# Patient Record
Sex: FEMALE | Race: BLACK OR AFRICAN AMERICAN | NOT HISPANIC OR LATINO | URBAN - METROPOLITAN AREA
[De-identification: names, ages, dates, MRNs, and addresses within clinical notes are randomized per-mention and may not be internally consistent; named-entity substitution may affect disease eponyms.]

---

## 2018-04-23 ENCOUNTER — INPATIENT (INPATIENT)
Facility: HOSPITAL | Age: 63
LOS: 31 days | Discharge: OTHER INSTITUTION | DRG: 4 | End: 2018-05-25
Attending: HOSPITALIST | Admitting: INTERNAL MEDICINE
Payer: COMMERCIAL

## 2018-04-23 VITALS — WEIGHT: 175.71 LBS

## 2018-04-23 DIAGNOSIS — I61.9 NONTRAUMATIC INTRACEREBRAL HEMORRHAGE, UNSPECIFIED: ICD-10-CM

## 2018-04-23 DIAGNOSIS — F10.10 ALCOHOL ABUSE, UNCOMPLICATED: ICD-10-CM

## 2018-04-23 DIAGNOSIS — J96.01 ACUTE RESPIRATORY FAILURE WITH HYPOXIA: ICD-10-CM

## 2018-04-23 DIAGNOSIS — I61.3 NONTRAUMATIC INTRACEREBRAL HEMORRHAGE IN BRAIN STEM: ICD-10-CM

## 2018-04-23 LAB
ALBUMIN SERPL ELPH-MCNC: 4.5 G/DL — SIGNIFICANT CHANGE UP (ref 3.3–5.2)
ALP SERPL-CCNC: 81 U/L — SIGNIFICANT CHANGE UP (ref 40–120)
ALT FLD-CCNC: <5 U/L — SIGNIFICANT CHANGE UP
ANION GAP SERPL CALC-SCNC: 15 MMOL/L — SIGNIFICANT CHANGE UP (ref 5–17)
APPEARANCE UR: CLEAR — SIGNIFICANT CHANGE UP
APTT BLD: 25.2 SEC — LOW (ref 27.5–37.4)
AST SERPL-CCNC: <5 U/L — SIGNIFICANT CHANGE UP
BASOPHILS # BLD AUTO: 0.1 K/UL — SIGNIFICANT CHANGE UP (ref 0–0.2)
BASOPHILS NFR BLD AUTO: 0.6 % — SIGNIFICANT CHANGE UP (ref 0–2)
BILIRUB SERPL-MCNC: 0.6 MG/DL — SIGNIFICANT CHANGE UP (ref 0.4–2)
BILIRUB UR-MCNC: NEGATIVE — SIGNIFICANT CHANGE UP
BUN SERPL-MCNC: 10 MG/DL — SIGNIFICANT CHANGE UP (ref 8–20)
CALCIUM SERPL-MCNC: 9.5 MG/DL — SIGNIFICANT CHANGE UP (ref 8.6–10.2)
CHLORIDE SERPL-SCNC: 99 MMOL/L — SIGNIFICANT CHANGE UP (ref 98–107)
CHOLEST SERPL-MCNC: 229 MG/DL — HIGH (ref 110–199)
CK MB CFR SERPL CALC: 3.1 NG/ML — SIGNIFICANT CHANGE UP (ref 0–6.7)
CK SERPL-CCNC: 177 U/L — HIGH (ref 25–170)
CO2 SERPL-SCNC: 24 MMOL/L — SIGNIFICANT CHANGE UP (ref 22–29)
COLOR SPEC: YELLOW — SIGNIFICANT CHANGE UP
CREAT SERPL-MCNC: 0.57 MG/DL — SIGNIFICANT CHANGE UP (ref 0.5–1.3)
DIFF PNL FLD: ABNORMAL
EOSINOPHIL # BLD AUTO: 0.1 K/UL — SIGNIFICANT CHANGE UP (ref 0–0.5)
EOSINOPHIL NFR BLD AUTO: 1.2 % — SIGNIFICANT CHANGE UP (ref 0–6)
EPI CELLS # UR: SIGNIFICANT CHANGE UP
ETHANOL SERPL-MCNC: <10 MG/DL — SIGNIFICANT CHANGE UP
GLUCOSE SERPL-MCNC: 147 MG/DL — HIGH (ref 70–115)
GLUCOSE UR QL: NEGATIVE MG/DL — SIGNIFICANT CHANGE UP
HCT VFR BLD CALC: 42.8 % — SIGNIFICANT CHANGE UP (ref 37–47)
HDLC SERPL-MCNC: 83 MG/DL — SIGNIFICANT CHANGE UP
HGB BLD-MCNC: 14.2 G/DL — SIGNIFICANT CHANGE UP (ref 12–16)
INR BLD: 0.96 RATIO — SIGNIFICANT CHANGE UP (ref 0.88–1.16)
KETONES UR-MCNC: ABNORMAL
LEUKOCYTE ESTERASE UR-ACNC: NEGATIVE — SIGNIFICANT CHANGE UP
LIPID PNL WITH DIRECT LDL SERPL: 130 MG/DL — SIGNIFICANT CHANGE UP
LYMPHOCYTES # BLD AUTO: 29.3 % — SIGNIFICANT CHANGE UP (ref 20–55)
LYMPHOCYTES # BLD AUTO: 3.2 K/UL — SIGNIFICANT CHANGE UP (ref 1–4.8)
MCHC RBC-ENTMCNC: 32.2 PG — HIGH (ref 27–31)
MCHC RBC-ENTMCNC: 33.2 G/DL — SIGNIFICANT CHANGE UP (ref 32–36)
MCV RBC AUTO: 97.1 FL — SIGNIFICANT CHANGE UP (ref 81–99)
MONOCYTES # BLD AUTO: 0.7 K/UL — SIGNIFICANT CHANGE UP (ref 0–0.8)
MONOCYTES NFR BLD AUTO: 6.1 % — SIGNIFICANT CHANGE UP (ref 3–10)
NEUTROPHILS # BLD AUTO: 6.8 K/UL — SIGNIFICANT CHANGE UP (ref 1.8–8)
NEUTROPHILS NFR BLD AUTO: 62.5 % — SIGNIFICANT CHANGE UP (ref 37–73)
NITRITE UR-MCNC: NEGATIVE — SIGNIFICANT CHANGE UP
PH UR: 7 — SIGNIFICANT CHANGE UP (ref 5–8)
PLATELET # BLD AUTO: 216 K/UL — SIGNIFICANT CHANGE UP (ref 150–400)
POTASSIUM SERPL-MCNC: 6.4 MMOL/L — CRITICAL HIGH (ref 3.5–5.3)
POTASSIUM SERPL-SCNC: 6.4 MMOL/L — CRITICAL HIGH (ref 3.5–5.3)
PROT SERPL-MCNC: 8.9 G/DL — HIGH (ref 6.6–8.7)
PROT UR-MCNC: 30 MG/DL
PROTHROM AB SERPL-ACNC: 10.5 SEC — SIGNIFICANT CHANGE UP (ref 9.8–12.7)
RBC # BLD: 4.41 M/UL — SIGNIFICANT CHANGE UP (ref 4.4–5.2)
RBC # FLD: 13.7 % — SIGNIFICANT CHANGE UP (ref 11–15.6)
RBC CASTS # UR COMP ASSIST: SIGNIFICANT CHANGE UP /HPF (ref 0–4)
SODIUM SERPL-SCNC: 138 MMOL/L — SIGNIFICANT CHANGE UP (ref 135–145)
SP GR SPEC: 1.01 — SIGNIFICANT CHANGE UP (ref 1.01–1.02)
TOTAL CHOLESTEROL/HDL RATIO MEASUREMENT: 3 RATIO — LOW (ref 3.3–7.1)
TRIGL SERPL-MCNC: 79 MG/DL — SIGNIFICANT CHANGE UP (ref 10–200)
TROPONIN T SERPL-MCNC: <0.01 NG/ML — SIGNIFICANT CHANGE UP (ref 0–0.06)
UROBILINOGEN FLD QL: NEGATIVE MG/DL — SIGNIFICANT CHANGE UP
WBC # BLD: 10.8 K/UL — SIGNIFICANT CHANGE UP (ref 4.8–10.8)
WBC # FLD AUTO: 10.8 K/UL — SIGNIFICANT CHANGE UP (ref 4.8–10.8)
WBC UR QL: NEGATIVE — SIGNIFICANT CHANGE UP

## 2018-04-23 PROCEDURE — 99291 CRITICAL CARE FIRST HOUR: CPT

## 2018-04-23 PROCEDURE — 70496 CT ANGIOGRAPHY HEAD: CPT | Mod: 26

## 2018-04-23 PROCEDURE — 99223 1ST HOSP IP/OBS HIGH 75: CPT

## 2018-04-23 PROCEDURE — 93010 ELECTROCARDIOGRAM REPORT: CPT

## 2018-04-23 PROCEDURE — 70450 CT HEAD/BRAIN W/O DYE: CPT | Mod: 26,77

## 2018-04-23 PROCEDURE — 71045 X-RAY EXAM CHEST 1 VIEW: CPT | Mod: 26

## 2018-04-23 PROCEDURE — 70450 CT HEAD/BRAIN W/O DYE: CPT | Mod: 26,59

## 2018-04-23 RX ORDER — NICARDIPINE HYDROCHLORIDE 30 MG/1
5 CAPSULE, EXTENDED RELEASE ORAL
Qty: 40 | Refills: 0 | Status: DISCONTINUED | OUTPATIENT
Start: 2018-04-23 | End: 2018-05-01

## 2018-04-23 RX ORDER — SUCCINYLCHOLINE CHLORIDE 100 MG/5ML
100 SYRINGE (ML) INTRAVENOUS ONCE
Qty: 0 | Refills: 0 | Status: COMPLETED | OUTPATIENT
Start: 2018-04-23 | End: 2018-04-23

## 2018-04-23 RX ORDER — FENTANYL CITRATE 50 UG/ML
100 INJECTION INTRAVENOUS ONCE
Qty: 0 | Refills: 0 | Status: DISCONTINUED | OUTPATIENT
Start: 2018-04-23 | End: 2018-04-23

## 2018-04-23 RX ORDER — PANTOPRAZOLE SODIUM 20 MG/1
40 TABLET, DELAYED RELEASE ORAL DAILY
Qty: 0 | Refills: 0 | Status: DISCONTINUED | OUTPATIENT
Start: 2018-04-23 | End: 2018-04-30

## 2018-04-23 RX ORDER — CHLORHEXIDINE GLUCONATE 213 G/1000ML
15 SOLUTION TOPICAL
Qty: 0 | Refills: 0 | Status: DISCONTINUED | OUTPATIENT
Start: 2018-04-23 | End: 2018-05-25

## 2018-04-23 RX ORDER — ETOMIDATE 2 MG/ML
20 INJECTION INTRAVENOUS ONCE
Qty: 0 | Refills: 0 | Status: COMPLETED | OUTPATIENT
Start: 2018-04-23 | End: 2018-04-23

## 2018-04-23 RX ORDER — ACETAMINOPHEN 500 MG
1000 TABLET ORAL ONCE
Qty: 0 | Refills: 0 | Status: COMPLETED | OUTPATIENT
Start: 2018-04-23 | End: 2018-04-23

## 2018-04-23 RX ORDER — PROPOFOL 10 MG/ML
35 INJECTION, EMULSION INTRAVENOUS
Qty: 1000 | Refills: 0 | Status: DISCONTINUED | OUTPATIENT
Start: 2018-04-23 | End: 2018-04-23

## 2018-04-23 RX ORDER — NICARDIPINE HYDROCHLORIDE 30 MG/1
5 CAPSULE, EXTENDED RELEASE ORAL
Qty: 40 | Refills: 0 | Status: DISCONTINUED | OUTPATIENT
Start: 2018-04-23 | End: 2018-04-23

## 2018-04-23 RX ADMIN — FENTANYL CITRATE 100 MICROGRAM(S): 50 INJECTION INTRAVENOUS at 11:01

## 2018-04-23 RX ADMIN — NICARDIPINE HYDROCHLORIDE 25 MG/HR: 30 CAPSULE, EXTENDED RELEASE ORAL at 10:30

## 2018-04-23 RX ADMIN — FENTANYL CITRATE 100 MICROGRAM(S): 50 INJECTION INTRAVENOUS at 10:35

## 2018-04-23 RX ADMIN — FENTANYL CITRATE 100 MICROGRAM(S): 50 INJECTION INTRAVENOUS at 10:56

## 2018-04-23 RX ADMIN — NICARDIPINE HYDROCHLORIDE 25 MG/HR: 30 CAPSULE, EXTENDED RELEASE ORAL at 12:24

## 2018-04-23 RX ADMIN — PROPOFOL 16.74 MICROGRAM(S)/KG/MIN: 10 INJECTION, EMULSION INTRAVENOUS at 10:40

## 2018-04-23 RX ADMIN — NICARDIPINE HYDROCHLORIDE 25 MG/HR: 30 CAPSULE, EXTENDED RELEASE ORAL at 18:48

## 2018-04-23 RX ADMIN — ETOMIDATE 20 MILLIGRAM(S): 2 INJECTION INTRAVENOUS at 09:38

## 2018-04-23 RX ADMIN — FENTANYL CITRATE 100 MICROGRAM(S): 50 INJECTION INTRAVENOUS at 09:53

## 2018-04-23 RX ADMIN — CHLORHEXIDINE GLUCONATE 15 MILLILITER(S): 213 SOLUTION TOPICAL at 18:48

## 2018-04-23 RX ADMIN — PANTOPRAZOLE SODIUM 40 MILLIGRAM(S): 20 TABLET, DELAYED RELEASE ORAL at 12:23

## 2018-04-23 RX ADMIN — PROPOFOL 16.74 MICROGRAM(S)/KG/MIN: 10 INJECTION, EMULSION INTRAVENOUS at 12:24

## 2018-04-23 RX ADMIN — Medication 100 MILLIGRAM(S): at 09:38

## 2018-04-23 RX ADMIN — Medication 400 MILLIGRAM(S): at 20:45

## 2018-04-23 NOTE — ED ADULT NURSE REASSESSMENT NOTE - NS ED NURSE REASSESS COMMENT FT1
100mcg fentanyl given 0953.
chase  started at 0946 @ 10mg
patient @ CT. pt. brought back to room for immediate intubation by MD. Buckley. 0938 etomitate and 100 of succ given. 23 @ the lip tube size 7.5
propofol started @ 25 @ 9829.
pt. transported for CtA @ 4290
100mcg given at 1035am by SALBADOR Moscoso
ICU @ BEDSIDE TO EVALUATE PT.
cardene turned off @ 0626.
chase koenig started @ 1031 3mg/ propofol @ 50 at 1032.
neurology @ bedside to evaluate patient and to talk to family.
refer to stroke flowsheet and chart and provider notes, Neurosurgical PA and ICU at bedside, pending transfer to ICU for hemorrhagic stroke, pt on cardene and propofol gtt

## 2018-04-23 NOTE — PROGRESS NOTE ADULT - ASSESSMENT
63yf presented to the ED with new onset of dizziness and unresponsiveness.  Ct of the brain post for pontine hemorrhage.  Plan  1. exam and films reviewed with Dr Cullen-at this time the patient will need medical management.   2. Blood pressure control -Pt is on Cardene.  3. Pt will be admitted to the MICU  4. Family informed that the specific time of hemorrhage is non surgical and medical management needed.  informed that this type of bleed is devastating and prognosis is poor.

## 2018-04-23 NOTE — H&P ADULT - ASSESSMENT
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

## 2018-04-23 NOTE — H&P ADULT - HISTORY OF PRESENT ILLNESS
62F with a history of alcoholism, who presented with dizziness and then collapsed at home. Upon arrival in the ER she was obtunded and intubated for airway support. He BP was noted to be elevated. 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 They was no evidence of aneurysm or dissection noted on CTA. Her BP wascontrolled on IV Cardene. Neurologically, she was unresponsive, her pupils were small and nonreactive, there was a gag reflex, and she withdrew her legs and left hand to pain.    Neurosurgery was consulted and confirmed there was no surgical or non-invasive intervention which could be offered.

## 2018-04-23 NOTE — H&P ADULT - ATTENDING COMMENTS
I have seen and evaluated the patient agree with above with the following additions:    ICH pontine, poor neuro exam, poor exam no surgical intervention  BP control, palliative care evaluation will see if any improvement over the next 24-48 hours family updated at bedside    CCT 35 min

## 2018-04-23 NOTE — ED PROVIDER NOTE - OBJECTIVE STATEMENT
64 yo female no known PMHx but +hx of etoh abuse; BIB EMS with , patient awoke this morning normally, no complaints; was getting ready for an event this morning, when suddenly at approx 8:30-8:45 she called out to her  who found her slumped over in chair, mumbling that she "didn't feel well, " then progressed to unable to speak. EMS noted  systolic in field, FS was normal; patient unable to speak on arrival, hx otherwise limited.

## 2018-04-23 NOTE — ED ADULT NURSE NOTE - OBJECTIVE STATEMENT
63 year old female comes to ED. CODE STROKE called. last known well at 0830.pt. has no known history besides hx of alcohol abuse. 63 year old female comes to ED brought in by EMS. CODE STROKE called. last known well at 0830.  pt. code team called to bedside. pt. not able to talk or follow commands. pt. has no known history besides hx of alcohol abuse. patient brought to CT. patient having snoring respirations, unable to protect her airway.

## 2018-04-23 NOTE — H&P ADULT - PROBLEM SELECTOR PLAN 1
Likely related to uncontrolled HTN  WIll continue BP control with Cardene  Q1H Neuro checks  Minimize sedatives as much as possible to comlpete true neuro exam.  Repeat CT scan in 8 hours  F/U with Neuro-surgery  Will need to address goals of care with the family. Initially discussed with the patient's , who is not ready at this point to make a definitive decisions. Will need Palliative care evaluation to assist with goals of care discussions.

## 2018-04-23 NOTE — ED ADULT TRIAGE NOTE - CHIEF COMPLAINT QUOTE
Patient BIBA, last known well this morning at 8:30, patient presents with right sided facial droop, right sided weakness and slurred speech,  Dr Buckley at bedside at 9:24 and code stroke called.  Son with patient for further information

## 2018-04-23 NOTE — ED PROVIDER NOTE - MEDICAL DECISION MAKING DETAILS
shortly after non con CT, became more sonorous, snoring respirations, no meaningful response; intubated for airway protection; neurosrugery and micu notified; will admit

## 2018-04-23 NOTE — H&P ADULT - PROBLEM SELECTOR PLAN 3
Related to ICH  Continue with Vent support.   Wean FIO2 for SAO2 > 92%  6cc/Kg TV for vent protective strategy  F/U ABG  VAP bundle  Minimize sedation as much as possible to help with true neuro exam

## 2018-04-23 NOTE — ED PROVIDER NOTE - GASTROINTESTINAL, MLM
Abdomen soft, non-tender, no guarding. Abdomen soft, non-tender, no guarding. +palpable 20 week fibroid uterus

## 2018-04-23 NOTE — PROGRESS NOTE ADULT - SUBJECTIVE AND OBJECTIVE BOX
HPI:    PAST MEDICAL & SURGICAL HISTORY:      REVIEW OF SYSTEMS:    CONSTITUTIONAL: No fever, weight loss, or fatigue  EYES: No eye pain, visual disturbances, or discharge  ENMT:  No difficulty hearing, tinnitus, vertigo; No sinus or throat pain  NECK: No pain or stiffness  BREASTS: No pain, masses, or nipple discharge  RESPIRATORY: No cough, wheezing, chills or hemoptysis; No shortness of breath  CARDIOVASCULAR: No chest pain, palpitations, dizziness, or leg swelling  GASTROINTESTINAL: No abdominal or epigastric pain. No nausea, vomiting, or hematemesis; No diarrhea or constipation. No melena or hematochezia.  GENITOURINARY: No dysuria, frequency, hematuria, or incontinence  NEUROLOGICAL: No headaches, memory loss, loss of strength, numbness, or tremors  SKIN: No itching, burning, rashes, or lesions   LYMPH NODES: No enlarged glands  ENDOCRINE: No heat or cold intolerance; No hair loss  MUSCULOSKELETAL: No joint pain or swelling; No muscle, back, or extremity pain  PSYCHIATRIC: No depression, anxiety, mood swings, or difficulty sleeping  HEME/LYMPH: No easy bruising, or bleeding gums  ALLERY AND IMMUNOLOGIC: No hives or eczema    Allergies    No Known Allergies    Intolerances      MEDICATIONS  (STANDING):    MEDICATIONS  (PRN):    SOCIAL HISTORY:  FAMILY HISTORY:    Vital Signs Last 24 Hrs  T(C): --  T(F): --  HR: 65 (23 Apr 2018 09:43) (65 - 65)  BP: 260/140 (23 Apr 2018 09:44) (260/140 - 260/140)  BP(mean): --  RR: 28 (23 Apr 2018 09:43) (28 - 28)  SpO2: 100% (23 Apr 2018 09:43) (100% - 100%)    PHYSICAL EXAM:    GENERAL: NAD, well-groomed, well-developed  HEAD:  Atraumatic, Normocephalic  EYES: EOMI, PERRLA, conjunctiva and sclera clear  ENMT: No tonsillar erythema, exudates, or enlargement; Moist mucous membranes, Good dentition, No lesions  NECK: Supple, No JVD, Normal thyroid  NERVOUS SYSTEM:  Alert & Oriented X3, Good concentration; Motor Strength 5/5 B/L upper and lower extremities; DTRs 2+ intact and symmetric  CHEST/LUNG: Clear to percussion bilaterally; No rales, rhonchi, wheezing, or rubs  HEART: Regular rate and rhythm; No murmurs, rubs, or gallops  ABDOMEN: Soft, Nontender, Nondistended; Bowel sounds present  EXTREMITIES:  2+ Peripheral Pulses, No clubbing, cyanosis, or edema  LYMPH: No lymphadenopathy noted  SKIN: No rashes or lesions    LABS:                RADIOLOGY & ADDITIONAL STUDIES:  ~~~~~~~~~~~~~~~~~~~~~~~~~~~~~~ HPI:  63yf prepparing to attend a  when suddenly she felt dizzy and said "I think am having a stroke." Pt does not have any prior stroke history.  Pt does not have any medical history.  Upon arrival to the ED she was unresponsive. Pt was intubated in the ED.    PAST MEDICAL & SURGICAL HISTORY:  No Medical History    REVIEW OF SYSTEMS:  Pt is unresponsive, intubated. ROS unable to be reviewed    Allergies    No Known Allergies    Intolerances      MEDICATIONS  (STANDING):    MEDICATIONS  (PRN):    SOCIAL HISTORY:  FAMILY HISTORY:    Vital Signs Last 24 Hrs  T(C): --  T(F): --  HR: 65 (2018 09:43) (65 - 65)  BP: 260/140 (2018 09:44) (260/140 - 260/140)  BP(mean): --  RR: 28 (2018 09:43) (28 - 28)  SpO2: 100% (2018 09:43) (100% - 100%)    PHYSICAL EXAM:    GENERAL: NAD, well-groomed, well-developed  HEAD:  Atraumatic, Normocephalic  EYES: . PInpoint bilat, no corneals bilat,  intermittent downward gaze.  ENMT: intubated, pos gag. no definite facial noted. Pos resp  NECK: Supple, No JVD,   NERVOUS SYSTEM: Unresp,minimum withdrawal with noxious of the lower extrem, grasp with the left hand spont,   CHEST/LUNG: Clear to few coarse bs bilaterally; No rales, rhonchi, wheezing, or rubs  HEART: Regular rate and rhythm; No murmurs, rubs, or gallops  ABDOMEN: Soft, Nontender, Nondistended; Bowel sounds present  EXTREMITIES:  2+ Peripheral Pulses, No clubbing, cyanosis, or edema    LABS:                RADIOLOGY & ADDITIONAL STUDIES:  ~~~~~~~~~~~~~~~~~~~~~~~~~~~~~~  < from: CT Angio Head w/ IV Cont (18 @ 10:08) >   EXAM:  CT ANGIO BRAIN (W)AW IC                        PROCEDURE DATE:  2018     EXAM:  CT ANGIO BRAIN (W)AW IC                        PROCEDURE DATE:  2018    < end of copied text >  < from: CT Angio Head w/ IV Cont (18 @ 10:08) >  IMPRESSION: Acute pontine hemorrhage, large in size with new dissection   into the fourth ventricle.  No hydrocephalus. Unremarkable CTA of the   brain.  < end of copied text >    < from: CT Head No Cont (18 @ 09:33) >   EXAM:  CT BRAIN                          PROCEDURE DATE:  2018    < IMPRESSION:      2.2 x 1.0 x 2.2 cm pontine hemorrhage. Findings   discussed with Dr. Buckley at 9:32 AM.  discussed with Dr. Bucklye at 9:32 AM.  < end of copied text > HPI:  63yf prepparing to attend a  when suddenly she felt dizzy and said "I think am having a stroke." Pt does not have any prior stroke history.  Pt does not have any medical history.  Upon arrival to the ED she was unresponsive. Pt was intubated in the ED.    PAST MEDICAL & SURGICAL HISTORY:  No Medical History    REVIEW OF SYSTEMS:  Pt is unresponsive, intubated. ROS unable to be reviewed    Allergies    No Known Allergies    Intolerances      MEDICATIONS  (STANDING):    MEDICATIONS  (PRN):    SOCIAL HISTORY:  FAMILY HISTORY:    Vital Signs Last 24 Hrs  T(C): --  T(F): --  HR: 65 (2018 09:43) (65 - 65)  BP: 260/140 (2018 09:44) (260/140 - 260/140)  BP(mean): --  RR: 28 (2018 09:43) (28 - 28)  SpO2: 100% (2018 09:43) (100% - 100%)    PHYSICAL EXAM:    GENERAL: NAD, well-groomed, well-developed  HEAD:  Atraumatic, Normocephalic  EYES: . PInpoint bilat, no corneals bilat,  intermittent downward gaze.  ENMT: intubated, pos gag. no definite facial noted. Pos resp  NECK: Supple, No JVD,   NERVOUS SYSTEM: Unresp,minimum withdrawal with noxious of the lower extrem, grasp with the left hand spont,   CHEST/LUNG: Clear to few coarse bs bilaterally; No rales, rhonchi, wheezing, or rubs  HEART: Regular rate and rhythm; No murmurs, rubs, or gallops  ABDOMEN: Soft, Nontender, Nondistended; Bowel sounds present  EXTREMITIES:  2+ Peripheral Pulses, No clubbing, cyanosis, or edema    LABS:                  14.2   10.8  )-----------( 216      ( 2018 09:50 )             42.8   04-    138  |  99  |  10.0  ----------------------------<  147<H>  6.4<HH>   |  24.0  |  0.57    Ca    9.5      2018 09:50    TPro  8.9<H>  /  Alb  4.5  /  TBili  0.6  /  DBili  x   /  AST  <5  /  ALT  <5  /  AlkPhos  81      PT/INR - ( 2018 09:50 )   PT: 10.5 sec;   INR: 0.96 ratio         PTT - ( 2018 09:50 )  PTT:25.2 sec      RADIOLOGY & ADDITIONAL STUDIES:  ~~~~~~~~~~~~~~~~~~~~~~~~~~~~~~  < from: CT Angio Head w/ IV Cont (18 @ 10:08) >   EXAM:  CT ANGIO BRAIN (W)AW IC                        PROCEDURE DATE:  2018     EXAM:  CT ANGIO BRAIN (W)AW IC                        PROCEDURE DATE:  2018    < end of copied text >  < from: CT Angio Head w/ IV Cont (18 @ 10:08) >  IMPRESSION: Acute pontine hemorrhage, large in size with new dissection   into the fourth ventricle.  No hydrocephalus. Unremarkable CTA of the   brain.  < end of copied text >    < from: CT Head No Cont (18 @ 09:33) >   EXAM:  CT BRAIN                          PROCEDURE DATE:  2018    < IMPRESSION:      2.2 x 1.0 x 2.2 cm pontine hemorrhage. Findings   discussed with Dr. Buckley at 9:32 AM.  discussed with Dr. Buckley at 9:32 AM.  < end of copied text >

## 2018-04-23 NOTE — ED PROVIDER NOTE - CARE PLAN
Principal Discharge DX:	ICH (intracerebral hemorrhage)  Secondary Diagnosis:	Acute respiratory failure

## 2018-04-23 NOTE — ED PROVIDER NOTE - CRITICAL CARE PROVIDED
additional history taking/direct patient care (not related to procedure)/interpretation of diagnostic studies/documentation/consultation with other physicians

## 2018-04-24 DIAGNOSIS — R41.82 ALTERED MENTAL STATUS, UNSPECIFIED: ICD-10-CM

## 2018-04-24 DIAGNOSIS — J96.00 ACUTE RESPIRATORY FAILURE, UNSPECIFIED WHETHER WITH HYPOXIA OR HYPERCAPNIA: ICD-10-CM

## 2018-04-24 DIAGNOSIS — I16.1 HYPERTENSIVE EMERGENCY: ICD-10-CM

## 2018-04-24 DIAGNOSIS — Z51.5 ENCOUNTER FOR PALLIATIVE CARE: ICD-10-CM

## 2018-04-24 LAB
ANION GAP SERPL CALC-SCNC: 15 MMOL/L — SIGNIFICANT CHANGE UP (ref 5–17)
BUN SERPL-MCNC: 9 MG/DL — SIGNIFICANT CHANGE UP (ref 8–20)
CALCIUM SERPL-MCNC: 9.3 MG/DL — SIGNIFICANT CHANGE UP (ref 8.6–10.2)
CHLORIDE SERPL-SCNC: 103 MMOL/L — SIGNIFICANT CHANGE UP (ref 98–107)
CO2 SERPL-SCNC: 25 MMOL/L — SIGNIFICANT CHANGE UP (ref 22–29)
CREAT SERPL-MCNC: 0.6 MG/DL — SIGNIFICANT CHANGE UP (ref 0.5–1.3)
GLUCOSE SERPL-MCNC: 119 MG/DL — HIGH (ref 70–115)
HCT VFR BLD CALC: 40.5 % — SIGNIFICANT CHANGE UP (ref 37–47)
HGB BLD-MCNC: 13.1 G/DL — SIGNIFICANT CHANGE UP (ref 12–16)
MAGNESIUM SERPL-MCNC: 2.2 MG/DL — SIGNIFICANT CHANGE UP (ref 1.6–2.6)
MCHC RBC-ENTMCNC: 31.7 PG — HIGH (ref 27–31)
MCHC RBC-ENTMCNC: 32.3 G/DL — SIGNIFICANT CHANGE UP (ref 32–36)
MCV RBC AUTO: 98.1 FL — SIGNIFICANT CHANGE UP (ref 81–99)
PHOSPHATE SERPL-MCNC: 3 MG/DL — SIGNIFICANT CHANGE UP (ref 2.4–4.7)
PLATELET # BLD AUTO: 195 K/UL — SIGNIFICANT CHANGE UP (ref 150–400)
POTASSIUM SERPL-MCNC: 3.6 MMOL/L — SIGNIFICANT CHANGE UP (ref 3.5–5.3)
POTASSIUM SERPL-SCNC: 3.6 MMOL/L — SIGNIFICANT CHANGE UP (ref 3.5–5.3)
PROCALCITONIN SERPL-MCNC: <0.05 NG/ML — SIGNIFICANT CHANGE UP (ref 0–0.04)
RBC # BLD: 4.13 M/UL — LOW (ref 4.4–5.2)
RBC # FLD: 13.9 % — SIGNIFICANT CHANGE UP (ref 11–15.6)
SODIUM SERPL-SCNC: 143 MMOL/L — SIGNIFICANT CHANGE UP (ref 135–145)
WBC # BLD: 11.3 K/UL — HIGH (ref 4.8–10.8)
WBC # FLD AUTO: 11.3 K/UL — HIGH (ref 4.8–10.8)

## 2018-04-24 PROCEDURE — 99291 CRITICAL CARE FIRST HOUR: CPT

## 2018-04-24 PROCEDURE — 99233 SBSQ HOSP IP/OBS HIGH 50: CPT

## 2018-04-24 PROCEDURE — 99223 1ST HOSP IP/OBS HIGH 75: CPT

## 2018-04-24 PROCEDURE — 71045 X-RAY EXAM CHEST 1 VIEW: CPT | Mod: 26

## 2018-04-24 RX ORDER — THIAMINE MONONITRATE (VIT B1) 100 MG
100 TABLET ORAL DAILY
Qty: 0 | Refills: 0 | Status: DISCONTINUED | OUTPATIENT
Start: 2018-04-24 | End: 2018-05-25

## 2018-04-24 RX ORDER — ACETAMINOPHEN 500 MG
1000 TABLET ORAL ONCE
Qty: 0 | Refills: 0 | Status: COMPLETED | OUTPATIENT
Start: 2018-04-24 | End: 2018-04-24

## 2018-04-24 RX ORDER — FOLIC ACID 0.8 MG
1 TABLET ORAL DAILY
Qty: 0 | Refills: 0 | Status: DISCONTINUED | OUTPATIENT
Start: 2018-04-24 | End: 2018-05-25

## 2018-04-24 RX ADMIN — NICARDIPINE HYDROCHLORIDE 25 MG/HR: 30 CAPSULE, EXTENDED RELEASE ORAL at 05:42

## 2018-04-24 RX ADMIN — NICARDIPINE HYDROCHLORIDE 25 MG/HR: 30 CAPSULE, EXTENDED RELEASE ORAL at 21:06

## 2018-04-24 RX ADMIN — PANTOPRAZOLE SODIUM 40 MILLIGRAM(S): 20 TABLET, DELAYED RELEASE ORAL at 13:20

## 2018-04-24 RX ADMIN — CHLORHEXIDINE GLUCONATE 15 MILLILITER(S): 213 SOLUTION TOPICAL at 05:42

## 2018-04-24 RX ADMIN — NICARDIPINE HYDROCHLORIDE 25 MG/HR: 30 CAPSULE, EXTENDED RELEASE ORAL at 00:50

## 2018-04-24 RX ADMIN — Medication 400 MILLIGRAM(S): at 23:54

## 2018-04-24 RX ADMIN — NICARDIPINE HYDROCHLORIDE 25 MG/HR: 30 CAPSULE, EXTENDED RELEASE ORAL at 13:20

## 2018-04-24 RX ADMIN — CHLORHEXIDINE GLUCONATE 15 MILLILITER(S): 213 SOLUTION TOPICAL at 18:01

## 2018-04-24 NOTE — PROGRESS NOTE ADULT - SUBJECTIVE AND OBJECTIVE BOX
Patient is a 63y old  Female who presents with a chief complaint of complained of dizzyness and collapsed at home (2018 10:59)      BRIEF HOSPITAL COURSE: 63 yof with alcohol use now with pontine hemorrhagic stroke    Events last 24 hours: neuro exam better this am    PAST MEDICAL & SURGICAL HISTORY:  Alcohol abuse  No significant past surgical history      Review of Systems:  limited due to MS    Medications:    niCARdipine Infusion 5 mG/Hr IV Continuous <Continuous>  pantoprazole  Injectable 40 milliGRAM(s) IV Push daily  chlorhexidine 0.12% Liquid 15 milliLiter(s) Swish and Spit two times a day    Mode: AC/ CMV (Assist Control/ Continuous Mandatory Ventilation)  RR (machine): 18  TV (machine): 400  FiO2: 25  PEEP: 5  MAP: 8  PIP: 19      ICU Vital Signs Last 24 Hrs  T(C): 38.1 (2018 21:00), Max: 38.7 (2018 00:00)  T(F): 100.6 (2018 21:00), Max: 101.7 (2018 00:00)  HR: 92 (2018 21:00) (79 - 119)  BP: 136/74 (2018 21:00) (119/70 - 170/85)  BP(mean): 99 (2018 21:00) (86 - 116)  ABP: --  ABP(mean): --  RR: 20 (2018 21:00) (18 - 29)  SpO2: 100% (2018 21:00) (100% - 100%)      ABG - ( 2018 10:19 )  pH, Arterial: 7.49  pH, Blood: x     /  pCO2: 33    /  pO2: 447   / HCO3: 26    / Base Excess: 2.1   /  SaO2: x                   I&O's Detail    2018 07:01  -  2018 07:00  --------------------------------------------------------  IN:    niCARdipine Infusion: 350 mL    Solution: 100 mL  Total IN: 450 mL    OUT:    Indwelling Catheter - Urethral: 970 mL  Total OUT: 970 mL    Total NET: -520 mL      2018 07:01  -  2018 21:54  --------------------------------------------------------  IN:    Jevity: 360 mL    niCARdipine Infusion: 130 mL    Oral Fluid: 300 mL  Total IN: 790 mL    OUT:    Indwelling Catheter - Urethral: 325 mL  Total OUT: 325 mL    Total NET: 465 mL            LABS:                        13.1   11.3  )-----------( 195      ( 2018 05:38 )             40.5     04-24    143  |  103  |  9.0  ----------------------------<  119<H>  3.6   |  25.0  |  0.60    Ca    9.3      2018 05:38  Phos  3.0     -  Mg     2.2     -24    TPro  8.9<H>  /  Alb  4.5  /  TBili  0.6  /  DBili  x   /  AST  <5  /  ALT  <5  /  AlkPhos  81  04-23      CARDIAC MARKERS ( 2018 09:50 )  x     / <0.01 ng/mL / 177 U/L / x     / 3.1 ng/mL      CAPILLARY BLOOD GLUCOSE        PT/INR - ( 2018 09:50 )   PT: 10.5 sec;   INR: 0.96 ratio         PTT - ( 2018 09:50 )  PTT:25.2 sec  Urinalysis Basic - ( 2018 21:24 )    Color: Yellow / Appearance: Clear / S.010 / pH: x  Gluc: x / Ketone: Moderate  / Bili: Negative / Urobili: Negative mg/dL   Blood: x / Protein: 30 mg/dL / Nitrite: Negative   Leuk Esterase: Negative / RBC: 0-2 /HPF / WBC Negative   Sq Epi: x / Non Sq Epi: Occasional / Bacteria: x      CULTURES:      Physical Examination:    General: No acute distress.  intubated off sedation    HEENT: Pupils equal, reactive to light.  Symmetric.    PULM: Clear to auscultation bilaterally, no significant sputum production    CVS: Regular rate and rhythm, no murmurs, rubs, or gallops    ABD: Soft, nondistended, nontender, normoactive bowel sounds, no masses    EXT: No edema, nontender    SKIN: Warm and well perfused, no rashes noted.    NEURO: tracks with eyes, will squeeze hands, nods yes and no        CRITICAL CARE TIME SPENT: 35 min

## 2018-04-24 NOTE — PROGRESS NOTE ADULT - PROBLEM SELECTOR PLAN 2
Related to ICH and AMS. Continue mechanical ventilation with full support, titrating to maintain SaO2 > 92%. Propofol weaned off. Mental status precludes SBT's.

## 2018-04-24 NOTE — PROGRESS NOTE ADULT - SUBJECTIVE AND OBJECTIVE BOX
Patient is a 63y old  Female who presents with a chief complaint of complained of dizzyness and collapsed at home (2018 10:59)      BRIEF HOSPITAL COURSE: 64 y/o F with a h/o EtOH abuse, admitted on  with dizziness and syncope. Upon arrival in the ED she was obtunded and intubated for airway support. Her BP was noted to be elevated. 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 There was no evidence of aneurysm or dissection noted on CTA. IV Cardene initiated for BP control. Poor neurological exam. Neurosurgery not recommending surgical intervention at this point.    Events last 24 hours: Patient remains intubated on full vent support. Cardene infusion continues for BP management. Neuro exam remains poor- unresponsive to verbal stimuli, however, will open eyes but without purpose- not tracking. B/l UE not responsive to noxious stimuli, b/l LE do withdraw to pain. Overbreathing ventilator. Had period of agitation overnight which was self resolving. Developed persistent fever (101.7).    PAST MEDICAL & SURGICAL HISTORY:  Alcohol abuse  No significant past surgical history      Review of Systems: Unable to obtain as patient is intubated.        Medications:    niCARdipine Infusion 5 mG/Hr IV Continuous <Continuous>  pantoprazole  Injectable 40 milliGRAM(s) IV Push daily  chlorhexidine 0.12% Liquid 15 milliLiter(s) Swish and Spit two times a day        Mode: AC/ CMV (Assist Control/ Continuous Mandatory Ventilation)  RR (machine): 18  TV (machine): 400  FiO2: 40  PEEP: 5  MAP: 8  PIP: 18      ICU Vital Signs Last 24 Hrs  T(C): 38.7 (2018 01:00), Max: 38.7 (2018 00:00)  T(F): 101.7 (2018 01:00), Max: 101.7 (2018 00:00)  HR: 91 (2018 01:30) (65 - 112)  BP: 129/63 (2018 01:30) (110/63 - 260/140)  BP(mean): 89 (2018 01:30) (83 - 152)  ABP: --  ABP(mean): --  RR: 20 (2018 01:30) (18 - 33)  SpO2: 100% (2018 01:30) (98% - 100%)      ABG - ( 2018 10:19 )  pH: 7.49  /  pCO2: 33    /  pO2: 447   / HCO3: 26    / Base Excess: 2.1   /  SaO2: x                   I&O's Detail    2018 07:01  -  2018 01:43  --------------------------------------------------------  IN:    niCARdipine Infusion: 200 mL    Solution: 100 mL  Total IN: 300 mL    OUT:    Indwelling Catheter - Urethral: 770 mL  Total OUT: 770 mL    Total NET: -470 mL            LABS:                        14.2   10.8  )-----------( 216      ( 2018 09:50 )             42.8     04-    138  |  99  |  10.0  ----------------------------<  147<H>  6.4<HH>   |  24.0  |  0.57    Ca    9.5      2018 09:50    TPro  8.9<H>  /  Alb  4.5  /  TBili  0.6  /  DBili  x   /  AST  <5  /  ALT  <5  /  AlkPhos  81  0423      CARDIAC MARKERS ( 2018 09:50 )  x     / <0.01 ng/mL / 177 U/L / x     / 3.1 ng/mL      CAPILLARY BLOOD GLUCOSE        PT/INR - ( 2018 09:50 )   PT: 10.5 sec;   INR: 0.96 ratio         PTT - ( 2018 09:50 )  PTT:25.2 sec  Urinalysis Basic - ( 2018 21:24 )    Color: Yellow / Appearance: Clear / S.010 / pH: x  Gluc: x / Ketone: Moderate  / Bili: Negative / Urobili: Negative mg/dL   Blood: x / Protein: 30 mg/dL / Nitrite: Negative   Leuk Esterase: Negative / RBC: 0-2 /HPF / WBC Negative   Sq Epi: x / Non Sq Epi: Occasional / Bacteria: x      CULTURES:      Physical Examination:    General: No acute distress.  intubated, periodically will open eyes    HEENT: Pupils pin point, not reactive  Symmetric.    PULM: Clear to auscultation bilaterally, no significant sputum production    CVS: Regular rate and rhythm, no murmurs, rubs, or gallops    ABD: Soft, nondistended, nontender, normoactive bowel sounds, no masses    EXT: No edema, nontender    SKIN: Warm and well perfused, no rashes noted.    NEURO: A&Ox0, unresponsive to verbal stimuli, opens eyes but without purpose- does not track, b/l UE not responsive to noxious stimuli, b/l LE withdraw to pain, overbreathes vent, (+)gag/cough, pupils nonreactive      RADIOLOGY:     < from: CT Head No Cont (18 @ 18:29) >  FINDINGS:  Persistent acute hemorrhage of the brainstem immanuel a again seen now   measuring 2.5 cm AP diameter, 1.8 cm in height 2.5 cm in transverse   diameter previously measuring 2.2 x 1.2 x 2.2 cm. There is compression of   the adjacent fourth ventricle without intraventricular hemorrhage. The   ambient cisterns, suprasellar cisterns, subarachnoid space and   extra-axial spaces appear normal. No hydrocephalus seen.  The ventricles and sulci are normal for the patient's age.   The posterior fossa structures and basal cisterns appear normal.    There is no midline shift.   There are scattered white matter hypodensities consistent with small   vessel disease.    Chronic right toe focal basal ganglia lacunar infarct noted..    The visualized portions of the optic globes and paranasal sinuses are   normal.    There are no skull fractures.    IMPRESSION:    Persistent immanuel brainstem intraparenchymal hematoma as described.      < from: CT Angio Head w/ IV Cont (18 @ 10:08) >  FINDINGS:    Pontine hemorrhage is slightly larger in size now measuring 2.5 x 2.3 cm   with dissection into the fourth ventricle. Trace bilateral cavernous   carotid artery calcifications.    The [place] and vertebrobasilar system shows no evidence of   significant stenosis, occlusion or saccular aneurysm dilation. No   evidence for arterial venous malformation. The vertebral arteries are   codominant.    The patient is intubated.    IMPRESSION: Acute pontine hemorrhage, large in size with new dissection   into the fourth ventricle.  No hydrocephalus. Unremarkable CTA of the   brain.        CRITICAL CARE TIME SPENT: 44 mins  Evaluating/treating patient, reviewing data/labs/imaging, discussing case with multidisciplinary team, discussing plan/goals of care with patient/family. Non-inclusive of procedure time.

## 2018-04-24 NOTE — PROGRESS NOTE ADULT - ATTENDING COMMENTS
NSGY Attg:    see above    patient seen and examined    Exam:  eyes open spontaneously  no tracking  extends LUE to noxious  not following commands    CT/CTA reviewed -- no vascular malformation or hydrocephalus    A/P:  pontine ICH with exam as above  cont supportive care per MICU  BP control  diagnosis and likely poor prognosis for meaningful functional recovery explained to the patient's family at the bedside

## 2018-04-24 NOTE — CONSULT NOTE ADULT - SUBJECTIVE AND OBJECTIVE BOX
HPI: 63F with PMH as listed admitted  with dizziness and collapse. 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     PERTINENT PMH REVIEWED: Yes     PAST MEDICAL & SURGICAL HISTORY:  Alcohol abuse  No significant past surgical history    SOCIAL HISTORY:  non smoker, + EtOH                                    Admitted from:  home    Surrogate - Capo Walker - spouse     FAMILY HISTORY:  No pertinent family history in first degree relatives    Baseline ADLs (prior to admission):  independent      Allergies    No Known Allergies    Intolerances    Present Symptoms:     Dyspnea:   Nausea/Vomiting: Yes No  Anxiety:  Yes No  Depression: Yes No  Fatigue: Yes No  Loss of appetite: Yes No    Pain:             Character-            Duration-            Effect-            Factors-            Frequency-            Location-            Severity-    Review of Systems: Reviewed                     Negative:                     Positive:  Unable to obtain due to poor mentation   All others negative    MEDICATIONS  (STANDING):  chlorhexidine 0.12% Liquid 15 milliLiter(s) Swish and Spit two times a day  niCARdipine Infusion 5 mG/Hr (25 mL/Hr) IV Continuous <Continuous>  pantoprazole  Injectable 40 milliGRAM(s) IV Push daily    PHYSICAL EXAM:    Vital Signs Last 24 Hrs  T(C): 38.2 (2018 12:00), Max: 38.7 (2018 00:00)  T(F): 100.8 (2018 12:00), Max: 101.7 (2018 00:00)  HR: 97 (2018 13:05) (69 - 119)  BP: 152/85 (2018 13:00) (119/70 - 170/85)  BP(mean): 113 (2018 13:00) (86 - 116)  RR: 21 (2018 13:00) (18 - 29)  SpO2: 100% (2018 13:05) (100% - 100%)    General: alert  oriented x ____ lethargic agitated                  cachexia  nonverbal  coma    Karnofsky:  %    HEENT: normal  dry mouth  ET tube/trach    Lungs: comfortable tachypnea/labored breathing  excessive secretions    CV: normal  tachycardia    GI: normal  distended  tender  no BS               PEG/NG/OG tube  constipation  last BM:     : normal  incontinent  oliguria/anuria  bush    MSK: normal  weakness  edema             ambulatory  bedbound/wheelchair bound    Skin: normal  pressure ulcers- Stage_____  no rash    LABS:                      13.1   11.3  )-----------( 195      ( 2018 05:38 )             40.5     -    143  |  103  |  9.0  ----------------------------<  119<H>  3.6   |  25.0  |  0.60    Ca    9.3      2018 05:38  Phos  3.0     -  Mg     2.2         TPro  8.9<H>  /  Alb  4.5  /  TBili  0.6  /  DBili  x   /  AST  <5  /  ALT  <5  /  AlkPhos  81      PT/INR - ( 2018 09:50 )   PT: 10.5 sec;   INR: 0.96 ratio       PTT - ( 2018 09:50 )  PTT:25.2 sec  Urinalysis Basic - ( 2018 21:24 )    Color: Yellow / Appearance: Clear / S.010 / pH: x  Gluc: x / Ketone: Moderate  / Bili: Negative / Urobili: Negative mg/dL   Blood: x / Protein: 30 mg/dL / Nitrite: Negative   Leuk Esterase: Negative / RBC: 0-2 /HPF / WBC Negative   Sq Epi: x / Non Sq Epi: Occasional / Bacteria: x    I&O's Summary    2018 07:  -  2018 07:00  --------------------------------------------------------  IN: 450 mL / OUT: 970 mL / NET: -520 mL    2018 07:  -  2018 14:57  --------------------------------------------------------  IN: 100 mL / OUT: 125 mL / NET: -25 mL    RADIOLOGY & ADDITIONAL STUDIES:    < from: CT Head No Cont (18 @ 18:29) >    Persistent immanuel brainstem intraparenchymal hematoma as described.    < from: CT Angio Head w/ IV Cont (18 @ 10:08) >  Acute pontine hemorrhage, large in size with new dissection into the fourth ventricle.  No hydrocephalus. Unremarkable CTA of the brain.    < from: CT Head No Cont (18 @ 09:33) >  2.2 x 1.0 x 2.2 cm pontine hemorrhage.     ADVANCE DIRECTIVES: Full Code HPI: 63F with PMH as listed admitted  with dizziness and collapse. 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     PERTINENT PMH REVIEWED: Yes     PAST MEDICAL & SURGICAL HISTORY:  Alcohol abuse  No significant past surgical history    SOCIAL HISTORY:  non smoker, + EtOH                                    Admitted from:  home    Surrogate - Capo Walker - spouse     FAMILY HISTORY:  No pertinent family history in first degree relatives    Baseline ADLs (prior to admission):  independent      Allergies    No Known Allergies    Present Symptoms:     Dyspnea: vented   Nausea/Vomiting: No  Anxiety:  No  Depression: No  Fatigue: Yes   Loss of appetite: unable     Pain:             Character-            Duration-            Effect-            Factors-            Frequency-            Location-            Severity-    Review of Systems: Reviewed                     Negative:                     Positive:  Unable to obtain due to poor mentation   All others negative    MEDICATIONS  (STANDING):  chlorhexidine 0.12% Liquid 15 milliLiter(s) Swish and Spit two times a day  niCARdipine Infusion 5 mG/Hr (25 mL/Hr) IV Continuous <Continuous>  pantoprazole  Injectable 40 milliGRAM(s) IV Push daily    PHYSICAL EXAM:    Vital Signs Last 24 Hrs  T(C): 38.2 (2018 12:00), Max: 38.7 (2018 00:00)  T(F): 100.8 (2018 12:00), Max: 101.7 (2018 00:00)  HR: 97 (2018 13:05) (69 - 119)  BP: 152/85 (2018 13:00) (119/70 - 170/85)  BP(mean): 113 (2018 13:00) (86 - 116)  RR: 21 (2018 13:00) (18 - 29)  SpO2: 100% (2018 13:05) (100% - 100%)    General: alert  oriented x ____ lethargic agitated                  cachexia  nonverbal  coma    Karnofsky:  %    HEENT: normal  dry mouth  ET tube/trach    Lungs: comfortable tachypnea/labored breathing  excessive secretions    CV: normal  tachycardia    GI: normal  distended  tender  no BS               PEG/NG/OG tube  constipation  last BM:     : normal  incontinent  oliguria/anuria  bush    MSK: normal  weakness  edema             ambulatory  bedbound/wheelchair bound    Skin: normal  pressure ulcers- Stage_____  no rash    LABS:                      13.1   11.3  )-----------( 195      ( 2018 05:38 )             40.5         143  |  103  |  9.0  ----------------------------<  119<H>  3.6   |  25.0  |  0.60    Ca    9.3      2018 05:38  Phos  3.0       Mg     2.2         TPro  8.9<H>  /  Alb  4.5  /  TBili  0.6  /  DBili  x   /  AST  <5  /  ALT  <5  /  AlkPhos  81      PT/INR - ( 2018 09:50 )   PT: 10.5 sec;   INR: 0.96 ratio       PTT - ( 2018 09:50 )  PTT:25.2 sec  Urinalysis Basic - ( 2018 21:24 )    Color: Yellow / Appearance: Clear / S.010 / pH: x  Gluc: x / Ketone: Moderate  / Bili: Negative / Urobili: Negative mg/dL   Blood: x / Protein: 30 mg/dL / Nitrite: Negative   Leuk Esterase: Negative / RBC: 0-2 /HPF / WBC Negative   Sq Epi: x / Non Sq Epi: Occasional / Bacteria: x    I&O's Summary    2018 07:  -  2018 07:00  --------------------------------------------------------  IN: 450 mL / OUT: 970 mL / NET: -520 mL    2018 07:  -  2018 14:57  --------------------------------------------------------  IN: 100 mL / OUT: 125 mL / NET: -25 mL    RADIOLOGY & ADDITIONAL STUDIES:    < from: CT Head No Cont (18 @ 18:29) >    Persistent immanuel brainstem intraparenchymal hematoma as described.    < from: CT Angio Head w/ IV Cont (04.23.18 @ 10:08) >  Acute pontine hemorrhage, large in size with new dissection into the fourth ventricle.  No hydrocephalus. Unremarkable CTA of the brain.    < from: CT Head No Cont (18 @ 09:33) >  2.2 x 1.0 x 2.2 cm pontine hemorrhage.     ADVANCE DIRECTIVES: Full Code HPI: 63F with PMH as listed admitted  with dizziness and collapse. 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     PERTINENT PMH REVIEWED: Yes     PAST MEDICAL & SURGICAL HISTORY:  Alcohol abuse  No significant past surgical history    SOCIAL HISTORY:  non smoker, + EtOH                                    Admitted from:  home    Surrogate - Capo Walker - spouse     FAMILY HISTORY:  No pertinent family history in first degree relatives    Baseline ADLs (prior to admission):  independent      Allergies    No Known Allergies    Present Symptoms:     Dyspnea: vented   Nausea/Vomiting: No  Anxiety:  No  Depression: No  Fatigue: Yes   Loss of appetite: unable     Pain: none             Character-            Duration-            Effect-            Factors-            Frequency-            Location-            Severity-    Review of Systems: Reviewed                   Unable to obtain due to poor mentation   All others negative    MEDICATIONS  (STANDING):  chlorhexidine 0.12% Liquid 15 milliLiter(s) Swish and Spit two times a day  niCARdipine Infusion 5 mG/Hr (25 mL/Hr) IV Continuous <Continuous>  pantoprazole  Injectable 40 milliGRAM(s) IV Push daily    PHYSICAL EXAM:    Vital Signs Last 24 Hrs  T(C): 38.2 (2018 12:00), Max: 38.7 (2018 00:00)  T(F): 100.8 (2018 12:00), Max: 101.7 (2018 00:00)  HR: 97 (2018 13:05) (69 - 119)  BP: 152/85 (2018 13:00) (119/70 - 170/85)  BP(mean): 113 (2018 13:00) (86 - 116)  RR: 21 (2018 13:00) (18 - 29)  SpO2: 100% (2018 13:05) (100% - 100%)    General: alert; seems to be tracking and follows my basic commands - (squeezing my hands on both UE)    Karnofsky:  20 %    HEENT: ET tube    Lungs: comfortable     CV: normal      GI: normal       : bush    MSK: weakness     Skin:   no rash    LABS:                      13.1   11.3  )-----------( 195      ( 2018 05:38 )             40.5     04-24    143  |  103  |  9.0  ----------------------------<  119<H>  3.6   |  25.0  |  0.60    Ca    9.3      2018 05:38  Phos  3.0     -  Mg     2.2     -    TPro  8.9<H>  /  Alb  4.5  /  TBili  0.6  /  DBili  x   /  AST  <5  /  ALT  <5  /  AlkPhos  81      PT/INR - ( 2018 09:50 )   PT: 10.5 sec;   INR: 0.96 ratio       PTT - ( 2018 09:50 )  PTT:25.2 sec  Urinalysis Basic - ( 2018 21:24 )    Color: Yellow / Appearance: Clear / S.010 / pH: x  Gluc: x / Ketone: Moderate  / Bili: Negative / Urobili: Negative mg/dL   Blood: x / Protein: 30 mg/dL / Nitrite: Negative   Leuk Esterase: Negative / RBC: 0-2 /HPF / WBC Negative   Sq Epi: x / Non Sq Epi: Occasional / Bacteria: x    I&O's Summary    2018 07:  -  2018 07:00  --------------------------------------------------------  IN: 450 mL / OUT: 970 mL / NET: -520 mL    2018 07:  -  2018 14:57  --------------------------------------------------------  IN: 100 mL / OUT: 125 mL / NET: -25 mL    RADIOLOGY & ADDITIONAL STUDIES:    < from: CT Head No Cont (18 @ 18:29) >    Persistent immanuel brainstem intraparenchymal hematoma as described.    < from: CT Angio Head w/ IV Cont (18 @ 10:08) >  Acute pontine hemorrhage, large in size with new dissection into the fourth ventricle.  No hydrocephalus. Unremarkable CTA of the brain.    < from: CT Head No Cont (18 @ 09:33) >  2.2 x 1.0 x 2.2 cm pontine hemorrhage.     ADVANCE DIRECTIVES: Full Code

## 2018-04-24 NOTE — PROGRESS NOTE ADULT - ASSESSMENT
62 y/o F with a h/o EtOH abuse, with acute ICH, acute respiratory failure requiring intubation, AMS, hypertensive emergency.

## 2018-04-24 NOTE — PROGRESS NOTE ADULT - PROBLEM SELECTOR PLAN 1
Cannot exclude relation to uncontrolled hypertension. CTA unremarkable. Repeat CT head showed persistent pontine bleed. q 1 hour neuro checks. BP management with IV Cardene, titrating to maintain SBP < 140. Avoid sedative medications if at all possible in order to allow for true neurological exams. Unfortunately, prognosis is poor. Discussion held with family members regarding severity of ICH and recent imaging results. Cannot exclude relation to uncontrolled hypertension. CTA unremarkable. Repeat CT head showed persistent pontine bleed. q 1 hour neuro checks. BP management with IV Cardene, titrating to maintain SBP < 140. Avoid sedative medications if at all possible in order to allow for true neurological exams. Has developed persistent fever despite IV Tylenol, likely neurogenic in nature- will order cooling blanket and panculture. Unfortunately, prognosis is poor. Discussion held with family members regarding severity of ICH, likely neurological damage, and recent imaging results.

## 2018-04-24 NOTE — PROGRESS NOTE ADULT - SUBJECTIVE AND OBJECTIVE BOX
INTERVAL HPI/OVERNIGHT EVENTS:  Admitted day 1  s/p pontine hemorrhage.    MEDICATIONS  (STANDING):  chlorhexidine 0.12% Liquid 15 milliLiter(s) Swish and Spit two times a day  niCARdipine Infusion 5 mG/Hr (25 mL/Hr) IV Continuous <Continuous>  pantoprazole  Injectable 40 milliGRAM(s) IV Push daily    MEDICATIONS  (PRN):    Allergies  No Known Allergies  Intolerances    Vital Signs Last 24 Hrs  T(C): 37 (2018 08:00), Max: 38.7 (2018 00:00)  T(F): 98.6 (2018 08:00), Max: 101.7 (2018 00:00)  HR: 100 (2018 09:00) (69 - 119)  BP: 130/67 (:00) (110/63 - 203/115)  BP(mean): 92 (2018 09:00) (83 - 152)  RR: 23 (:00) (18 - 33)  SpO2: 100% (2018 09:00) (98% - 100%)    PHYSICAL EXAM:  Pt eyes opens, not tracking, not following commands.  GENERAL: NAD, well-groomed, well-developed  HEAD:  Atraumatic, Normocephalic  EYES: EOMI, PERRLA, conjunctiva and sclera clear, Pupil   ENMT: intubated.   NECK: Supple, No JVD, Normal thyroid  NERVOUS SYSTEM:  not following; moving all extrem withdraws left and extends upper extrem bilat, Withdras lower extremities bilat,   CHEST/LUNG: Clear BS percussion bilaterally; No rales, rhonchi, wheezing, or rubs  HEART: Regular rate and rhythm; No murmurs, rubs, or gallops  ABDOMEN: Soft, Nontender, Nondistended; Bowel sounds present  EXTREMITIES: No edema    LABS:                        13.1   11.3  )-----------( 195      ( 2018 05:38 )             40.5     04-    143  |  103  |  9.0  ----------------------------<  119<H>  3.6   |  25.0  |  0.60    Ca    9.3      2018 05:38  Phos  3.0     04-24  Mg     2.2     -    TPro  8.9<H>  /  Alb  4.5  /  TBili  0.6  /  DBili  x   /  AST  <5  /  ALT  <5  /  AlkPhos  81      PT/INR - ( 2018 09:50 )   PT: 10.5 sec;   INR: 0.96 ratio         PTT - ( 2018 09:50 )  PTT:25.2 sec  Urinalysis Basic - ( 2018 21:24 )    Color: Yellow / Appearance: Clear / S.010 / pH: x  Gluc: x / Ketone: Moderate  / Bili: Negative / Urobili: Negative mg/dL   Blood: x / Protein: 30 mg/dL / Nitrite: Negative   Leuk Esterase: Negative / RBC: 0-2 /HPF / WBC Negative   Sq Epi: x / Non Sq Epi: Occasional / Bacteria: x    RADIOLOGY & ADDITIONAL TESTS:  < from: CT Head No Cont (18 @ 18:29) >   EXAM:  CT BRAIN                          PROCEDURE DATE:  2018      < end of copied text >  < from: CT Head No Cont (18 @ 18:29) >  IMPRESSION:    Persistent immanuel brainstem intraparenchymal hematoma as described.    < end of copied text >

## 2018-04-24 NOTE — PROGRESS NOTE ADULT - PROBLEM SELECTOR PLAN 3
Related to ICH. Continue frequent neuro checks. Low threshold for repeat CT head. Hold all sedative agents if at all possible.

## 2018-04-24 NOTE — CONSULT NOTE ADULT - PROBLEM SELECTOR RECOMMENDATION 3
-met  - Capo, sister Felicity, sister Jeri, and niece at bedside. They are remaining hopeful and giving this some time to see how she does. She seems on my exam late this afternoon to be attempting to track, and she did follow some of my basic commands, need to see the consistency of this. Continued support. Overall prognosis still remains poor, and she will likely at the least need feeding tube, potentially a trach.

## 2018-04-24 NOTE — PROGRESS NOTE ADULT - ASSESSMENT
63yf  s/p hemorrhagic stroke in pontine region  Plan  1. continue blood pressure control.  2. continue to follow hemorrhagic stroke protocol  3.  Neurology consult needed.

## 2018-04-25 LAB
ANION GAP SERPL CALC-SCNC: 13 MMOL/L — SIGNIFICANT CHANGE UP (ref 5–17)
BUN SERPL-MCNC: 12 MG/DL — SIGNIFICANT CHANGE UP (ref 8–20)
CALCIUM SERPL-MCNC: 9.8 MG/DL — SIGNIFICANT CHANGE UP (ref 8.6–10.2)
CHLORIDE SERPL-SCNC: 100 MMOL/L — SIGNIFICANT CHANGE UP (ref 98–107)
CO2 SERPL-SCNC: 28 MMOL/L — SIGNIFICANT CHANGE UP (ref 22–29)
CREAT SERPL-MCNC: 0.55 MG/DL — SIGNIFICANT CHANGE UP (ref 0.5–1.3)
CULTURE RESULTS: NO GROWTH — SIGNIFICANT CHANGE UP
GAS PNL BLDA: SIGNIFICANT CHANGE UP
GLUCOSE SERPL-MCNC: 160 MG/DL — HIGH (ref 70–115)
HCT VFR BLD CALC: 43 % — SIGNIFICANT CHANGE UP (ref 37–47)
HGB BLD-MCNC: 13.9 G/DL — SIGNIFICANT CHANGE UP (ref 12–16)
MAGNESIUM SERPL-MCNC: 2.3 MG/DL — SIGNIFICANT CHANGE UP (ref 1.6–2.6)
MCHC RBC-ENTMCNC: 31.9 PG — HIGH (ref 27–31)
MCHC RBC-ENTMCNC: 32.3 G/DL — SIGNIFICANT CHANGE UP (ref 32–36)
MCV RBC AUTO: 98.6 FL — SIGNIFICANT CHANGE UP (ref 81–99)
PHOSPHATE SERPL-MCNC: 1.7 MG/DL — LOW (ref 2.4–4.7)
PLATELET # BLD AUTO: 164 K/UL — SIGNIFICANT CHANGE UP (ref 150–400)
POTASSIUM SERPL-MCNC: 3.8 MMOL/L — SIGNIFICANT CHANGE UP (ref 3.5–5.3)
POTASSIUM SERPL-SCNC: 3.8 MMOL/L — SIGNIFICANT CHANGE UP (ref 3.5–5.3)
RBC # BLD: 4.36 M/UL — LOW (ref 4.4–5.2)
RBC # FLD: 14.1 % — SIGNIFICANT CHANGE UP (ref 11–15.6)
SODIUM SERPL-SCNC: 141 MMOL/L — SIGNIFICANT CHANGE UP (ref 135–145)
SPECIMEN SOURCE: SIGNIFICANT CHANGE UP
WBC # BLD: 12.8 K/UL — HIGH (ref 4.8–10.8)
WBC # FLD AUTO: 12.8 K/UL — HIGH (ref 4.8–10.8)

## 2018-04-25 PROCEDURE — 99233 SBSQ HOSP IP/OBS HIGH 50: CPT

## 2018-04-25 PROCEDURE — 71045 X-RAY EXAM CHEST 1 VIEW: CPT | Mod: 26

## 2018-04-25 PROCEDURE — 70450 CT HEAD/BRAIN W/O DYE: CPT | Mod: 26

## 2018-04-25 PROCEDURE — 99291 CRITICAL CARE FIRST HOUR: CPT

## 2018-04-25 RX ORDER — DOCUSATE SODIUM 100 MG
100 CAPSULE ORAL
Qty: 0 | Refills: 0 | Status: DISCONTINUED | OUTPATIENT
Start: 2018-04-25 | End: 2018-04-26

## 2018-04-25 RX ORDER — SENNA PLUS 8.6 MG/1
2 TABLET ORAL AT BEDTIME
Qty: 0 | Refills: 0 | Status: DISCONTINUED | OUTPATIENT
Start: 2018-04-25 | End: 2018-05-25

## 2018-04-25 RX ORDER — POLYETHYLENE GLYCOL 3350 17 G/17G
17 POWDER, FOR SOLUTION ORAL DAILY
Qty: 0 | Refills: 0 | Status: DISCONTINUED | OUTPATIENT
Start: 2018-04-25 | End: 2018-05-25

## 2018-04-25 RX ORDER — HYDRALAZINE HCL 50 MG
20 TABLET ORAL EVERY 6 HOURS
Qty: 0 | Refills: 0 | Status: DISCONTINUED | OUTPATIENT
Start: 2018-04-25 | End: 2018-05-25

## 2018-04-25 RX ORDER — POTASSIUM PHOSPHATE, MONOBASIC POTASSIUM PHOSPHATE, DIBASIC 236; 224 MG/ML; MG/ML
30 INJECTION, SOLUTION INTRAVENOUS ONCE
Qty: 0 | Refills: 0 | Status: COMPLETED | OUTPATIENT
Start: 2018-04-25 | End: 2018-04-25

## 2018-04-25 RX ORDER — LABETALOL HCL 100 MG
20 TABLET ORAL
Qty: 0 | Refills: 0 | Status: DISCONTINUED | OUTPATIENT
Start: 2018-04-25 | End: 2018-05-11

## 2018-04-25 RX ORDER — ACETAMINOPHEN 500 MG
1000 TABLET ORAL ONCE
Qty: 0 | Refills: 0 | Status: COMPLETED | OUTPATIENT
Start: 2018-04-25 | End: 2018-04-25

## 2018-04-25 RX ADMIN — CHLORHEXIDINE GLUCONATE 15 MILLILITER(S): 213 SOLUTION TOPICAL at 18:15

## 2018-04-25 RX ADMIN — CHLORHEXIDINE GLUCONATE 15 MILLILITER(S): 213 SOLUTION TOPICAL at 06:23

## 2018-04-25 RX ADMIN — SENNA PLUS 2 TABLET(S): 8.6 TABLET ORAL at 23:46

## 2018-04-25 RX ADMIN — Medication 1 MILLIGRAM(S): at 11:41

## 2018-04-25 RX ADMIN — POTASSIUM PHOSPHATE, MONOBASIC POTASSIUM PHOSPHATE, DIBASIC 83.33 MILLIMOLE(S): 236; 224 INJECTION, SOLUTION INTRAVENOUS at 09:15

## 2018-04-25 RX ADMIN — POLYETHYLENE GLYCOL 3350 17 GRAM(S): 17 POWDER, FOR SOLUTION ORAL at 14:34

## 2018-04-25 RX ADMIN — PANTOPRAZOLE SODIUM 40 MILLIGRAM(S): 20 TABLET, DELAYED RELEASE ORAL at 11:41

## 2018-04-25 RX ADMIN — Medication 400 MILLIGRAM(S): at 18:14

## 2018-04-25 RX ADMIN — NICARDIPINE HYDROCHLORIDE 25 MG/HR: 30 CAPSULE, EXTENDED RELEASE ORAL at 18:14

## 2018-04-25 RX ADMIN — Medication 100 MILLIGRAM(S): at 11:41

## 2018-04-25 NOTE — PROGRESS NOTE ADULT - SUBJECTIVE AND OBJECTIVE BOX
INTERVAL HPI/OVERNIGHT EVENTS:  Admission day #2  S/o hemorrhagic stroke, Pontine bleed.    MEDICATIONS  (STANDING):  chlorhexidine 0.12% Liquid 15 milliLiter(s) Swish and Spit two times a day  folic acid 1 milliGRAM(s) Oral daily  niCARdipine Infusion 5 mG/Hr (25 mL/Hr) IV Continuous <Continuous>  pantoprazole  Injectable 40 milliGRAM(s) IV Push daily  thiamine 100 milliGRAM(s) Oral daily    MEDICATIONS  (PRN):    Allergies  No Known Allergies  Intolerances      Vital Signs Last 24 Hrs  T(C): 38 (2018 09:00), Max: 38.6 (2018 23:00)  T(F): 100.4 (2018 09:00), Max: 101.5 (2018 23:00)  HR: 90 (2018 09:35) (66 - 113)  BP: 142/80 (2018 09:30) (102/56 - 176/94)  BP(mean): 106 (2018 09:30) (72 - 128)  RR: 23 (2018 09:30) (11 - 27)  SpO2: 100% (2018 09:35) (99% - 100%)    PHYSICAL EXAM: Pt intubated, on Cardene taper.   eyes open, bulking and coughing spont, appears to nod when spoken, Not clearly tracking, not following commands, moved pointer finger on the left hand.   GENERAL: NAD, well-groomed, well-developed  HEAD:  Atraumatic, Normocephalic  EYES: EOMI, PERRLA, conjunctiva and sclera clear, pin point bilat.   ENMT: Intubated.   NECK: Supple, No JVD, Normal thyroid  NERVOUS SYSTEM:  lethargic, moves extrem to noxious, grasp intermittently not upon command.   CHEST/LUNG: Clear BS bilaterally; No rales, rhonchi, wheezing, or rubs  HEART: Regular rate and rhythm; No murmurs, rubs, or gallops  ABDOMEN: Soft, Nontender, Nondistended; Bowel sounds present  EXTREMITIES:  2+ Peripheral Pulses, No clubbing, cyanosis, or edema  LYMPH: No lymphadenopathy noted  SKIN: No rashes or lesions    LABS:                        13.9   12.8  )-----------( 164      ( 2018 06:45 )             43.0     -    141  |  100  |  12.0  ----------------------------<  160<H>  3.8   |  28.0  |  0.55    Ca    9.8      2018 06:45  Phos  1.7       Mg     2.3         Urinalysis Basic - ( 2018 21:24 )  <  Color: Yellow / Appearance: Clear / S.010 / pH: x  Gluc: x / Ketone: Moderate  / Bili: Negative / Urobili: Negative mg/dL   Blood: x / Protein: 30 mg/dL / Nitrite: Negative   Leuk Esterase: Negative / RBC: 0-2 /HPF / WBC Negative   Sq Epi: x / Non Sq Epi: Occasional / Bacteria: x    RADIOLOGY & ADDITIONAL TESTS:   from: CT Head No Cont (18 @ 18:29) >   EXAM:  CT BRAIN                        Persistent acute hemorrhage of the brainstem immanuel a again seen now   measuring 2.5 cm AP diameter, 1.8 cm in height 2.5 cm in transverse   diameter previously measuring 2.2 x 1.2 x 2.2 cm. There is compression of   the adjacent fourth ventricle without intraventricular hemorrhage. The   ambient cisterns, suprasellar cisterns, subarachnoid space and   extra-axial spaces appear normal. No hydrocephalus seen.  The ventricles and sulci are normal for the patient's age.   The posterior fossa structures and basal cisterns appear normal.    There is no midline shift.   There are scattered white matter hypodensities consistent with small   vessel disease.  Chronic right toe focal basal ganglia lacunar infarct noted..  The visualized portions of the optic globes and paranasal sinuses are   normal.  There are no skull fractures.  IMPRESSION:  Persistent immanuel brainstem intraparenchymal hematoma as described.  < end of copied text >

## 2018-04-25 NOTE — PROGRESS NOTE ADULT - ASSESSMENT
63 yof with ICH, acute respiratory failure    CCT 50 min    Had discussion with  - need to discuss possiblity of trach/peg, nursing home if no further improvement verse comfort measures plan family meeting in am

## 2018-04-25 NOTE — PROGRESS NOTE ADULT - PROBLEM SELECTOR PLAN 3
Improving. Related to ICH. Continue frequent neuro checks. Low threshold for repeat CT head. Hold all sedative agents if at all possible.

## 2018-04-25 NOTE — PROGRESS NOTE ADULT - PROBLEM SELECTOR PLAN 1
- appears to nod at verbal stimulation, not clearly following commands today for me, prognosis appears poor.

## 2018-04-25 NOTE — PROGRESS NOTE ADULT - ATTENDING COMMENTS
NSGY Attg:    see above    patient seen and examined    Exam:  eyes open spontaneously  PERRL  intermittent roving eye movements  patient does not tract  ET tube in place  extends with LUE to central noxious stimulation    A/P:  pontine ICH with exam as above  likely poor prognosis for meaningful function recovery  continue supportive care  rec: palliative care consultation

## 2018-04-25 NOTE — PROGRESS NOTE ADULT - PROBLEM SELECTOR PLAN 3
-supported  at bedside. he is very tearful today, I explained to him the toughest part of neurological injury is the "gray zone" of not really seeing her improve to the degree we hope, but maybe seeing some "positive signs." He wishes that they would have discussed her wishes before she got so sick. ICU has scheduled a family meeting today for 5pm to discuss overall options and goals.

## 2018-04-25 NOTE — DIETITIAN INITIAL EVALUATION ADULT. - PERTINENT LABORATORY DATA
04-25 Na141 mmol/L Glu 160 mg/dL<H> K+ 3.8 mmol/L Cr  0.55 mg/dL BUN 12.0 mg/dL Phos 1.7 mg/dL<L> Alb n/a   PAB n/a

## 2018-04-25 NOTE — PROGRESS NOTE ADULT - SUBJECTIVE AND OBJECTIVE BOX
Patient is a 63y old  Female who presents with a chief complaint of complained of dizzyness and collapsed at home (2018 10:59)      BRIEF HOSPITAL COURSE: 62 y/o F with a h/o EtOH abuse, admitted on  with dizziness and syncope. Upon arrival in the ED she was obtunded and intubated for airway support. Her BP was noted to be elevated. 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 There was no evidence of aneurysm or dissection noted on CTA. IV Cardene initiated for BP control. Poor neurological exam. Neurosurgery not recommending surgical intervention at this point.    Events last 24 hours: Patient remains intubated on full vent support. Cardene infusion currently off. Neuro exam has improved- now responsive to verbal stimuli, nodding and shaking head somewhat appropriately, tracking. B/l UE not responsive to noxious stimuli and without spontaneous movement, however, she nods her head yes when asked if she feels me touching all 4 extremities, b/l LE continue to withdraw to pain but without spontaneous movement. Overbreathing ventilator. Fever persists despite cooling blanket.    PAST MEDICAL & SURGICAL HISTORY:  Alcohol abuse  No significant past surgical history      Review of Systems: Unable to obtain as patient is intubated.        Medications:    niCARdipine Infusion 5 mG/Hr IV Continuous <Continuous>  pantoprazole  Injectable 40 milliGRAM(s) IV Push daily  folic acid 1 milliGRAM(s) Oral daily  thiamine 100 milliGRAM(s) Oral daily  chlorhexidine 0.12% Liquid 15 milliLiter(s) Swish and Spit two times a day        Mode: AC/ CMV (Assist Control/ Continuous Mandatory Ventilation)  RR (machine): 18  TV (machine): 400  FiO2: 25  PEEP: 5  MAP: 8  PIP: 19      ICU Vital Signs Last 24 Hrs  T(C): 37.6 (2018 02:00), Max: 38.6 (2018 23:00)  T(F): 99.7 (2018 02:00), Max: 101.5 (2018 23:00)  HR: 66 (2018 03:00) (66 - 119)  BP: 125/69 (2018 03:00) (102/56 - 170/85)  BP(mean): 92 (2018 03:00) (72 - 116)  ABP: --  ABP(mean): --  RR: 18 (2018 03:00) (18 - 29)  SpO2: 100% (2018 03:00) (100% - 100%)      ABG - ( 2018 10:19 )  pH, Arterial: 7.49  pH, Blood: x     /  pCO2: 33    /  pO2: 447   / HCO3: 26    / Base Excess: 2.1   /  SaO2: x                   I&O's Detail    2018 07:01  -  2018 07:00  --------------------------------------------------------  IN:    niCARdipine Infusion: 350 mL    Solution: 100 mL  Total IN: 450 mL    OUT:    Indwelling Catheter - Urethral: 970 mL  Total OUT: 970 mL    Total NET: -520 mL      2018 07:01  -  2018 03:04  --------------------------------------------------------  IN:    Jevity: 660 mL    niCARdipine Infusion: 205 mL    Oral Fluid: 300 mL    Solution: 100 mL  Total IN: 1265 mL    OUT:    Indwelling Catheter - Urethral: 325 mL    Voided: 200 mL  Total OUT: 525 mL    Total NET: 740 mL            LABS:                        13.1   11.3  )-----------( 195      ( 2018 05:38 )             40.5         143  |  103  |  9.0  ----------------------------<  119<H>  3.6   |  25.0  |  0.60    Ca    9.3      2018 05:38  Phos  3.0       Mg     2.2         TPro  8.9<H>  /  Alb  4.5  /  TBili  0.6  /  DBili  x   /  AST  <5  /  ALT  <5  /  AlkPhos  81        CARDIAC MARKERS ( 2018 09:50 )  x     / <0.01 ng/mL / 177 U/L / x     / 3.1 ng/mL      CAPILLARY BLOOD GLUCOSE        PT/INR - ( 2018 09:50 )   PT: 10.5 sec;   INR: 0.96 ratio         PTT - ( 2018 09:50 )  PTT:25.2 sec  Urinalysis Basic - ( 2018 21:24 )    Color: Yellow / Appearance: Clear / S.010 / pH: x  Gluc: x / Ketone: Moderate  / Bili: Negative / Urobili: Negative mg/dL   Blood: x / Protein: 30 mg/dL / Nitrite: Negative   Leuk Esterase: Negative / RBC: 0-2 /HPF / WBC Negative   Sq Epi: x / Non Sq Epi: Occasional / Bacteria: x      CULTURES:      Physical Examination:    General: No acute distress.  intubated,  will open eyes, track, nod/shake head    HEENT: Pupils reactive  Symmetric.    PULM: Clear to auscultation bilaterally, no significant sputum production    CVS: Regular rate and rhythm, no murmurs, rubs, or gallops    ABD: Soft, nondistended, nontender, normoactive bowel sounds, no masses    EXT: No edema, nontender    SKIN: Warm and well perfused, no rashes noted.    NEURO: see HPI      RADIOLOGY:     < from: Xray Chest 1 View- PORTABLE-Urgent (18 @ 13:29) >  NG tube has been placed with the tip overlying the stomach. The proximal   port is in this distal esophagus and therefore this tube should be   advanced. ET tube is again seen with the tip above the tk. The lungs   are clear. Osseous structures are intact.    Impression: NG tube with the tip in the proximal stomach. This should be   advanced as the proximal port is in the distal esophagus.      < from: CT Head No Cont (18 @ 18:29) >  FINDINGS:  Persistent acute hemorrhage of the brainstem immanuel a again seen now   measuring 2.5 cm AP diameter, 1.8 cm in height 2.5 cm in transverse   diameter previously measuring 2.2 x 1.2 x 2.2 cm. There is compression of   the adjacent fourth ventricle without intraventricular hemorrhage. The   ambient cisterns, suprasellar cisterns, subarachnoid space and   extra-axial spaces appear normal. No hydrocephalus seen.  The ventricles and sulci are normal for the patient's age.   The posterior fossa structures and basal cisterns appear normal.    There is no midline shift.   There are scattered white matter hypodensities consistent with small   vessel disease.    Chronic right toe focal basal ganglia lacunar infarct noted..    The visualized portions of the optic globes and paranasal sinuses are   normal.    There are no skull fractures.    IMPRESSION:    Persistent immanuel brainstem intraparenchymal hematoma as described.        CRITICAL CARE TIME SPENT: 40 mins  Evaluating/treating patient, reviewing data/labs/imaging, discussing case with multidisciplinary team, discussing plan/goals of care with patient/family. Non-inclusive of procedure time.

## 2018-04-25 NOTE — PROGRESS NOTE ADULT - SUBJECTIVE AND OBJECTIVE BOX
Patient is a 63y old  Female who presents with a chief complaint of complained of dizzyness and collapsed at home (2018 10:59)      BRIEF HOSPITAL COURSE: ***    Events last 24 hours: ***    PAST MEDICAL & SURGICAL HISTORY:  Alcohol abuse  No significant past surgical history      Review of Systems:  CONSTITUTIONAL: No fever, chills, or fatigue  EYES: No eye pain, visual disturbances, or discharge  ENMT:  No difficulty hearing, tinnitus, vertigo; No sinus or throat pain  NECK: No pain or stiffness  RESPIRATORY: No cough, wheezing, chills or hemoptysis; No shortness of breath  CARDIOVASCULAR: No chest pain, palpitations, dizziness, or leg swelling  GASTROINTESTINAL: No abdominal or epigastric pain. No nausea, vomiting, or hematemesis; No diarrhea or constipation. No melena or hematochezia.  GENITOURINARY: No dysuria, frequency, hematuria, or incontinence  NEUROLOGICAL: No headaches, memory loss, loss of strength, numbness, or tremors  SKIN: No itching, burning, rashes, or lesions   MUSCULOSKELETAL: No joint pain or swelling; No muscle, back, or extremity pain  PSYCHIATRIC: No depression, anxiety, mood swings, or difficulty sleeping      Medications:    niCARdipine Infusion 5 mG/Hr IV Continuous <Continuous>            pantoprazole  Injectable 40 milliGRAM(s) IV Push daily        folic acid 1 milliGRAM(s) Oral daily  thiamine 100 milliGRAM(s) Oral daily      chlorhexidine 0.12% Liquid 15 milliLiter(s) Swish and Spit two times a day        Mode: CPAP with PS  FiO2: 25  PEEP: 5  PS: 10  MAP: 8      ICU Vital Signs Last 24 Hrs  T(C): 36.6 (2018 08:00), Max: 38.6 (2018 23:00)  T(F): 97.9 (2018 08:00), Max: 101.5 (2018 23:00)  HR: 107 (2018 08:45) (66 - 113)  BP: 162/81 (2018 08:45) (102/56 - 176/94)  BP(mean): 114 (2018 08:45) (72 - 128)  ABP: --  ABP(mean): --  RR: 24 (2018 08:45) (11 - 27)  SpO2: 99% (2018 08:45) (99% - 100%)      ABG - ( 2018 10:19 )  pH, Arterial: 7.49  pH, Blood: x     /  pCO2: 33    /  pO2: 447   / HCO3: 26    / Base Excess: 2.1   /  SaO2: x                   I&O's Detail    2018 07:01  -  2018 07:00  --------------------------------------------------------  IN:    Jevity: 960 mL    niCARdipine Infusion: 305 mL    Oral Fluid: 300 mL    Solution: 100 mL  Total IN: 1665 mL    OUT:    Indwelling Catheter - Urethral: 325 mL    Voided: 385 mL  Total OUT: 710 mL    Total NET: 955 mL      2018 07:01  -  2018 09:22  --------------------------------------------------------  IN:    Jevity: 120 mL  Total IN: 120 mL    OUT:    Voided: 35 mL  Total OUT: 35 mL    Total NET: 85 mL            LABS:                        13.9   12.8  )-----------( 164      ( 2018 06:45 )             43.0     04-25    141  |  100  |  12.0  ----------------------------<  160<H>  3.8   |  28.0  |  0.55    Ca    9.8      2018 06:45  Phos  1.7     04-25  Mg     2.3     04-25    TPro  8.9<H>  /  Alb  4.5  /  TBili  0.6  /  DBili  x   /  AST  <5  /  ALT  <5  /  AlkPhos  81  04-23      CARDIAC MARKERS ( 2018 09:50 )  x     / <0.01 ng/mL / 177 U/L / x     / 3.1 ng/mL      CAPILLARY BLOOD GLUCOSE        PT/INR - ( 2018 09:50 )   PT: 10.5 sec;   INR: 0.96 ratio         PTT - ( 2018 09:50 )  PTT:25.2 sec  Urinalysis Basic - ( 2018 21:24 )    Color: Yellow / Appearance: Clear / S.010 / pH: x  Gluc: x / Ketone: Moderate  / Bili: Negative / Urobili: Negative mg/dL   Blood: x / Protein: 30 mg/dL / Nitrite: Negative   Leuk Esterase: Negative / RBC: 0-2 /HPF / WBC Negative   Sq Epi: x / Non Sq Epi: Occasional / Bacteria: x      CULTURES:      Physical Examination:    General: No acute distress.  Alert, oriented, interactive, nonfocal    HEENT: Pupils equal, reactive to light.  Symmetric.    PULM: Clear to auscultation bilaterally, no significant sputum production    CVS: Regular rate and rhythm, no murmurs, rubs, or gallops    ABD: Soft, nondistended, nontender, normoactive bowel sounds, no masses    EXT: No edema, nontender    SKIN: Warm and well perfused, no rashes noted.    RADIOLOGY: ***    CRITICAL CARE TIME SPENT: *** Patient is a 63y old  Female who presents with a chief complaint of complained of dizzyness and collapsed at home (2018 10:59)      BRIEF HOSPITAL COURSE: 63 yof with pontine hemorrhage    Events last 24 hours: no acute issues    PAST MEDICAL & SURGICAL HISTORY:  Alcohol abuse  No significant past surgical history      Review of Systems:  CONSTITUTIONAL:   unable to obtain      Medications:    niCARdipine Infusion 5 mG/Hr IV Continuous <Continuous>    pantoprazole  Injectable 40 milliGRAM(s) IV Push daily        folic acid 1 milliGRAM(s) Oral daily  thiamine 100 milliGRAM(s) Oral daily      chlorhexidine 0.12% Liquid 15 milliLiter(s) Swish and Spit two times a day        Mode: CPAP with PS  FiO2: 25  PEEP: 5  PS: 10  MAP: 8      ICU Vital Signs Last 24 Hrs  T(C): 36.6 (2018 08:00), Max: 38.6 (2018 23:00)  T(F): 97.9 (2018 08:00), Max: 101.5 (2018 23:00)  HR: 107 (2018 08:45) (66 - 113)  BP: 162/81 (2018 08:45) (102/56 - 176/94)  BP(mean): 114 (2018 08:45) (72 - 128)  ABP: --  ABP(mean): --  RR: 24 (2018 08:45) (11 - 27)  SpO2: 99% (2018 08:45) (99% - 100%)      ABG - ( 2018 10:19 )  pH, Arterial: 7.49  pH, Blood: x     /  pCO2: 33    /  pO2: 447   / HCO3: 26    / Base Excess: 2.1   /  SaO2: x                   I&O's Detail    2018 07:01  -  2018 07:00  --------------------------------------------------------  IN:    Jevity: 960 mL    niCARdipine Infusion: 305 mL    Oral Fluid: 300 mL    Solution: 100 mL  Total IN: 1665 mL    OUT:    Indwelling Catheter - Urethral: 325 mL    Voided: 385 mL  Total OUT: 710 mL    Total NET: 955 mL      2018 07:01  -  2018 09:22  --------------------------------------------------------  IN:    Jevity: 120 mL  Total IN: 120 mL    OUT:    Voided: 35 mL  Total OUT: 35 mL    Total NET: 85 mL            LABS:                        13.9   12.8  )-----------( 164      ( 2018 06:45 )             43.0     04-25    141  |  100  |  12.0  ----------------------------<  160<H>  3.8   |  28.0  |  0.55    Ca    9.8      2018 06:45  Phos  1.7     -  Mg     2.3         TPro  8.9<H>  /  Alb  4.5  /  TBili  0.6  /  DBili  x   /  AST  <5  /  ALT  <5  /  AlkPhos  81  04-23      CARDIAC MARKERS ( 2018 09:50 )  x     / <0.01 ng/mL / 177 U/L / x     / 3.1 ng/mL      CAPILLARY BLOOD GLUCOSE        PT/INR - ( 2018 09:50 )   PT: 10.5 sec;   INR: 0.96 ratio         PTT - ( 2018 09:50 )  PTT:25.2 sec  Urinalysis Basic - ( 2018 21:24 )    Color: Yellow / Appearance: Clear / S.010 / pH: x  Gluc: x / Ketone: Moderate  / Bili: Negative / Urobili: Negative mg/dL   Blood: x / Protein: 30 mg/dL / Nitrite: Negative   Leuk Esterase: Negative / RBC: 0-2 /HPF / WBC Negative   Sq Epi: x / Non Sq Epi: Occasional / Bacteria: x      CULTURES:      Physical Examination:    General: No acute distress.      HEENT: Pupils equal, reactive to light.  Symmetric.    PULM: Clear to auscultation bilaterally, no significant sputum production    CVS: Regular rate and rhythm, no murmurs, rubs, or gallops    ABD: Soft, nondistended, nontender, normoactive bowel sounds, no masses    EXT: No edema, nontender    SKIN: Warm and well perfused, no rashes noted.    Neuro: opens eyes, will move with noxious stimuli, no following commands

## 2018-04-25 NOTE — PROGRESS NOTE ADULT - SUBJECTIVE AND OBJECTIVE BOX
OVERNIGHT EVENTS: remains vent dependent, intermittently appears to be tracking, ? following commands    Present Symptoms:     Dyspnea: vented   Nausea/Vomiting: No  Anxiety:  unable   Depression unable   Fatigue: unable   Loss of appetite: unable     Pain: none             Character-            Duration-            Effect-            Factors-            Frequency-            Location-            Severity-    Review of Systems: Reviewed                   Unable to obtain due to poor mentation   All others negative    MEDICATIONS  (STANDING):  chlorhexidine 0.12% Liquid 15 milliLiter(s) Swish and Spit two times a day  docusate sodium 100 milliGRAM(s) Oral two times a day  folic acid 1 milliGRAM(s) Oral daily  niCARdipine Infusion 5 mG/Hr (25 mL/Hr) IV Continuous <Continuous>  pantoprazole  Injectable 40 milliGRAM(s) IV Push daily  senna 2 Tablet(s) Oral at bedtime  thiamine 100 milliGRAM(s) Oral daily    MEDICATIONS  (PRN):  polyethylene glycol 3350 17 Gram(s) Oral daily PRN Constipation    PHYSICAL EXAM:    Vital Signs Last 24 Hrs  T(C): 38 (2018 10:00), Max: 38.6 (2018 23:00)  T(F): 100.4 (2018 10:00), Max: 101.5 (2018 23:00)  HR: 87 (2018 11:00) (66 - 107)  BP: 142/83 (2018 11:00) (102/56 - 176/94)  BP(mean): 97 (2018 10:00) (72 - 128)  RR: 20 (2018 11:00) (11 - 27)  SpO2: 100% (2018 11:00) (99% - 100%)    General: alert     Karnofsky:  30 %    HEENT: ET tube     Lungs: comfortable    CV: normal      GI: normal     : bush    MSK: weakness    Skin: no rash    LABS:                      13.9   12.8  )-----------( 164      ( 2018 06:45 )             43.0         141  |  100  |  12.0  ----------------------------<  160<H>  3.8   |  28.0  |  0.55    Ca    9.8      2018 06:45  Phos  1.7       Mg     2.3         Urinalysis Basic - ( 2018 21:24 )    Color: Yellow / Appearance: Clear / S.010 / pH: x  Gluc: x / Ketone: Moderate  / Bili: Negative / Urobili: Negative mg/dL   Blood: x / Protein: 30 mg/dL / Nitrite: Negative   Leuk Esterase: Negative / RBC: 0-2 /HPF / WBC Negative   Sq Epi: x / Non Sq Epi: Occasional / Bacteria: x    I&O's Summary    2018 07:  -  2018 07:00  --------------------------------------------------------  IN: 1665 mL / OUT: 710 mL / NET: 955 mL    2018 07:  -  2018 12:10  --------------------------------------------------------  IN: 325 mL / OUT: 85 mL / NET: 240 mL    RADIOLOGY & ADDITIONAL STUDIES:    ADVANCE DIRECTIVES: Full Code

## 2018-04-25 NOTE — PROGRESS NOTE ADULT - PROBLEM SELECTOR PLAN 2
Related to ICH and AMS. Continue mechanical ventilation with full support, titrating to maintain SaO2 > 92%. Comfortable off sedation. Will assess mental status later in the morning for possible SBT.

## 2018-04-25 NOTE — PROGRESS NOTE ADULT - ATTENDING COMMENTS
Thank you for the opportunity to assist with the care of this patient.   [facility] [phone].

## 2018-04-25 NOTE — PROGRESS NOTE ADULT - PROBLEM SELECTOR PLAN 4
Cardene currently off. Will reinitiate for strict BP control if necessary. Can start outpatient antihypertensive regimen now with NGT in place- need to ask family if they have her medications.

## 2018-04-25 NOTE — PROGRESS NOTE ADULT - PROBLEM SELECTOR PLAN 1
Cannot exclude relation to uncontrolled hypertension. CTA unremarkable. Repeat CT head showed persistent pontine bleed. q 1 hour neuro checks. BP management with IV Cardene, titrating to maintain SBP < 150, currently weaned off. Avoid sedative medications if at all possible in order to allow for true neurological exams. Has developed persistent fever despite cooling blanket, likely neurogenic in nature- another dose of IV Tylenol given as temp rising now. F/u pancultures. Observing off antibiotics for the time being. No clear source of infection.

## 2018-04-25 NOTE — PROGRESS NOTE ADULT - ASSESSMENT
63yf s/p pontine bleed  Plan:  1. continue blood pressure control  2. Pt's  spoken to about the pt's potential need for trach/peg vs. supportive care towards progression of the disease progress.

## 2018-04-26 DIAGNOSIS — G93.40 ENCEPHALOPATHY, UNSPECIFIED: ICD-10-CM

## 2018-04-26 LAB
ANION GAP SERPL CALC-SCNC: 13 MMOL/L — SIGNIFICANT CHANGE UP (ref 5–17)
ANION GAP SERPL CALC-SCNC: 13 MMOL/L — SIGNIFICANT CHANGE UP (ref 5–17)
APPEARANCE UR: CLEAR — SIGNIFICANT CHANGE UP
BILIRUB UR-MCNC: NEGATIVE — SIGNIFICANT CHANGE UP
BUN SERPL-MCNC: 12 MG/DL — SIGNIFICANT CHANGE UP (ref 8–20)
BUN SERPL-MCNC: 13 MG/DL — SIGNIFICANT CHANGE UP (ref 8–20)
CALCIUM SERPL-MCNC: 9.3 MG/DL — SIGNIFICANT CHANGE UP (ref 8.6–10.2)
CALCIUM SERPL-MCNC: 9.8 MG/DL — SIGNIFICANT CHANGE UP (ref 8.6–10.2)
CHLORIDE SERPL-SCNC: 102 MMOL/L — SIGNIFICANT CHANGE UP (ref 98–107)
CHLORIDE SERPL-SCNC: 104 MMOL/L — SIGNIFICANT CHANGE UP (ref 98–107)
CO2 SERPL-SCNC: 28 MMOL/L — SIGNIFICANT CHANGE UP (ref 22–29)
CO2 SERPL-SCNC: 28 MMOL/L — SIGNIFICANT CHANGE UP (ref 22–29)
COLOR SPEC: YELLOW — SIGNIFICANT CHANGE UP
CREAT SERPL-MCNC: 0.47 MG/DL — LOW (ref 0.5–1.3)
CREAT SERPL-MCNC: 0.54 MG/DL — SIGNIFICANT CHANGE UP (ref 0.5–1.3)
DIFF PNL FLD: ABNORMAL
GLUCOSE SERPL-MCNC: 163 MG/DL — HIGH (ref 70–115)
GLUCOSE SERPL-MCNC: 200 MG/DL — HIGH (ref 70–115)
GLUCOSE UR QL: 100 MG/DL
HCT VFR BLD CALC: 41.2 % — SIGNIFICANT CHANGE UP (ref 37–47)
HGB BLD-MCNC: 13.2 G/DL — SIGNIFICANT CHANGE UP (ref 12–16)
KETONES UR-MCNC: NEGATIVE — SIGNIFICANT CHANGE UP
LEUKOCYTE ESTERASE UR-ACNC: ABNORMAL
MAGNESIUM SERPL-MCNC: 2.2 MG/DL — SIGNIFICANT CHANGE UP (ref 1.6–2.6)
MAGNESIUM SERPL-MCNC: 2.2 MG/DL — SIGNIFICANT CHANGE UP (ref 1.6–2.6)
MCHC RBC-ENTMCNC: 31.6 PG — HIGH (ref 27–31)
MCHC RBC-ENTMCNC: 32 G/DL — SIGNIFICANT CHANGE UP (ref 32–36)
MCV RBC AUTO: 98.6 FL — SIGNIFICANT CHANGE UP (ref 81–99)
NITRITE UR-MCNC: NEGATIVE — SIGNIFICANT CHANGE UP
PH UR: 6.5 — SIGNIFICANT CHANGE UP (ref 5–8)
PHOSPHATE SERPL-MCNC: 1.6 MG/DL — LOW (ref 2.4–4.7)
PHOSPHATE SERPL-MCNC: 1.8 MG/DL — LOW (ref 2.4–4.7)
PLATELET # BLD AUTO: 192 K/UL — SIGNIFICANT CHANGE UP (ref 150–400)
POTASSIUM SERPL-MCNC: 3.6 MMOL/L — SIGNIFICANT CHANGE UP (ref 3.5–5.3)
POTASSIUM SERPL-MCNC: 3.7 MMOL/L — SIGNIFICANT CHANGE UP (ref 3.5–5.3)
POTASSIUM SERPL-SCNC: 3.6 MMOL/L — SIGNIFICANT CHANGE UP (ref 3.5–5.3)
POTASSIUM SERPL-SCNC: 3.7 MMOL/L — SIGNIFICANT CHANGE UP (ref 3.5–5.3)
PROCALCITONIN SERPL-MCNC: <0.05 NG/ML — HIGH (ref 0–0.04)
PROT UR-MCNC: 30 MG/DL
RBC # BLD: 4.18 M/UL — LOW (ref 4.4–5.2)
RBC # FLD: 14 % — SIGNIFICANT CHANGE UP (ref 11–15.6)
RBC CASTS # UR COMP ASSIST: SIGNIFICANT CHANGE UP /HPF (ref 0–4)
SODIUM SERPL-SCNC: 143 MMOL/L — SIGNIFICANT CHANGE UP (ref 135–145)
SODIUM SERPL-SCNC: 145 MMOL/L — SIGNIFICANT CHANGE UP (ref 135–145)
SP GR SPEC: 1.01 — SIGNIFICANT CHANGE UP (ref 1.01–1.02)
UROBILINOGEN FLD QL: 1 MG/DL
WBC # BLD: 12.6 K/UL — HIGH (ref 4.8–10.8)
WBC # FLD AUTO: 12.6 K/UL — HIGH (ref 4.8–10.8)
WBC UR QL: SIGNIFICANT CHANGE UP /HPF (ref 0–5)

## 2018-04-26 PROCEDURE — 99223 1ST HOSP IP/OBS HIGH 75: CPT

## 2018-04-26 PROCEDURE — 74018 RADEX ABDOMEN 1 VIEW: CPT | Mod: 26

## 2018-04-26 PROCEDURE — 99291 CRITICAL CARE FIRST HOUR: CPT

## 2018-04-26 RX ORDER — LACTULOSE 10 G/15ML
20 SOLUTION ORAL EVERY 6 HOURS
Qty: 0 | Refills: 0 | Status: DISCONTINUED | OUTPATIENT
Start: 2018-04-26 | End: 2018-05-25

## 2018-04-26 RX ORDER — LACTULOSE 10 G/15ML
20 SOLUTION ORAL EVERY 12 HOURS
Qty: 0 | Refills: 0 | Status: DISCONTINUED | OUTPATIENT
Start: 2018-04-26 | End: 2018-04-26

## 2018-04-26 RX ORDER — AMLODIPINE BESYLATE 2.5 MG/1
5 TABLET ORAL DAILY
Qty: 0 | Refills: 0 | Status: DISCONTINUED | OUTPATIENT
Start: 2018-04-26 | End: 2018-04-27

## 2018-04-26 RX ORDER — ACETAMINOPHEN 500 MG
650 TABLET ORAL EVERY 6 HOURS
Qty: 0 | Refills: 0 | Status: DISCONTINUED | OUTPATIENT
Start: 2018-04-26 | End: 2018-05-25

## 2018-04-26 RX ORDER — POTASSIUM PHOSPHATE, MONOBASIC POTASSIUM PHOSPHATE, DIBASIC 236; 224 MG/ML; MG/ML
30 INJECTION, SOLUTION INTRAVENOUS ONCE
Qty: 0 | Refills: 0 | Status: COMPLETED | OUTPATIENT
Start: 2018-04-26 | End: 2018-04-26

## 2018-04-26 RX ADMIN — Medication 650 MILLIGRAM(S): at 08:00

## 2018-04-26 RX ADMIN — Medication 100 MILLIGRAM(S): at 12:26

## 2018-04-26 RX ADMIN — CHLORHEXIDINE GLUCONATE 15 MILLILITER(S): 213 SOLUTION TOPICAL at 06:04

## 2018-04-26 RX ADMIN — NICARDIPINE HYDROCHLORIDE 25 MG/HR: 30 CAPSULE, EXTENDED RELEASE ORAL at 18:30

## 2018-04-26 RX ADMIN — Medication 100 MILLIGRAM(S): at 06:04

## 2018-04-26 RX ADMIN — Medication 20 MILLIGRAM(S): at 18:05

## 2018-04-26 RX ADMIN — Medication 1 MILLIGRAM(S): at 12:26

## 2018-04-26 RX ADMIN — Medication 1 APPLICATION(S): at 18:09

## 2018-04-26 RX ADMIN — CHLORHEXIDINE GLUCONATE 15 MILLILITER(S): 213 SOLUTION TOPICAL at 18:09

## 2018-04-26 RX ADMIN — AMLODIPINE BESYLATE 5 MILLIGRAM(S): 2.5 TABLET ORAL at 06:05

## 2018-04-26 RX ADMIN — LACTULOSE 20 GRAM(S): 10 SOLUTION ORAL at 11:00

## 2018-04-26 RX ADMIN — PANTOPRAZOLE SODIUM 40 MILLIGRAM(S): 20 TABLET, DELAYED RELEASE ORAL at 12:26

## 2018-04-26 RX ADMIN — Medication 1 APPLICATION(S): at 12:26

## 2018-04-26 RX ADMIN — POTASSIUM PHOSPHATE, MONOBASIC POTASSIUM PHOSPHATE, DIBASIC 83.33 MILLIMOLE(S): 236; 224 INJECTION, SOLUTION INTRAVENOUS at 12:25

## 2018-04-26 RX ADMIN — Medication 1 APPLICATION(S): at 22:39

## 2018-04-26 RX ADMIN — LACTULOSE 20 GRAM(S): 10 SOLUTION ORAL at 17:00

## 2018-04-26 NOTE — CONSULT NOTE ADULT - SUBJECTIVE AND OBJECTIVE BOX
[facility]                                     [facility] [place]                                 Rima Whiteside, & Saad                                  [address]                                        [place], [place], [address]                                             [phone]    HISTORY:  HPI:  The patient is a 63y Female who c/o dizziness and then collapsed at home.  She had poor mental status and was intubated in the ED.  She was found to have large brainstem bleed on CT.  Her HTN was controlled with nicardipine drip.  She is now in MICU, with eyes open and roving vertical eye movements and unresponsive.  Neuro eval was requested.    PAST MEDICAL & SURGICAL HISTORY:  Alcohol abuse  No significant past surgical history      MEDICATIONS  (STANDING):  amLODIPine   Tablet 5 milliGRAM(s) Oral daily  chlorhexidine 0.12% Liquid 15 milliLiter(s) Swish and Spit two times a day  folic acid 1 milliGRAM(s) Oral daily  niCARdipine Infusion 5 mG/Hr (25 mL/Hr) IV Continuous <Continuous>  pantoprazole  Injectable 40 milliGRAM(s) IV Push daily  petrolatum Ophthalmic Ointment 1 Application(s) Both EYES every 6 hours  senna 2 Tablet(s) Oral at bedtime  thiamine 100 milliGRAM(s) Oral daily    MEDICATIONS  (PRN):  acetaminophen    Suspension 650 milliGRAM(s) Oral every 6 hours PRN For Temp greater than 38 C (100.4 F)  hydrALAZINE Injectable 20 milliGRAM(s) IV Push every 6 hours PRN systolic > 150  labetalol Injectable 20 milliGRAM(s) IV Push every 2 hours PRN systolic > 150  lactulose Syrup 20 Gram(s) Oral every 6 hours PRN Constpation  polyethylene glycol 3350 17 Gram(s) Oral daily PRN Constipation      Allergies    No Known Allergies    Intolerances    unknown    SOCIAL HISTORY:  no tob (+) alcohol  no drugs    FAMILY HISTORY:  No pertinent family history in first degree relatives      ROS:  unobtainable to due condition    Vital Signs Last 24 Hrs  T(C): 37.5 (26 Apr 2018 13:00), Max: 39.2 (25 Apr 2018 18:00)  T(F): 99.5 (26 Apr 2018 13:00), Max: 102.6 (25 Apr 2018 18:00)  HR: 97 (26 Apr 2018 13:00) (69 - 116)  BP: 135/72 (26 Apr 2018 13:00) (105/60 - 192/85)  BP(mean): 98 (26 Apr 2018 13:00) (76 - 126)  RR: 28 (26 Apr 2018 13:00) (18 - 34)  SpO2: 98% (26 Apr 2018 13:00) (92% - 100%)      General: NAD    Detailed Neurologic Exam:    Mental status: The patient has eyes open with roving up and dowen eye movements  Unresponsive to voice, touch or noxious stimuli    Cranial nerves: . pinpoint NR pupils (pontine), absent corneal, spontaneous vertical eye movements only, (+) gag unable to assess facial sensation, hearing, palate elevation shoulder shrug and tongue extension    Motor: There is b/l decorticate posturing to noxious stimuli in BUE, triple flexion to plantar stimulation BLE    Sensation: no grimacer, only posturing to pinch    Reflexes: silent and plantar responses are extensor.    Cerebellar: Unable to test dysmetria on finger to nose testing.    Gait : deferred    LABS:                         13.2   12.6  )-----------( 192      ( 26 Apr 2018 06:17 )             41.2       04-26    143  |  102  |  13.0  ----------------------------<  200<H>  3.7   |  28.0  |  0.54    Ca    9.3      26 Apr 2018 09:33  Phos  1.6     04-26  Mg     2.2     04-26            RADIOLOGY & ADDITIONAL STUDIES (independently reviewed unless otherwise noted):  head CT shows large pontine into medulla ICH, no ischemic CVA, (+) SVID no mass

## 2018-04-26 NOTE — CONSULT NOTE ADULT - ASSESSMENT
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

## 2018-04-26 NOTE — PROGRESS NOTE ADULT - PROBLEM SELECTOR PLAN 1
Appears stable on repeat CT   Continue BP control, back on Cardene titrating for goal SBP <150  Attempt to titrate off Cardene, on PO antihypertensives with Labetalol and Hydralazine PRNs  Continue serial q1h neuro checks  STAT CT head for acute decline in neurologic exam  HOB > 30 deg  No neurosurgical intervention to be of benefit  Overall prognosis for meaningful recovery poor  Palliative Care following

## 2018-04-26 NOTE — PROGRESS NOTE ADULT - ASSESSMENT
63 yof with ICH, acute respiratory failure    CCT 50 min    Had discussion with  - need to discuss possiblity of trach/peg, nursing home if no further improvement verse comfort measures plan family meeting in am 63 yof with ICH, acute respiratory failure      Palliative care following - trying to plan family meeting with   Appreciate neurology recommendations

## 2018-04-26 NOTE — PROGRESS NOTE ADULT - SUBJECTIVE AND OBJECTIVE BOX
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 No nausea, vomiting, or hematemesis; No diarrhea or constipation. No melena or hematochezia.  GENITOURINARY: No dysuria, frequency, hematuria, or incontinence  NEUROLOGICAL: No headaches, memory loss, loss of strength, numbness, or tremors  SKIN: No itching, burning, rashes, or lesions   MUSCULOSKELETAL: No joint pain or swelling; No muscle, back, or extremity pain  PSYCHIATRIC: No depression, anxiety, mood swings, or difficulty sleeping      Medications:    amLODIPine   Tablet 5 milliGRAM(s) Oral daily  hydrALAZINE Injectable 20 milliGRAM(s) IV Push every 6 hours PRN  labetalol Injectable 20 milliGRAM(s) IV Push every 2 hours PRN  niCARdipine Infusion 5 mG/Hr IV Continuous <Continuous>      acetaminophen    Suspension 650 milliGRAM(s) Oral every 6 hours PRN        lactulose Syrup 20 Gram(s) Oral every 12 hours  pantoprazole  Injectable 40 milliGRAM(s) IV Push daily  polyethylene glycol 3350 17 Gram(s) Oral daily PRN  senna 2 Tablet(s) Oral at bedtime        folic acid 1 milliGRAM(s) Oral daily  thiamine 100 milliGRAM(s) Oral daily      chlorhexidine 0.12% Liquid 15 milliLiter(s) Swish and Spit two times a day  petrolatum Ophthalmic Ointment 1 Application(s) Both EYES every 6 hours        Mode: CPAP with PS  FiO2: 21  PEEP: 5  PS: 10      ICU Vital Signs Last 24 Hrs  T(C): 38.4 (26 Apr 2018 08:00), Max: 39.2 (25 Apr 2018 18:00)  T(F): 101.1 (26 Apr 2018 08:00), Max: 102.6 (25 Apr 2018 18:00)  HR: 92 (26 Apr 2018 08:00) (69 - 116)  BP: 127/66 (26 Apr 2018 08:00) (105/60 - 192/85)  BP(mean): 89 (26 Apr 2018 08:00) (76 - 126)  ABP: --  ABP(mean): --  RR: 34 (26 Apr 2018 08:00) (18 - 202)  SpO2: 95% (26 Apr 2018 08:00) (95% - 100%)      ABG - ( 25 Apr 2018 21:09 )  pH, Arterial: 7.44  pH, Blood: x     /  pCO2: 44    /  pO2: 66    / HCO3: 29    / Base Excess: 4.7   /  SaO2: 94                  I&O's Detail    25 Apr 2018 07:01  -  26 Apr 2018 07:00  --------------------------------------------------------  IN:    Enteral Tube Flush: 180 mL    Jevity: 720 mL    niCARdipine Infusion: 962.5 mL  Total IN: 1862.5 mL    OUT:    Indwelling Catheter - Urethral: 85 mL    Intermittent Catheterization - Urethral: 650 mL  Total OUT: 735 mL    Total NET: 1127.5 mL      26 Apr 2018 07:01  -  26 Apr 2018 08:25  --------------------------------------------------------  IN:    niCARdipine Infusion: 50 mL  Total IN: 50 mL    OUT:  Total OUT: 0 mL    Total NET: 50 mL            LABS:                        13.2   12.6  )-----------( 192      ( 26 Apr 2018 06:17 )             41.2     04-26    145  |  104  |  12.0  ----------------------------<  163<H>  3.6   |  28.0  |  0.47<L>    Ca    9.8      26 Apr 2018 06:17  Phos  1.8     04-26  Mg     2.2     04-26            CAPILLARY BLOOD GLUCOSE            CULTURES:  Culture Results:   No growth at 48 hours (04-24-18 @ 01:07)  Culture Results:   No growth at 48 hours (04-24-18 @ 01:07)  Culture Results:   No growth (04-24-18 @ 01:06)      Physical Examination:    General: No acute distress.  Alert, oriented, interactive, nonfocal    HEENT: Pupils equal, reactive to light.  Symmetric.    PULM: Clear to auscultation bilaterally, no significant sputum production    CVS: Regular rate and rhythm, no murmurs, rubs, or gallops    ABD: Soft, nondistended, nontender, normoactive bowel sounds, no masses    EXT: No edema, nontender    SKIN: Warm and well perfused, no rashes noted.    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      Events last 24 hours: fever yesterday cultures sent cxr neg    PAST MEDICAL & SURGICAL HISTORY:  Alcohol abuse  No significant past surgical history      Review of Systems:  unable to obtain      Medications:    amLODIPine   Tablet 5 milliGRAM(s) Oral daily  hydrALAZINE Injectable 20 milliGRAM(s) IV Push every 6 hours PRN  labetalol Injectable 20 milliGRAM(s) IV Push every 2 hours PRN  niCARdipine Infusion 5 mG/Hr IV Continuous <Continuous>      acetaminophen    Suspension 650 milliGRAM(s) Oral every 6 hours PRN        lactulose Syrup 20 Gram(s) Oral every 12 hours  pantoprazole  Injectable 40 milliGRAM(s) IV Push daily  polyethylene glycol 3350 17 Gram(s) Oral daily PRN  senna 2 Tablet(s) Oral at bedtime        folic acid 1 milliGRAM(s) Oral daily  thiamine 100 milliGRAM(s) Oral daily      chlorhexidine 0.12% Liquid 15 milliLiter(s) Swish and Spit two times a day  petrolatum Ophthalmic Ointment 1 Application(s) Both EYES every 6 hours        Mode: CPAP with PS  FiO2: 21  PEEP: 5  PS: 10      ICU Vital Signs Last 24 Hrs  T(C): 38.4 (26 Apr 2018 08:00), Max: 39.2 (25 Apr 2018 18:00)  T(F): 101.1 (26 Apr 2018 08:00), Max: 102.6 (25 Apr 2018 18:00)  HR: 92 (26 Apr 2018 08:00) (69 - 116)  BP: 127/66 (26 Apr 2018 08:00) (105/60 - 192/85)  BP(mean): 89 (26 Apr 2018 08:00) (76 - 126)  ABP: --  ABP(mean): --  RR: 34 (26 Apr 2018 08:00) (18 - 202)  SpO2: 95% (26 Apr 2018 08:00) (95% - 100%)      ABG - ( 25 Apr 2018 21:09 )  pH, Arterial: 7.44  pH, Blood: x     /  pCO2: 44    /  pO2: 66    / HCO3: 29    / Base Excess: 4.7   /  SaO2: 94                  I&O's Detail    25 Apr 2018 07:01  -  26 Apr 2018 07:00  --------------------------------------------------------  IN:    Enteral Tube Flush: 180 mL    Jevity: 720 mL    niCARdipine Infusion: 962.5 mL  Total IN: 1862.5 mL    OUT:    Indwelling Catheter - Urethral: 85 mL    Intermittent Catheterization - Urethral: 650 mL  Total OUT: 735 mL    Total NET: 1127.5 mL      26 Apr 2018 07:01  -  26 Apr 2018 08:25  --------------------------------------------------------  IN:    niCARdipine Infusion: 50 mL  Total IN: 50 mL    OUT:  Total OUT: 0 mL    Total NET: 50 mL            LABS:                        13.2   12.6  )-----------( 192      ( 26 Apr 2018 06:17 )             41.2     04-26    145  |  104  |  12.0  ----------------------------<  163<H>  3.6   |  28.0  |  0.47<L>    Ca    9.8      26 Apr 2018 06:17  Phos  1.8     04-26  Mg     2.2     04-26            CAPILLARY BLOOD GLUCOSE            CULTURES:  Culture Results:   No growth at 48 hours (04-24-18 @ 01:07)  Culture Results:   No growth at 48 hours (04-24-18 @ 01:07)  Culture Results:   No growth (04-24-18 @ 01:06)      Physical Examination:    General: No acute distress.      HEENT: Pupils equal, reactive to light.  Symmetric.    PULM: Clear to auscultation bilaterally, no significant sputum production    CVS: Regular rate and rhythm, no murmurs, rubs, or gallops    ABD: Soft, nondistended, nontender, normoactive bowel sounds, no masses    EXT: No edema, nontender    SKIN: Warm and well perfused, no rashes noted.    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      Events last 24 hours: fever yesterday cultures sent cxr neg    PAST MEDICAL & SURGICAL HISTORY:  Alcohol abuse  No significant past surgical history      Review of Systems:  unable to obtain      Medications:    amLODIPine   Tablet 5 milliGRAM(s) Oral daily  hydrALAZINE Injectable 20 milliGRAM(s) IV Push every 6 hours PRN  labetalol Injectable 20 milliGRAM(s) IV Push every 2 hours PRN  niCARdipine Infusion 5 mG/Hr IV Continuous <Continuous>      acetaminophen    Suspension 650 milliGRAM(s) Oral every 6 hours PRN        lactulose Syrup 20 Gram(s) Oral every 12 hours  pantoprazole  Injectable 40 milliGRAM(s) IV Push daily  polyethylene glycol 3350 17 Gram(s) Oral daily PRN  senna 2 Tablet(s) Oral at bedtime        folic acid 1 milliGRAM(s) Oral daily  thiamine 100 milliGRAM(s) Oral daily      chlorhexidine 0.12% Liquid 15 milliLiter(s) Swish and Spit two times a day  petrolatum Ophthalmic Ointment 1 Application(s) Both EYES every 6 hours        Mode: CPAP with PS  FiO2: 21  PEEP: 5  PS: 10      ICU Vital Signs Last 24 Hrs  T(C): 38.4 (26 Apr 2018 08:00), Max: 39.2 (25 Apr 2018 18:00)  T(F): 101.1 (26 Apr 2018 08:00), Max: 102.6 (25 Apr 2018 18:00)  HR: 92 (26 Apr 2018 08:00) (69 - 116)  BP: 127/66 (26 Apr 2018 08:00) (105/60 - 192/85)  BP(mean): 89 (26 Apr 2018 08:00) (76 - 126)  ABP: --  ABP(mean): --  RR: 34 (26 Apr 2018 08:00) (18 - 202)  SpO2: 95% (26 Apr 2018 08:00) (95% - 100%)      ABG - ( 25 Apr 2018 21:09 )  pH, Arterial: 7.44  pH, Blood: x     /  pCO2: 44    /  pO2: 66    / HCO3: 29    / Base Excess: 4.7   /  SaO2: 94                  I&O's Detail    25 Apr 2018 07:01  -  26 Apr 2018 07:00  --------------------------------------------------------  IN:    Enteral Tube Flush: 180 mL    Jevity: 720 mL    niCARdipine Infusion: 962.5 mL  Total IN: 1862.5 mL    OUT:    Indwelling Catheter - Urethral: 85 mL    Intermittent Catheterization - Urethral: 650 mL  Total OUT: 735 mL    Total NET: 1127.5 mL      26 Apr 2018 07:01  -  26 Apr 2018 08:25  --------------------------------------------------------  IN:    niCARdipine Infusion: 50 mL  Total IN: 50 mL    OUT:  Total OUT: 0 mL    Total NET: 50 mL            LABS:                        13.2   12.6  )-----------( 192      ( 26 Apr 2018 06:17 )             41.2     04-26    145  |  104  |  12.0  ----------------------------<  163<H>  3.6   |  28.0  |  0.47<L>    Ca    9.8      26 Apr 2018 06:17  Phos  1.8     04-26  Mg     2.2     04-26            CAPILLARY BLOOD GLUCOSE            CULTURES:  Culture Results:   No growth at 48 hours (04-24-18 @ 01:07)  Culture Results:   No growth at 48 hours (04-24-18 @ 01:07)  Culture Results:   No growth (04-24-18 @ 01:06)      Physical Examination:    General: No acute distress.      HEENT: Pupils equal, reactive to light.  Symmetric.    PULM: Clear to auscultation bilaterally, no significant sputum production    CVS: Regular rate and rhythm, no murmurs, rubs, or gallops    ABD: Soft, nondistended, nontender, normoactive bowel sounds, no masses    EXT: No edema, nontender    SKIN: Warm and well perfused, no rashes noted.    NEURO: opens eyes not consistant in following commands, withdraws to pain        CRITICAL CARE TIME SPENT: 40 mins

## 2018-04-26 NOTE — PROGRESS NOTE ADULT - PROBLEM SELECTOR PLAN 3
Related to encephalopathy and pontine hemorrhage  Continue low tidal volume ventilatory support  Peridex oral care, Protonix and TF for GI ppx  Patient tolerated SBT today, however is not a candidate for extubation at this time due to poor mental status and inability to protect her airway  Will SBT in AM, discussed with RT.   Will need a plan for goals of care moving forward prior to trial of extubation with current mental status

## 2018-04-26 NOTE — PROGRESS NOTE ADULT - ASSESSMENT
62 yo female, PMHx EtOH abuse, admitted with encephalopathy and respiratory failure secondary to acute pontine hemorrhage, hypertensive emergency.

## 2018-04-26 NOTE — PROGRESS NOTE ADULT - SUBJECTIVE AND OBJECTIVE BOX
Patient is a 63y old  Female who presents with a chief complaint of complained of dizzyness and collapsed at home (23 Apr 2018 10:59)      BRIEF HOSPITAL COURSE: 62 yo female, PMHx EtOH abuse, admitted on 4/23/18 with dizziness and syncope. Upon arrival in the ED she was obtunded and intubated for airway support. Her BP was noted to be elevated. 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 There was no evidence of aneurysm or dissection noted on CTA. IV Cardene initiated for BP control. Poor neurological exam. Neurosurgery not recommending surgical intervention at this point.    Events last 24 hours: Patient remains intubated on full vent support. Cardene infusion restarted for uncontrolled hypertension. Neuro exam decompensated, patient with minimally reactive 2mm pupils, no spontaneous eye opening or to noxious stimuli, no gag, +overbreathing vent on CPAP, decerebrate posturing of bilateral UE, triple flexion of bilateral LE. Not on sedation. A STAT ABG was performed, unremarkable. Patient was taken for STAT CT head to r/o expansion of hemorrhage or new insult. By my gross evaluation, existing pontine bleed appears relatively stable from prior CT on 4/23 without clinically significant change, pending official interpretation. Patient's brother and niece, amongst other family members, were updated at length at bedside. Upon return from CT, patient had return of spontaneous eye opening and roving eye movements, does not track; spontaneous movements of head and extremities, however no purposeful movements or responses to stimuli, does not follow commands.    PAST MEDICAL & SURGICAL HISTORY:  Alcohol abuse  No significant past surgical history      Review of Systems: unable to obtain due to encephalopathy      Medications:  hydrALAZINE Injectable 20 milliGRAM(s) IV Push every 6 hours PRN  labetalol Injectable 20 milliGRAM(s) IV Push every 2 hours PRN  niCARdipine Infusion 5 mG/Hr IV Continuous <Continuous>  docusate sodium 100 milliGRAM(s) Oral two times a day  pantoprazole  Injectable 40 milliGRAM(s) IV Push daily  polyethylene glycol 3350 17 Gram(s) Oral daily PRN  senna 2 Tablet(s) Oral at bedtime  folic acid 1 milliGRAM(s) Oral daily  thiamine 100 milliGRAM(s) Oral daily  chlorhexidine 0.12% Liquid 15 milliLiter(s) Swish and Spit two times a day        Mode: AC/ CMV (Assist Control/ Continuous Mandatory Ventilation)  RR (machine): 20  TV (machine): 400  FiO2: 21  PEEP: 5  MAP: 10  PIP: 23      ICU Vital Signs Last 24 Hrs  T(C): 36.9 (26 Apr 2018 00:09), Max: 39.2 (25 Apr 2018 18:00)  T(F): 98.4 (26 Apr 2018 00:09), Max: 102.6 (25 Apr 2018 18:00)  HR: 76 (26 Apr 2018 00:13) (66 - 116)  BP: 131/73 (26 Apr 2018 00:00) (102/56 - 192/85)  BP(mean): 96 (26 Apr 2018 00:00) (72 - 128)  ABP: --  ABP(mean): --  RR: 21 (26 Apr 2018 00:00) (11 - 202)  SpO2: 96% (26 Apr 2018 00:13) (96% - 100%)      ABG - ( 25 Apr 2018 21:09 )  pH, Arterial: 7.44  pH /  pCO2: 44    /  pO2: 66    / HCO3: 29    / Base Excess: 4.7   /  SaO2: 94          I&O's Detail    24 Apr 2018 07:01  -  25 Apr 2018 07:00  --------------------------------------------------------  IN:    Jevity: 960 mL    niCARdipine Infusion: 305 mL    Oral Fluid: 300 mL    Solution: 100 mL  Total IN: 1665 mL    OUT:    Indwelling Catheter - Urethral: 325 mL    Voided: 385 mL  Total OUT: 710 mL    Total NET: 955 mL      25 Apr 2018 07:01  -  26 Apr 2018 00:20  --------------------------------------------------------  IN:    Enteral Tube Flush: 180 mL    Jevity: 720 mL    niCARdipine Infusion: 362.5 mL  Total IN: 1262.5 mL    OUT:    Indwelling Catheter - Urethral: 85 mL    Voided: 500 mL  Total OUT: 585 mL    Total NET: 677.5 mL      LABS:                        13.9   12.8  )-----------( 164      ( 25 Apr 2018 06:45 )             43.0     04-25    141  |  100  |  12.0  ----------------------------<  160<H>  3.8   |  28.0  |  0.55    Ca    9.8      25 Apr 2018 06:45  Phos  1.7     04-25  Mg     2.3     04-25      CAPILLARY BLOOD GLUCOSE      CULTURES:  Culture Results:   No growth (04-24 @ 01:06)      Physical Examination:    General: No acute distress.      HEENT: Pupils 2mm equal, reactive to light, sluggish. Symmetric.    PULM: Coarse to auscultation bilaterally, no significant sputum production    CVS: Regular rate and rhythm, no murmurs, rubs, or gallops    ABD: Soft, nondistended, nontender, normoactive bowel sounds, no masses    EXT: Generalized anasarca    SKIN: Warm and well perfused, no rashes noted.    NEURO: Minimally reactive 2mm pupils, no spontaneous eye opening or to noxious stimuli, no gag, +overbreathing vent on CPAP, decerebrate posturing of bilateral UE, triple flexion of bilateral LE.    RADIOLOGY: < from: CT Head No Cont (04.25.18 @ 23:03) >  ******PRELIMINARY REPORT******    ******PRELIMINARY REPORT******           EXAM:  CT BRAIN                          PROCEDURE DATE:  04/25/2018        ******PRELIMINARY REPORT******    ******PRELIMINARY REPORT******          INTERPRETATION:  Prelim:    Allowing for motion artifact, no significant change in extent and   distribution of dorsal pontine hemorrhage extending into the fourth   ventricle. No new site of bleeding or progressive mass effect.    Endotracheal tube is in place.    Follow-up final report.          ******PRELIMINARY REPORT******    ******PRELIMINARY REPORT******              BETTY URIOSTEGUI M.D., ATTENDING RADIOLOGIST    < end of copied text >      CRITICAL CARE TIME SPENT: I have spent 62 minutes of critical care time evaluating and treating this patient, including reviewing charts, labs, imagining studies, and collaborating with interdisciplinary team.

## 2018-04-26 NOTE — PROGRESS NOTE ADULT - PROBLEM SELECTOR PLAN 5
Patient persistently febrile, may be neurogenic fever  On cooling blanket with Tylenol PRN   CXR neg for PNA  Blood cultures pending  UA unremarkable  Continue to trend fever curve, observe off abx  Will start on abx if focus of infection presents

## 2018-04-26 NOTE — PROGRESS NOTE ADULT - PROBLEM SELECTOR PLAN 4
BP now controlled on Cardene, titrating for goal SBP < 150  Will start on oral Norvasc in AM and attempt to titrate off Cardene as tolerated

## 2018-04-27 DIAGNOSIS — J96.90 RESPIRATORY FAILURE, UNSPECIFIED, UNSPECIFIED WHETHER WITH HYPOXIA OR HYPERCAPNIA: ICD-10-CM

## 2018-04-27 DIAGNOSIS — I61.3 NONTRAUMATIC INTRACEREBRAL HEMORRHAGE IN BRAIN STEM: ICD-10-CM

## 2018-04-27 LAB
ANION GAP SERPL CALC-SCNC: 14 MMOL/L — SIGNIFICANT CHANGE UP (ref 5–17)
BUN SERPL-MCNC: 15 MG/DL — SIGNIFICANT CHANGE UP (ref 8–20)
CALCIUM SERPL-MCNC: 9.7 MG/DL — SIGNIFICANT CHANGE UP (ref 8.6–10.2)
CHLORIDE SERPL-SCNC: 105 MMOL/L — SIGNIFICANT CHANGE UP (ref 98–107)
CO2 SERPL-SCNC: 28 MMOL/L — SIGNIFICANT CHANGE UP (ref 22–29)
CREAT SERPL-MCNC: 0.55 MG/DL — SIGNIFICANT CHANGE UP (ref 0.5–1.3)
GLUCOSE SERPL-MCNC: 131 MG/DL — HIGH (ref 70–115)
HCT VFR BLD CALC: 37.9 % — SIGNIFICANT CHANGE UP (ref 37–47)
HGB BLD-MCNC: 12.4 G/DL — SIGNIFICANT CHANGE UP (ref 12–16)
MAGNESIUM SERPL-MCNC: 2.2 MG/DL — SIGNIFICANT CHANGE UP (ref 1.6–2.6)
MCHC RBC-ENTMCNC: 32 PG — HIGH (ref 27–31)
MCHC RBC-ENTMCNC: 32.7 G/DL — SIGNIFICANT CHANGE UP (ref 32–36)
MCV RBC AUTO: 97.7 FL — SIGNIFICANT CHANGE UP (ref 81–99)
PHOSPHATE SERPL-MCNC: 3.2 MG/DL — SIGNIFICANT CHANGE UP (ref 2.4–4.7)
PLATELET # BLD AUTO: 202 K/UL — SIGNIFICANT CHANGE UP (ref 150–400)
POTASSIUM SERPL-MCNC: 3.8 MMOL/L — SIGNIFICANT CHANGE UP (ref 3.5–5.3)
POTASSIUM SERPL-SCNC: 3.8 MMOL/L — SIGNIFICANT CHANGE UP (ref 3.5–5.3)
PROCALCITONIN SERPL-MCNC: 0.32 NG/ML — HIGH (ref 0–0.04)
RBC # BLD: 3.88 M/UL — LOW (ref 4.4–5.2)
RBC # FLD: 14.1 % — SIGNIFICANT CHANGE UP (ref 11–15.6)
SODIUM SERPL-SCNC: 147 MMOL/L — HIGH (ref 135–145)
WBC # BLD: 10.2 K/UL — SIGNIFICANT CHANGE UP (ref 4.8–10.8)
WBC # FLD AUTO: 10.2 K/UL — SIGNIFICANT CHANGE UP (ref 4.8–10.8)

## 2018-04-27 PROCEDURE — 99232 SBSQ HOSP IP/OBS MODERATE 35: CPT

## 2018-04-27 PROCEDURE — 99291 CRITICAL CARE FIRST HOUR: CPT

## 2018-04-27 PROCEDURE — 99233 SBSQ HOSP IP/OBS HIGH 50: CPT

## 2018-04-27 PROCEDURE — 93010 ELECTROCARDIOGRAM REPORT: CPT

## 2018-04-27 RX ORDER — AMLODIPINE BESYLATE 2.5 MG/1
10 TABLET ORAL DAILY
Qty: 0 | Refills: 0 | Status: DISCONTINUED | OUTPATIENT
Start: 2018-04-27 | End: 2018-05-25

## 2018-04-27 RX ADMIN — NICARDIPINE HYDROCHLORIDE 25 MG/HR: 30 CAPSULE, EXTENDED RELEASE ORAL at 17:48

## 2018-04-27 RX ADMIN — Medication 1 APPLICATION(S): at 12:28

## 2018-04-27 RX ADMIN — AMLODIPINE BESYLATE 10 MILLIGRAM(S): 2.5 TABLET ORAL at 17:48

## 2018-04-27 RX ADMIN — Medication 1 MILLIGRAM(S): at 12:28

## 2018-04-27 RX ADMIN — Medication 1 APPLICATION(S): at 05:31

## 2018-04-27 RX ADMIN — AMLODIPINE BESYLATE 5 MILLIGRAM(S): 2.5 TABLET ORAL at 05:31

## 2018-04-27 RX ADMIN — Medication 650 MILLIGRAM(S): at 12:28

## 2018-04-27 RX ADMIN — Medication 650 MILLIGRAM(S): at 05:30

## 2018-04-27 RX ADMIN — NICARDIPINE HYDROCHLORIDE 25 MG/HR: 30 CAPSULE, EXTENDED RELEASE ORAL at 23:15

## 2018-04-27 RX ADMIN — Medication 100 MILLIGRAM(S): at 12:28

## 2018-04-27 RX ADMIN — CHLORHEXIDINE GLUCONATE 15 MILLILITER(S): 213 SOLUTION TOPICAL at 05:29

## 2018-04-27 RX ADMIN — Medication 1 APPLICATION(S): at 17:48

## 2018-04-27 RX ADMIN — CHLORHEXIDINE GLUCONATE 15 MILLILITER(S): 213 SOLUTION TOPICAL at 17:48

## 2018-04-27 RX ADMIN — Medication 650 MILLIGRAM(S): at 21:04

## 2018-04-27 RX ADMIN — SENNA PLUS 2 TABLET(S): 8.6 TABLET ORAL at 21:04

## 2018-04-27 RX ADMIN — PANTOPRAZOLE SODIUM 40 MILLIGRAM(S): 20 TABLET, DELAYED RELEASE ORAL at 12:28

## 2018-04-27 RX ADMIN — Medication 1 APPLICATION(S): at 23:15

## 2018-04-27 RX ADMIN — NICARDIPINE HYDROCHLORIDE 25 MG/HR: 30 CAPSULE, EXTENDED RELEASE ORAL at 05:30

## 2018-04-27 NOTE — PROGRESS NOTE ADULT - PROBLEM SELECTOR PLAN 1
keep -150 sosa gtt  no neurosurgical intervention keep -150 sosa gtt  no neurosurgical intervention  labetolol start 100 BID, increase norvasc titrate off cardene  no neurosurgical intervention  labetolol start 100 BID, increase norvasc

## 2018-04-27 NOTE — PROGRESS NOTE ADULT - ASSESSMENT
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

## 2018-04-27 NOTE — PROGRESS NOTE ADULT - PROBLEM SELECTOR PLAN 2
- very poor prognosis overall for meaningful recovery. likely to be dependent with most ADLs for rest of her life.

## 2018-04-27 NOTE — PROGRESS NOTE ADULT - SUBJECTIVE AND OBJECTIVE BOX
[facility]                                     Neurology at [place]                                 Rima Whiteside, & Saad                                  [address]                                        [place], [place], [address]                                             [phone]    HISTORY:  HPI:  The patient is a 63y Female who c/o dizziness and then collapsed at home.  She had poor mental status and was intubated in the ED.  She was found to have large brainstem bleed on CT.  Her HTN was controlled with nicardipine drip.  She is now in MICU, with eyes open and roving vertical eye movements and unresponsive.  Neuro eval was requested.    Interval history: essentially no change in exam      MEDICATIONS  (STANDING):  amLODIPine   Tablet 10 milliGRAM(s) Oral daily  chlorhexidine 0.12% Liquid 15 milliLiter(s) Swish and Spit two times a day  folic acid 1 milliGRAM(s) Oral daily  niCARdipine Infusion 5 mG/Hr (25 mL/Hr) IV Continuous <Continuous>  pantoprazole  Injectable 40 milliGRAM(s) IV Push daily  petrolatum Ophthalmic Ointment 1 Application(s) Both EYES every 6 hours  senna 2 Tablet(s) Oral at bedtime  thiamine 100 milliGRAM(s) Oral daily    MEDICATIONS  (PRN):  acetaminophen    Suspension 650 milliGRAM(s) Oral every 6 hours PRN For Temp greater than 38 C (100.4 F)  hydrALAZINE Injectable 20 milliGRAM(s) IV Push every 6 hours PRN systolic > 150  labetalol Injectable 20 milliGRAM(s) IV Push every 2 hours PRN systolic > 150  lactulose Syrup 20 Gram(s) Oral every 6 hours PRN Constpation  polyethylene glycol 3350 17 Gram(s) Oral daily PRN Constipation    polyethylene glycol 3350 17 Gram(s) Oral daily PRN Constipation    ROS:  unobtainable to due condition    Vital Signs Last 24 Hrs  T(C): 38.8 (27 Apr 2018 11:00), Max: 39.2 (27 Apr 2018 04:00)  T(F): 101.8 (27 Apr 2018 11:00), Max: 102.6 (27 Apr 2018 04:00)  HR: 97 (27 Apr 2018 15:00) (80 - 116)  BP: 134/73 (27 Apr 2018 14:00) (113/58 - 198/99)  BP(mean): 98 (27 Apr 2018 14:00) (78 - 128)  RR: 29 (27 Apr 2018 15:00) (25 - 37)  SpO2: 98% (27 Apr 2018 15:00) (94% - 100%)    General: NAD    Detailed Neurologic Exam:    Mental status: The patient has eyes open with roving up and down eye movements  Unresponsive to voice, touch or noxious stimuli    Cranial nerves: . pinpoint NR pupils (pontine), absent corneal, spontaneous vertical eye movements only, (+) gag. unable to assess facial sensation, hearing, palate elevation shoulder shrug and tongue extension    Motor: There is b/l decorticate posturing to noxious stimuli in BUE, triple flexion to plantar stimulation BLE,     Sensation: no grimaces, only posturing to pinch b/l    Reflexes: silent and plantar responses are extensor., (+) grasp reflex    Cerebellar: Unable to test dysmetria on finger to nose testing.    Gait : deferred    LABS:                                    12.4   10.2  )-----------( 202      ( 27 Apr 2018 05:54 )             37.9   04-27    147<H>  |  105  |  15.0  ----------------------------<  131<H>  3.8   |  28.0  |  0.55    Ca    9.7      27 Apr 2018 05:54  Phos  3.2     04-27  Mg     2.2     04-27    RADIOLOGY & ADDITIONAL STUDIES (independently reviewed unless otherwise noted):  4/25/18:  head CT shows large pontine into medulla ICH, no ischemic CVA, (+) SVID no mass

## 2018-04-27 NOTE — PROGRESS NOTE ADULT - PROBLEM SELECTOR PLAN 3
-sat with  Starke at length today. He is very emotional and tearful -sat with  Capo at length today. He is very emotional and tearful. He states again he wishes they had discussed these things. He sometimes feels like when he is talking to her that she is upset with him. I relayed to him that it is really hard to interpret if she is really understanding what is going on. I explained to him that the next step would be making a decision about tach/peg vs. withdrawal. I also assured him that whatever decision he makes that as long as he makes the decision  in her best interest and as if it was her making the decision, then it's the right decision. He called his family and we will all meet together today at 3pm.

## 2018-04-27 NOTE — PROGRESS NOTE ADULT - SUBJECTIVE AND OBJECTIVE BOX
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 Poor mentla status, intubated . Required cardene drip for BP control. Non-surgical candidate per neurosurg.     Events last 24 hours:   -remains intubated, full vent support, poor mental status  -family meeting with palliative care during day shift, family not ready for decision yet  -live on [place] consulted    PAST MEDICAL & SURGICAL HISTORY:  Alcohol abuse  No significant past surgical history      Review of Systems:  N/A, intubated, pontine hemmorhage    Medications:    amLODIPine   Tablet 10 milliGRAM(s) Oral daily  hydrALAZINE Injectable 20 milliGRAM(s) IV Push every 6 hours PRN  labetalol Injectable 20 milliGRAM(s) IV Push every 2 hours PRN  niCARdipine Infusion 5 mG/Hr IV Continuous <Continuous>      acetaminophen    Suspension 650 milliGRAM(s) Oral every 6 hours PRN        lactulose Syrup 20 Gram(s) Oral every 6 hours PRN  pantoprazole  Injectable 40 milliGRAM(s) IV Push daily  polyethylene glycol 3350 17 Gram(s) Oral daily PRN  senna 2 Tablet(s) Oral at bedtime        folic acid 1 milliGRAM(s) Oral daily  thiamine 100 milliGRAM(s) Oral daily      chlorhexidine 0.12% Liquid 15 milliLiter(s) Swish and Spit two times a day  petrolatum Ophthalmic Ointment 1 Application(s) Both EYES every 6 hours        Mode: AC/ CMV (Assist Control/ Continuous Mandatory Ventilation)  RR (machine): 20  TV (machine): 400  FiO2: 21  PEEP: 5  MAP: 10  PIP: 19      ICU Vital Signs Last 24 Hrs  T(C): 39.4 (2018 20:00), Max: 39.4 (2018 20:00)  T(F): 103 (2018 20:00), Max: 103 (2018 20:00)  HR: 100 (2018 21:00) (80 - 116)  BP: 129/68 (2018 21:00) (113/58 - 170/91)  BP(mean): 92 (2018 21:00) (78 - 122)  ABP: --  ABP(mean): --  RR: 30 (2018 21:00) (25 - 37)  SpO2: 97% (2018 21:00) (94% - 99%)          I&O's Detail    2018 07:01  -  2018 07:00  --------------------------------------------------------  IN:    Enteral Tube Flush: 30 mL    Jevity: 660 mL    niCARdipine Infusion: 1180 mL    Solution: 499.8 mL  Total IN: 2369.8 mL    OUT:    Indwelling Catheter - Urethral: 600 mL    Intermittent Catheterization - Urethral: 300 mL  Total OUT: 900 mL    Total NET: 1469.8 mL      2018 07:01  -  2018 23:44  --------------------------------------------------------  IN:    Jevity: 420 mL    niCARdipine Infusion: 600 mL  Total IN: 1020 mL    OUT:    Indwelling Catheter - Urethral: 310 mL  Total OUT: 310 mL    Total NET: 710 mL            LABS:                        12.4   10.2  )-----------( 202      ( 2018 05:54 )             37.9     04-    147<H>  |  105  |  15.0  ----------------------------<  131<H>  3.8   |  28.0  |  0.55    Ca    9.7      2018 05:54  Phos  3.2     -  Mg     2.2     -27            CAPILLARY BLOOD GLUCOSE          Urinalysis Basic - ( 2018 09:34 )    Color: Yellow / Appearance: Clear / S.015 / pH: x  Gluc: x / Ketone: Negative  / Bili: Negative / Urobili: 1 mg/dL   Blood: x / Protein: 30 mg/dL / Nitrite: Negative   Leuk Esterase: Trace / RBC: 0-2 /HPF / WBC 10-15 /HPF   Sq Epi: x / Non Sq Epi: x / Bacteria: x      CULTURES:  Culture Results:   No growth at 48 hours (18 @ 17:45)  Culture Results:   No growth at 48 hours (18 @ 17:44)  Culture Results:   No growth at 48 hours (18 @ 01:07)  Culture Results:   No growth at 48 hours (18 @ 01:07)  Culture Results:   No growth (18 @ 01:06)      Physical Examination:     General: Intubated, non-responsive, some posturing noted    HEENT: Pupils non-reactive to light    PULM: Clear to auscultation bilaterally, no significant sputum production    CVS: Regular rate and rhythm, no murmurs, rubs, or gallops    ABD: Soft, nondistended, nontender, normoactive bowel sounds, no masses    EXT: No edema, nontender    SKIN: Warm and well perfused, no rashes noted.    RADIOLOGY:     < from: CT Head No Cont (18 @ 23:03) >  MPRESSION:       No significant change in extent and distribution of dorsal pontine   hemorrhage .    < end of copied text >      CRITICAL CARE TIME SPENT: 35 minutes spent evaluating/treating patient, reviewing labs/imaging, and discussing care with bedside team/family

## 2018-04-27 NOTE — PROGRESS NOTE ADULT - ASSESSMENT
63 yof with ICH, acute respiratory failure      Palliative care following - trying to plan family meeting with   Appreciate neurology recommendations 63 yof with ICH, acute respiratory failure      Palliative care following - family meet 4/27 discussed trach and peg family will need the weekend to think about this - will revisit monday  Appreciate neurology recommendations

## 2018-04-27 NOTE — PROGRESS NOTE ADULT - ASSESSMENT
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 Poor mentla status, intubated . Required cardene drip for BP control. Non-surgical candidate per neurosurg.     Events last 24 hours:   -remains intubated, full vent support, poor mental status  -family meeting with palliative care during day shift, family not ready for decision yet  -live on [place] consulted

## 2018-04-27 NOTE — PROGRESS NOTE ADULT - PROBLEM SELECTOR PLAN 1
-repeat imaging shows no improvement or worsening of pontine hemorrhage  -remains with poor mental status and exam  -will continue neuro exams  -BP control  -family at bedside, did meet with palliative care team today to discuss goals of care. Not ready to make decision at this time. WIll need to continue discussions of possiblr trach/PEG versus, comfort type care if status does not improve (which is unlikely given bleed, and neurology folowing).

## 2018-04-27 NOTE — PROGRESS NOTE ADULT - SUBJECTIVE AND OBJECTIVE BOX
Patient is a 63y old  Female who presents with a chief complaint of complained of dizzyness and collapsed at home (2018 10:59)      BRIEF HOSPITAL COURSE: 63 yof with pontine hemorrhage    Events last 24 hours: no acute issues    PAST MEDICAL & SURGICAL HISTORY:  Alcohol abuse  No significant past surgical history      Review of Systems:  unable to obtain      Medications:    amLODIPine   Tablet 5 milliGRAM(s) Oral daily  hydrALAZINE Injectable 20 milliGRAM(s) IV Push every 6 hours PRN  labetalol Injectable 20 milliGRAM(s) IV Push every 2 hours PRN  niCARdipine Infusion 5 mG/Hr IV Continuous <Continuous>      acetaminophen    Suspension 650 milliGRAM(s) Oral every 6 hours PRN        lactulose Syrup 20 Gram(s) Oral every 6 hours PRN  pantoprazole  Injectable 40 milliGRAM(s) IV Push daily  polyethylene glycol 3350 17 Gram(s) Oral daily PRN  senna 2 Tablet(s) Oral at bedtime        folic acid 1 milliGRAM(s) Oral daily  thiamine 100 milliGRAM(s) Oral daily      chlorhexidine 0.12% Liquid 15 milliLiter(s) Swish and Spit two times a day  petrolatum Ophthalmic Ointment 1 Application(s) Both EYES every 6 hours        Mode: CPAP with PS  FiO2: 21  PEEP: 5  PS: 10  MAP: 9      ICU Vital Signs Last 24 Hrs  T(C): 39.2 (2018 04:00), Max: 39.2 (2018 04:00)  T(F): 102.6 (2018 04:00), Max: 102.6 (2018 04:00)  HR: 93 (2018 08:47) (80 - 116)  BP: 146/70 (2018 07:00) (113/58 - 198/99)  BP(mean): 101 (2018 07:00) (78 - 128)  ABP: --  ABP(mean): --  RR: 35 (2018 07:00) (25 - 37)  SpO2: 97% (2018 08:47) (92% - 100%)      ABG - ( 2018 21:09 )  pH, Arterial: 7.44  pH, Blood: x     /  pCO2: 44    /  pO2: 66    / HCO3: 29    / Base Excess: 4.7   /  SaO2: 94                  I&O's Detail    2018 07:01  -  2018 07:00  --------------------------------------------------------  IN:    Enteral Tube Flush: 30 mL    Jevity: 660 mL    niCARdipine Infusion: 1180 mL    Solution: 499.8 mL  Total IN: 2369.8 mL    OUT:    Indwelling Catheter - Urethral: 600 mL    Intermittent Catheterization - Urethral: 300 mL  Total OUT: 900 mL    Total NET: 1469.8 mL      2018 07:  -  2018 08:50  --------------------------------------------------------  IN:    niCARdipine Infusion: 50 mL  Total IN: 50 mL    OUT:  Total OUT: 0 mL    Total NET: 50 mL      LABS:                        12.4   10.2  )-----------( 202      ( 2018 05:54 )             37.9     04-27    147<H>  |  105  |  15.0  ----------------------------<  131<H>  3.8   |  28.0  |  0.55    Ca    9.7      2018 05:54  Phos  3.2     -  Mg     2.2           CAPILLARY BLOOD GLUCOSE      Urinalysis Basic - ( 2018 09:34 )    Color: Yellow / Appearance: Clear / S.015 / pH: x  Gluc: x / Ketone: Negative  / Bili: Negative / Urobili: 1 mg/dL   Blood: x / Protein: 30 mg/dL / Nitrite: Negative   Leuk Esterase: Trace / RBC: 0-2 /HPF / WBC 10-15 /HPF   Sq Epi: x / Non Sq Epi: x / Bacteria: x      CULTURES:  Culture Results:   No growth at 48 hours (18 @ 01:07)  Culture Results:   No growth at 48 hours (18 @ 01:07)  Culture Results:   No growth (18 @ 01:06)      Physical Examination:    General: No acute distress.  no sedation    HEENT: Pupils equal, reactive to light.  Symmetric.    PULM: Clear to auscultation bilaterally, no significant sputum production    CVS: Regular rate and rhythm, no murmurs, rubs, or gallops    ABD: Soft, nondistended, nontender, normoactive bowel sounds, no masses    EXT: No edema, nontender    SKIN: Warm and well perfused, no rashes noted.    NEURO: CPAPs on ventilator, blinks eyes, pupils reactive, unclear if following commands     RADIOLOGY:     CRITICAL CARE TIME SPENT: Patient is a 63y old  Female who presents with a chief complaint of complained of dizzyness and collapsed at home (2018 10:59)      BRIEF HOSPITAL COURSE: 63 yof with pontine hemorrhage    Events last 24 hours: no acute issues    PAST MEDICAL & SURGICAL HISTORY:  Alcohol abuse  No significant past surgical history      Review of Systems:  unable to obtain      Medications:    amLODIPine   Tablet 5 milliGRAM(s) Oral daily  hydrALAZINE Injectable 20 milliGRAM(s) IV Push every 6 hours PRN  labetalol Injectable 20 milliGRAM(s) IV Push every 2 hours PRN  niCARdipine Infusion 5 mG/Hr IV Continuous <Continuous>      acetaminophen    Suspension 650 milliGRAM(s) Oral every 6 hours PRN        lactulose Syrup 20 Gram(s) Oral every 6 hours PRN  pantoprazole  Injectable 40 milliGRAM(s) IV Push daily  polyethylene glycol 3350 17 Gram(s) Oral daily PRN  senna 2 Tablet(s) Oral at bedtime        folic acid 1 milliGRAM(s) Oral daily  thiamine 100 milliGRAM(s) Oral daily      chlorhexidine 0.12% Liquid 15 milliLiter(s) Swish and Spit two times a day  petrolatum Ophthalmic Ointment 1 Application(s) Both EYES every 6 hours        Mode: CPAP with PS  FiO2: 21  PEEP: 5  PS: 10  MAP: 9      ICU Vital Signs Last 24 Hrs  T(C): 39.2 (2018 04:00), Max: 39.2 (2018 04:00)  T(F): 102.6 (2018 04:00), Max: 102.6 (2018 04:00)  HR: 93 (2018 08:47) (80 - 116)  BP: 146/70 (2018 07:00) (113/58 - 198/99)  BP(mean): 101 (2018 07:00) (78 - 128)  ABP: --  ABP(mean): --  RR: 35 (2018 07:00) (25 - 37)  SpO2: 97% (2018 08:47) (92% - 100%)      ABG - ( 2018 21:09 )  pH, Arterial: 7.44  pH, Blood: x     /  pCO2: 44    /  pO2: 66    / HCO3: 29    / Base Excess: 4.7   /  SaO2: 94                  I&O's Detail    2018 07:01  -  2018 07:00  --------------------------------------------------------  IN:    Enteral Tube Flush: 30 mL    Jevity: 660 mL    niCARdipine Infusion: 1180 mL    Solution: 499.8 mL  Total IN: 2369.8 mL    OUT:    Indwelling Catheter - Urethral: 600 mL    Intermittent Catheterization - Urethral: 300 mL  Total OUT: 900 mL    Total NET: 1469.8 mL      2018 07:  -  2018 08:50  --------------------------------------------------------  IN:    niCARdipine Infusion: 50 mL  Total IN: 50 mL    OUT:  Total OUT: 0 mL    Total NET: 50 mL      LABS:                        12.4   10.2  )-----------( 202      ( 2018 05:54 )             37.9     04-27    147<H>  |  105  |  15.0  ----------------------------<  131<H>  3.8   |  28.0  |  0.55    Ca    9.7      2018 05:54  Phos  3.2     -  Mg     2.2           CAPILLARY BLOOD GLUCOSE      Urinalysis Basic - ( 2018 09:34 )    Color: Yellow / Appearance: Clear / S.015 / pH: x  Gluc: x / Ketone: Negative  / Bili: Negative / Urobili: 1 mg/dL   Blood: x / Protein: 30 mg/dL / Nitrite: Negative   Leuk Esterase: Trace / RBC: 0-2 /HPF / WBC 10-15 /HPF   Sq Epi: x / Non Sq Epi: x / Bacteria: x      CULTURES:  Culture Results:   No growth at 48 hours (18 @ 01:07)  Culture Results:   No growth at 48 hours (18 @ 01:07)  Culture Results:   No growth (18 @ 01:06)      Physical Examination:    General: No acute distress.  no sedation    HEENT: Pupils equal, reactive to light.  Symmetric.    PULM: Clear to auscultation bilaterally, no significant sputum production    CVS: Regular rate and rhythm, no murmurs, rubs, or gallops    ABD: Soft, nondistended, nontender, normoactive bowel sounds, no masses    EXT: No edema, nontender    SKIN: Warm and well perfused, no rashes noted.    NEURO: CPAPs on ventilator, blinks eyes, pupils reactive, unclear if following commands         CRITICAL CARE TIME SPENT:  50 min

## 2018-04-27 NOTE — PROGRESS NOTE ADULT - ATTENDING COMMENTS
Thank you for the opportunity to assist with the care of this patient.   [facility] [phone].

## 2018-04-27 NOTE — PROGRESS NOTE ADULT - PROBLEM SELECTOR PLAN 3
-currently on Cardene drip, will continue to titrate for SBP <140-160  -PO norvasc (NGT) added, and PRN labetalol as well. Will give PO meds with goal of coming off cardene drip. If needed increase norvasc dose or add additional PO anti-HTN meds to come off cardene

## 2018-04-27 NOTE — PROGRESS NOTE ADULT - SUBJECTIVE AND OBJECTIVE BOX
OVERNIGHT EVENTS: doing the same, no changes in vent settings, or neurological state.     Present Symptoms:     Dyspnea: 0 1 2 3   Nausea/Vomiting: Yes No  Anxiety:  Yes No  Depression: Yes No  Fatigue: Yes No  Loss of appetite: Yes No    Pain:             Character-            Duration-            Effect-            Factors-            Frequency-            Location-            Severity-    Review of Systems: Reviewed                     Negative:                     Positive:  Unable to obtain due to poor mentation   All others negative    MEDICATIONS  (STANDING):  amLODIPine   Tablet 10 milliGRAM(s) Oral daily  chlorhexidine 0.12% Liquid 15 milliLiter(s) Swish and Spit two times a day  folic acid 1 milliGRAM(s) Oral daily  niCARdipine Infusion 5 mG/Hr (25 mL/Hr) IV Continuous <Continuous>  pantoprazole  Injectable 40 milliGRAM(s) IV Push daily  petrolatum Ophthalmic Ointment 1 Application(s) Both EYES every 6 hours  senna 2 Tablet(s) Oral at bedtime  thiamine 100 milliGRAM(s) Oral daily    MEDICATIONS  (PRN):  acetaminophen    Suspension 650 milliGRAM(s) Oral every 6 hours PRN For Temp greater than 38 C (100.4 F)  hydrALAZINE Injectable 20 milliGRAM(s) IV Push every 6 hours PRN systolic > 150  labetalol Injectable 20 milliGRAM(s) IV Push every 2 hours PRN systolic > 150  lactulose Syrup 20 Gram(s) Oral every 6 hours PRN Constpation  polyethylene glycol 3350 17 Gram(s) Oral daily PRN Constipation    PHYSICAL EXAM:    Vital Signs Last 24 Hrs  T(C): 38.8 (2018 11:00), Max: 39.2 (2018 04:00)  T(F): 101.8 (2018 11:00), Max: 102.6 (2018 04:00)  HR: 96 (2018 12:09) (80 - 116)  BP: 120/63 (2018 11:00) (113/58 - 198/99)  BP(mean): 86 (2018 11:00) (78 - 128)  RR: 32 (2018 11:00) (25 - 37)  SpO2: 98% (2018 12:09) (94% - 100%)    General: alert  oriented x ____ lethargic agitated                  cachexia  nonverbal  coma    Karnofsky:  %    HEENT: normal  dry mouth  ET tube/trach    Lungs: comfortable tachypnea/labored breathing  excessive secretions    CV: normal  tachycardia    GI: normal  distended  tender  no BS               PEG/NG/OG tube  constipation  last BM:     : normal  incontinent  oliguria/anuria  bush    MSK: normal  weakness  edema             ambulatory  bedbound/wheelchair bound    Skin: normal  pressure ulcers- Stage_____  no rash    LABS:                      12.4   10.2  )-----------( 202      ( 2018 05:54 )             37.9         147<H>  |  105  |  15.0  ----------------------------<  131<H>  3.8   |  28.0  |  0.55    Ca    9.7      2018 05:54  Phos  3.2       Mg     2.2         Urinalysis Basic - ( 2018 09:34 )    Color: Yellow / Appearance: Clear / S.015 / pH: x  Gluc: x / Ketone: Negative  / Bili: Negative / Urobili: 1 mg/dL   Blood: x / Protein: 30 mg/dL / Nitrite: Negative   Leuk Esterase: Trace / RBC: 0-2 /HPF / WBC 10-15 /HPF   Sq Epi: x / Non Sq Epi: x / Bacteria: x    I&O's Summary    2018 07:  -  2018 07:00  --------------------------------------------------------  IN: 2369.8 mL / OUT: 900 mL / NET: 1469.8 mL    2018 07:  -  2018 13:29  --------------------------------------------------------  IN: 150 mL / OUT: 160 mL / NET: -10 mL    RADIOLOGY & ADDITIONAL STUDIES:    ADVANCE DIRECTIVES:   DNR YES NO  Completed on:                     MOLST  YES NO   Completed on:  Living Will  YES NO   Completed on: OVERNIGHT EVENTS: doing the same, no changes in vent settings, or neurological state.     Present Symptoms:     Dyspnea: vented   Nausea/Vomiting: No  Anxiety: unable   Depression: unable   Fatigue: unable   Loss of appetite: unable     Pain: none             Character-            Duration-            Effect-            Factors-            Frequency-            Location-            Severity-    Review of Systems: Reviewed                 Unable to obtain due to poor mentation   All others negative    MEDICATIONS  (STANDING):  amLODIPine   Tablet 10 milliGRAM(s) Oral daily  chlorhexidine 0.12% Liquid 15 milliLiter(s) Swish and Spit two times a day  folic acid 1 milliGRAM(s) Oral daily  niCARdipine Infusion 5 mG/Hr (25 mL/Hr) IV Continuous <Continuous>  pantoprazole  Injectable 40 milliGRAM(s) IV Push daily  petrolatum Ophthalmic Ointment 1 Application(s) Both EYES every 6 hours  senna 2 Tablet(s) Oral at bedtime  thiamine 100 milliGRAM(s) Oral daily    MEDICATIONS  (PRN):  acetaminophen    Suspension 650 milliGRAM(s) Oral every 6 hours PRN For Temp greater than 38 C (100.4 F)  hydrALAZINE Injectable 20 milliGRAM(s) IV Push every 6 hours PRN systolic > 150  labetalol Injectable 20 milliGRAM(s) IV Push every 2 hours PRN systolic > 150  lactulose Syrup 20 Gram(s) Oral every 6 hours PRN Constpation  polyethylene glycol 3350 17 Gram(s) Oral daily PRN Constipation    PHYSICAL EXAM:    Vital Signs Last 24 Hrs  T(C): 38.8 (2018 11:00), Max: 39.2 (2018 04:00)  T(F): 101.8 (2018 11:00), Max: 102.6 (2018 04:00)  HR: 96 (2018 12:09) (80 - 116)  BP: 120/63 (2018 11:00) (113/58 - 198/99)  BP(mean): 86 (2018 11:00) (78 - 128)  RR: 32 (2018 11:00) (25 - 37)  SpO2: 98% (2018 12:09) (94% - 100%)    General: alert, nods randomly, and maybe sometimes to command     Karnofsky:  20%    HEENT: ET tube    Lungs: comfortable     CV: normal      GI: normal     : bush    MSK: weakness      Skin: no rash    LABS:                      12.4   10.2  )-----------( 202      ( 2018 05:54 )             37.9         147<H>  |  105  |  15.0  ----------------------------<  131<H>  3.8   |  28.0  |  0.55    Ca    9.7      2018 05:54  Phos  3.2       Mg     2.2         Urinalysis Basic - ( 2018 09:34 )    Color: Yellow / Appearance: Clear / S.015 / pH: x  Gluc: x / Ketone: Negative  / Bili: Negative / Urobili: 1 mg/dL   Blood: x / Protein: 30 mg/dL / Nitrite: Negative   Leuk Esterase: Trace / RBC: 0-2 /HPF / WBC 10-15 /HPF   Sq Epi: x / Non Sq Epi: x / Bacteria: x    I&O's Summary    2018 07:  -  2018 07:00  --------------------------------------------------------  IN: 2369.8 mL / OUT: 900 mL / NET: 1469.8 mL    2018 07:  -  2018 13:29  --------------------------------------------------------  IN: 150 mL / OUT: 160 mL / NET: -10 mL    RADIOLOGY & ADDITIONAL STUDIES:    ADVANCE DIRECTIVES: full code

## 2018-04-28 DIAGNOSIS — E86.0 DEHYDRATION: ICD-10-CM

## 2018-04-28 DIAGNOSIS — I16.9 HYPERTENSIVE CRISIS, UNSPECIFIED: ICD-10-CM

## 2018-04-28 LAB
ANION GAP SERPL CALC-SCNC: 13 MMOL/L — SIGNIFICANT CHANGE UP (ref 5–17)
BUN SERPL-MCNC: 20 MG/DL — SIGNIFICANT CHANGE UP (ref 8–20)
CALCIUM SERPL-MCNC: 9.4 MG/DL — SIGNIFICANT CHANGE UP (ref 8.6–10.2)
CHLORIDE SERPL-SCNC: 109 MMOL/L — HIGH (ref 98–107)
CO2 SERPL-SCNC: 26 MMOL/L — SIGNIFICANT CHANGE UP (ref 22–29)
CREAT SERPL-MCNC: 0.67 MG/DL — SIGNIFICANT CHANGE UP (ref 0.5–1.3)
GLUCOSE SERPL-MCNC: 196 MG/DL — HIGH (ref 70–115)
HCT VFR BLD CALC: 38.6 % — SIGNIFICANT CHANGE UP (ref 37–47)
HGB BLD-MCNC: 12.3 G/DL — SIGNIFICANT CHANGE UP (ref 12–16)
MAGNESIUM SERPL-MCNC: 2.5 MG/DL — SIGNIFICANT CHANGE UP (ref 1.6–2.6)
MCHC RBC-ENTMCNC: 31.5 PG — HIGH (ref 27–31)
MCHC RBC-ENTMCNC: 31.9 G/DL — LOW (ref 32–36)
MCV RBC AUTO: 98.7 FL — SIGNIFICANT CHANGE UP (ref 81–99)
PHOSPHATE SERPL-MCNC: 2.9 MG/DL — SIGNIFICANT CHANGE UP (ref 2.4–4.7)
PLATELET # BLD AUTO: 209 K/UL — SIGNIFICANT CHANGE UP (ref 150–400)
POTASSIUM SERPL-MCNC: 3.6 MMOL/L — SIGNIFICANT CHANGE UP (ref 3.5–5.3)
POTASSIUM SERPL-SCNC: 3.6 MMOL/L — SIGNIFICANT CHANGE UP (ref 3.5–5.3)
RBC # BLD: 3.91 M/UL — LOW (ref 4.4–5.2)
RBC # FLD: 14.4 % — SIGNIFICANT CHANGE UP (ref 11–15.6)
SODIUM SERPL-SCNC: 148 MMOL/L — HIGH (ref 135–145)
WBC # BLD: 10.8 K/UL — SIGNIFICANT CHANGE UP (ref 4.8–10.8)
WBC # FLD AUTO: 10.8 K/UL — SIGNIFICANT CHANGE UP (ref 4.8–10.8)

## 2018-04-28 PROCEDURE — 99291 CRITICAL CARE FIRST HOUR: CPT

## 2018-04-28 PROCEDURE — 70450 CT HEAD/BRAIN W/O DYE: CPT | Mod: 26

## 2018-04-28 PROCEDURE — 99232 SBSQ HOSP IP/OBS MODERATE 35: CPT

## 2018-04-28 RX ORDER — LABETALOL HCL 100 MG
100 TABLET ORAL EVERY 12 HOURS
Qty: 0 | Refills: 0 | Status: DISCONTINUED | OUTPATIENT
Start: 2018-04-28 | End: 2018-04-29

## 2018-04-28 RX ORDER — POTASSIUM CHLORIDE 20 MEQ
10 PACKET (EA) ORAL
Qty: 0 | Refills: 0 | Status: COMPLETED | OUTPATIENT
Start: 2018-04-28 | End: 2018-04-28

## 2018-04-28 RX ADMIN — Medication 650 MILLIGRAM(S): at 20:24

## 2018-04-28 RX ADMIN — NICARDIPINE HYDROCHLORIDE 25 MG/HR: 30 CAPSULE, EXTENDED RELEASE ORAL at 03:07

## 2018-04-28 RX ADMIN — Medication 100 MILLIEQUIVALENT(S): at 08:31

## 2018-04-28 RX ADMIN — Medication 1 APPLICATION(S): at 05:36

## 2018-04-28 RX ADMIN — Medication 100 MILLIEQUIVALENT(S): at 08:42

## 2018-04-28 RX ADMIN — CHLORHEXIDINE GLUCONATE 15 MILLILITER(S): 213 SOLUTION TOPICAL at 18:40

## 2018-04-28 RX ADMIN — Medication 650 MILLIGRAM(S): at 13:11

## 2018-04-28 RX ADMIN — Medication 100 MILLIGRAM(S): at 13:11

## 2018-04-28 RX ADMIN — Medication 1 MILLIGRAM(S): at 13:13

## 2018-04-28 RX ADMIN — Medication 100 MILLIGRAM(S): at 13:13

## 2018-04-28 RX ADMIN — Medication 20 MILLIGRAM(S): at 20:24

## 2018-04-28 RX ADMIN — Medication 1 APPLICATION(S): at 13:12

## 2018-04-28 RX ADMIN — CHLORHEXIDINE GLUCONATE 15 MILLILITER(S): 213 SOLUTION TOPICAL at 05:36

## 2018-04-28 RX ADMIN — SENNA PLUS 2 TABLET(S): 8.6 TABLET ORAL at 22:22

## 2018-04-28 RX ADMIN — AMLODIPINE BESYLATE 10 MILLIGRAM(S): 2.5 TABLET ORAL at 05:36

## 2018-04-28 RX ADMIN — Medication 100 MILLIGRAM(S): at 18:40

## 2018-04-28 RX ADMIN — LACTULOSE 20 GRAM(S): 10 SOLUTION ORAL at 20:24

## 2018-04-28 RX ADMIN — PANTOPRAZOLE SODIUM 40 MILLIGRAM(S): 20 TABLET, DELAYED RELEASE ORAL at 13:12

## 2018-04-28 RX ADMIN — Medication 1 APPLICATION(S): at 18:40

## 2018-04-28 RX ADMIN — Medication 100 MILLIEQUIVALENT(S): at 09:32

## 2018-04-28 NOTE — PROGRESS NOTE ADULT - SUBJECTIVE AND OBJECTIVE BOX
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 Poor mentla status, intubated . Required cardene drip for BP control. Non-surgical candidate per neurosurg.    Events last 24 hours:   -remains on full vent support  -bush replaced for urinary retention  -followed some simple commands today  -pedning CT head, ICH stable may start DVT prophylaxis (lovenox)    PAST MEDICAL & SURGICAL HISTORY:  Alcohol abuse  No significant past surgical history      Review of Systems:  N/A      Medications:    amLODIPine   Tablet 10 milliGRAM(s) Oral daily  hydrALAZINE Injectable 20 milliGRAM(s) IV Push every 6 hours PRN  labetalol 100 milliGRAM(s) Oral every 12 hours  labetalol Injectable 20 milliGRAM(s) IV Push every 2 hours PRN  niCARdipine Infusion 5 mG/Hr IV Continuous <Continuous>      acetaminophen    Suspension 650 milliGRAM(s) Oral every 6 hours PRN        lactulose Syrup 20 Gram(s) Oral every 6 hours PRN  pantoprazole  Injectable 40 milliGRAM(s) IV Push daily  polyethylene glycol 3350 17 Gram(s) Oral daily PRN  senna 2 Tablet(s) Oral at bedtime        folic acid 1 milliGRAM(s) Oral daily  thiamine 100 milliGRAM(s) Oral daily      chlorhexidine 0.12% Liquid 15 milliLiter(s) Swish and Spit two times a day  petrolatum Ophthalmic Ointment 1 Application(s) Both EYES every 6 hours        Mode: AC/ CMV (Assist Control/ Continuous Mandatory Ventilation)  RR (machine): 20  TV (machine): 400  FiO2: 21  PEEP: 5  MAP: 10  PIP: 23      ICU Vital Signs Last 24 Hrs  T(C): 37.9 (28 Apr 2018 20:00), Max: 40.6 (28 Apr 2018 01:30)  T(F): 100.3 (28 Apr 2018 20:00), Max: 105.1 (28 Apr 2018 01:30)  HR: 78 (28 Apr 2018 20:04) (78 - 105)  BP: 161/68 (28 Apr 2018 20:00) (120/64 - 163/77)  BP(mean): 104 (28 Apr 2018 20:00) (86 - 111)  ABP: --  ABP(mean): --  RR: 25 (28 Apr 2018 20:00) (24 - 33)  SpO2: 98% (28 Apr 2018 20:04) (95% - 100%)          I&O's Detail    27 Apr 2018 07:01  -  28 Apr 2018 07:00  --------------------------------------------------------  IN:    Enteral Tube Flush: 60 mL    Jevity: 1140 mL    niCARdipine Infusion: 1125 mL  Total IN: 2325 mL    OUT:    Indwelling Catheter - Urethral: 310 mL    Intermittent Catheterization - Urethral: 500 mL  Total OUT: 810 mL    Total NET: 1515 mL      28 Apr 2018 07:01  -  28 Apr 2018 20:49  --------------------------------------------------------  IN:    Enteral Tube Flush: 400 mL    Jevity: 780 mL    niCARdipine Infusion: 156.5 mL  Total IN: 1336.5 mL    OUT:    Intermittent Catheterization - Urethral: 400 mL  Total OUT: 400 mL    Total NET: 936.5 mL            LABS:                        12.3   10.8  )-----------( 209      ( 28 Apr 2018 05:49 )             38.6     04-28    148<H>  |  109<H>  |  20.0  ----------------------------<  196<H>  3.6   |  26.0  |  0.67    Ca    9.4      28 Apr 2018 05:49  Phos  2.9     04-28  Mg     2.5     04-28            CAPILLARY BLOOD GLUCOSE            CULTURES:  Culture Results:   No growth at 48 hours (04-25-18 @ 17:45)  Culture Results:   No growth at 48 hours (04-25-18 @ 17:44)  Culture Results:   No growth at 48 hours (04-24-18 @ 01:07)  Culture Results:   No growth at 48 hours (04-24-18 @ 01:07)  Culture Results:   No growth (04-24-18 @ 01:06)      Physical Examination:    General: Intubated, slightly more responsive today    HEENT: Pupils non-reactive to light    PULM: Clear to auscultation bilaterally, no significant sputum production    CVS: Regular rate and rhythm, no murmurs, rubs, or gallops    ABD: Soft, nondistended, nontender, normoactive bowel sounds, no masses    EXT: No edema, nontender    SKIN: Warm and well perfused, no rashes noted.      RADIOLOGY: repeat head CT pending tonight    CRITICAL CARE TIME SPENT: 35 minutes spent evaluating/treating patient, reviewing labs/imaging, and discussing care with bedside team/family

## 2018-04-28 NOTE — PROGRESS NOTE ADULT - ASSESSMENT
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 Poor mentla status, intubated . Required cardene drip for BP control. Non-surgical candidate per neurosurg.    Events last 24 hours:   -remains on full vent support  -bush replaced for urinary retention  -followed some simple commands today  -pedning CT head, ICH stable may start DVT prophylaxis (lovenox)

## 2018-04-28 NOTE — PROGRESS NOTE ADULT - PROBLEM SELECTOR PLAN 3
-remains on Cardene drip, will continue to titrate for SBP <140-160  -PO norvasc and labetalol to continue added, PRN labetalol as well. Will give PO meds with goal of coming off cardene drip. Cardene drip has been decreased over day shift

## 2018-04-28 NOTE — PROGRESS NOTE ADULT - SUBJECTIVE AND OBJECTIVE BOX
[facility]                                     Neurology at [place]                                 Rima Whiteside, & Saad                                  [address]                                        [place], [place], [address]                                             [phone]    HISTORY:  HPI:  The patient is a 63y Female who c/o dizziness and then collapsed at home.  She had poor mental status and was intubated in the ED.  She was found to have large brainstem bleed on CT.  Her HTN was controlled with nicardipine drip.  She is now in MICU, with eyes open and roving vertical eye movements and unresponsive.  Neuro eval was requested.    Interval history: appears to be following simple commands today    MEDICATIONS  (STANDING):  amLODIPine   Tablet 10 milliGRAM(s) Oral daily  chlorhexidine 0.12% Liquid 15 milliLiter(s) Swish and Spit two times a day  folic acid 1 milliGRAM(s) Oral daily  labetalol 100 milliGRAM(s) Oral every 12 hours  niCARdipine Infusion 5 mG/Hr (25 mL/Hr) IV Continuous <Continuous>  pantoprazole  Injectable 40 milliGRAM(s) IV Push daily  petrolatum Ophthalmic Ointment 1 Application(s) Both EYES every 6 hours  senna 2 Tablet(s) Oral at bedtime  thiamine 100 milliGRAM(s) Oral daily    MEDICATIONS  (PRN):  acetaminophen    Suspension 650 milliGRAM(s) Oral every 6 hours PRN For Temp greater than 38 C (100.4 F)  hydrALAZINE Injectable 20 milliGRAM(s) IV Push every 6 hours PRN systolic > 150  labetalol Injectable 20 milliGRAM(s) IV Push every 2 hours PRN systolic > 150  lactulose Syrup 20 Gram(s) Oral every 6 hours PRN Constpation  polyethylene glycol 3350 17 Gram(s) Oral daily PRN Constipation      ROS:  unobtainable to due condition    Vital Signs Last 24 Hrs  T(C): 37.7 (28 Apr 2018 06:25), Max: 40.6 (28 Apr 2018 01:30)  T(F): 99.9 (28 Apr 2018 06:25), Max: 105.1 (28 Apr 2018 01:30)  HR: 102 (28 Apr 2018 13:00) (90 - 105)  BP: 150/78 (28 Apr 2018 13:00) (120/64 - 150/78)  BP(mean): 105 (28 Apr 2018 13:00) (86 - 110)  RR: 30 (28 Apr 2018 13:00) (24 - 33)  SpO2: 95% (28 Apr 2018 13:00) (95% - 100%)    General: NAD, on CPAP     Detailed Neurologic Exam:    Mental status: The patient has eyes open with roving up and down eye movements  Follows simple commands such as close eyes and wiggle toes    Cranial nerves: . pinpoint NR pupils (pontine), absent corneal, spontaneous vertical eye movements only, (+) gag. unable to assess facial sensation, hearing, palate elevation shoulder shrug and tongue extension    Motor: There is b/l decorticate posturing to noxious stimuli in BUE, triple flexion to plantar stimulation BLE, Can wiggle toes to request (not reflexic)    Sensation grimace to pinch 4 ext    Reflexes: silent and plantar responses are extensor., (+) grasp reflex    Cerebellar: Unable to test dysmetria on finger to nose testing.    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

## 2018-04-28 NOTE — PROGRESS NOTE ADULT - ASSESSMENT
The patient is a 63y Female who is followed by neurology because of ICH    ICH  - large pontine ICH, exam improving somewhat in that she follows commands today  - avoid anti platelet medications  - avoid anti coagulant medications  - control BP, avoid SBP>160    DVT prophylaxis  - continue with intermittent compression stockings  - check head CT today, if ICH is stable/beginning to resolve would be ok with DVT dose Enoxaparin today with tight BP control tomorrow.   - still risk of hemorrhage expansion, but as long as bleeding has stopped and BP is tightly controlled the risk of rebleeed/expansion should be low by tomorrow    HTN  - nicardipine drip    EtOH abuse  may need counselling if she survives this event    poor prognosis, d/w , mother and sister  need to decide on PEG/trach vs comfort care.  Would wait few days to make that decision to see if there is further improvement in exam.    d/w Schuyler ZAMORA    will follow with you.    Anirudh Abarca MD PhD   [phone]

## 2018-04-28 NOTE — PROGRESS NOTE ADULT - PROBLEM SELECTOR PLAN 1
-mental status slightly improved today, follwed some simple commands  -will continue neuro exams  -repeat head CT tonight, if ICH stable, will start DVT prophylactic dosed lovenoc as per neuro note  -BP control  -family at bedside, will continue goals of care discussion

## 2018-04-28 NOTE — PROGRESS NOTE ADULT - SUBJECTIVE AND OBJECTIVE BOX
Patient is a 63y old  Female who presents with a chief complaint of complained of dizzyness and collapsed at home (2018 10:59)      BRIEF HOSPITAL COURSE: 63 yof with pontine hemorrhage    Events last 24 hours: no acute issues    PAST MEDICAL & SURGICAL HISTORY:  Alcohol abuse  No significant past surgical history      Review of Systems:  unable to obtain      MEDICATIONS  (STANDING):  amLODIPine   Tablet 10 milliGRAM(s) Oral daily  chlorhexidine 0.12% Liquid 15 milliLiter(s) Swish and Spit two times a day  folic acid 1 milliGRAM(s) Oral daily  niCARdipine Infusion 5 mG/Hr (25 mL/Hr) IV Continuous <Continuous>  pantoprazole  Injectable 40 milliGRAM(s) IV Push daily  petrolatum Ophthalmic Ointment 1 Application(s) Both EYES every 6 hours  senna 2 Tablet(s) Oral at bedtime  thiamine 100 milliGRAM(s) Oral daily    MEDICATIONS  (PRN):  acetaminophen    Suspension 650 milliGRAM(s) Oral every 6 hours PRN For Temp greater than 38 C (100.4 F)  hydrALAZINE Injectable 20 milliGRAM(s) IV Push every 6 hours PRN systolic > 150  labetalol Injectable 20 milliGRAM(s) IV Push every 2 hours PRN systolic > 150  lactulose Syrup 20 Gram(s) Oral every 6 hours PRN Constpation  polyethylene glycol 3350 17 Gram(s) Oral daily PRN Constipation        Mode: CPAP with PS  FiO2: 21  PEEP: 5  PS: 10  MAP: 9      ICU Vital Signs Last 24 Hrs  T(C): 37.7 (2018 06:25), Max: 40.6 (2018 01:30)  T(F): 99.9 (2018 06:25), Max: 105.1 (2018 01:30)  HR: 93 (2018 09:00) (90 - 111)  BP: 128/75 (2018 09:00) (120/61 - 144/81)  BP(mean): 96 (2018 09:00) (84 - 106)  ABP: --  ABP(mean): --  RR: 27 (2018 09:00) (24 - 33)  SpO2: 99% (2018 09:00) (95% - 100%)        I&O's Summary    2018 07:01  -  2018 07:00  --------------------------------------------------------  IN: 2325 mL / OUT: 810 mL / NET: 1515 mL        LABS:                 148<H>  |  109<H>  |  20.0  ----------------------------<  196<H>  3.6   |  26.0  |  0.67    Ca    9.4      2018 05:49  Phos  2.9       Mg     2.5                               12.3   10.8  )-----------( 209      ( 2018 05:49 )             38.6     CAPILLARY BLOOD GLUCOSE      Urinalysis Basic - ( 2018 09:34 )    Color: Yellow / Appearance: Clear / S.015 / pH: x  Gluc: x / Ketone: Negative  / Bili: Negative / Urobili: 1 mg/dL   Blood: x / Protein: 30 mg/dL / Nitrite: Negative   Leuk Esterase: Trace / RBC: 0-2 /HPF / WBC 10-15 /HPF   Sq Epi: x / Non Sq Epi: x / Bacteria: x      CULTURES:  Culture Results:   No growth at 48 hours (18 @ 01:07)  Culture Results:   No growth at 48 hours (18 @ 01:07)  Culture Results:   No growth (18 @ 01:06)      Physical Examination:    General: No acute distress.  no sedation    HEENT: Pupils equal, reactive to light.  Symmetric.  Eyes open    PULM: Clear to auscultation bilaterally, no significant sputum production    CVS: Regular rate and rhythm, no murmurs, rubs, or gallops    ABD: Soft, nondistended, nontender, normoactive bowel sounds, no masses    EXT: No edema, nontender    SKIN: Warm and well perfused, no rashes noted.    NEURO: No gag, no cough, eyes open to verbal stimuli, doesn't appear to follow my commands but may follow some of 's commands. Patient is a 63y old  Female who presents with a chief complaint of complained of dizzyness and collapsed at home (2018 10:59)      BRIEF HOSPITAL COURSE: 63 yof with pontine hemorrhage    Events last 24 hours: had urinary retention, bush re-inserted    PAST MEDICAL & SURGICAL HISTORY:  Alcohol abuse  No significant past surgical history      Review of Systems:  unable to obtain      MEDICATIONS  (STANDING):  amLODIPine   Tablet 10 milliGRAM(s) Oral daily  chlorhexidine 0.12% Liquid 15 milliLiter(s) Swish and Spit two times a day  folic acid 1 milliGRAM(s) Oral daily  niCARdipine Infusion 5 mG/Hr (25 mL/Hr) IV Continuous <Continuous>  pantoprazole  Injectable 40 milliGRAM(s) IV Push daily  petrolatum Ophthalmic Ointment 1 Application(s) Both EYES every 6 hours  senna 2 Tablet(s) Oral at bedtime  thiamine 100 milliGRAM(s) Oral daily    MEDICATIONS  (PRN):  acetaminophen    Suspension 650 milliGRAM(s) Oral every 6 hours PRN For Temp greater than 38 C (100.4 F)  hydrALAZINE Injectable 20 milliGRAM(s) IV Push every 6 hours PRN systolic > 150  labetalol Injectable 20 milliGRAM(s) IV Push every 2 hours PRN systolic > 150  lactulose Syrup 20 Gram(s) Oral every 6 hours PRN Constpation  polyethylene glycol 3350 17 Gram(s) Oral daily PRN Constipation        Mode: CPAP with PS  FiO2: 21  PEEP: 5  PS: 10  MAP: 9      ICU Vital Signs Last 24 Hrs  T(C): 37.7 (2018 06:25), Max: 40.6 (2018 01:30)  T(F): 99.9 (2018 06:25), Max: 105.1 (2018 01:30)  HR: 93 (2018 09:00) (90 - 111)  BP: 128/75 (2018 09:00) (120/61 - 144/81)  BP(mean): 96 (2018 09:00) (84 - 106)  ABP: --  ABP(mean): --  RR: 27 (2018 09:00) (24 - 33)  SpO2: 99% (2018 09:00) (95% - 100%)        I&O's Summary    2018 07:01  -  2018 07:00  --------------------------------------------------------  IN: 2325 mL / OUT: 810 mL / NET: 1515 mL        LABS:                 148<H>  |  109<H>  |  20.0  ----------------------------<  196<H>  3.6   |  26.0  |  0.67    Ca    9.4      2018 05:49  Phos  2.9       Mg     2.5                               12.3   10.8  )-----------( 209      ( 2018 05:49 )             38.6     CAPILLARY BLOOD GLUCOSE      Urinalysis Basic - ( 2018 09:34 )    Color: Yellow / Appearance: Clear / S.015 / pH: x  Gluc: x / Ketone: Negative  / Bili: Negative / Urobili: 1 mg/dL   Blood: x / Protein: 30 mg/dL / Nitrite: Negative   Leuk Esterase: Trace / RBC: 0-2 /HPF / WBC 10-15 /HPF   Sq Epi: x / Non Sq Epi: x / Bacteria: x      CULTURES:  Culture Results:   No growth at 48 hours (18 @ 01:07)  Culture Results:   No growth at 48 hours (18 @ 01:07)  Culture Results:   No growth (18 @ 01:06)      Physical Examination:    General: No acute distress.  no sedation    HEENT: Pupils equal, reactive to light.  Symmetric.  Eyes open    PULM: Clear to auscultation bilaterally, no significant sputum production    CVS: Regular rate and rhythm, no murmurs, rubs, or gallops    ABD: Soft, nondistended, nontender, normoactive bowel sounds, no masses    EXT: No edema, nontender    SKIN: Warm and well perfused, no rashes noted.    NEURO: No gag, no cough, eyes open to verbal stimuli, doesn't appear to follow my commands but may follow some of 's commands.     DEVICES: bush reinserted

## 2018-04-28 NOTE — PROGRESS NOTE ADULT - PROBLEM SELECTOR PLAN 6
added free water.  Given the volume positive last 24 hours, will have to evaluate today what the i/o status is -- may have been because of urinary retention; if not, may require diuretic

## 2018-04-28 NOTE — PROGRESS NOTE ADULT - ATTENDING COMMENTS
I updated the patient's  at bedside extensively, and a family member on the phone; showed CT scan.  Continue aggressive management for now; he did indicate how independent she was, and is considering very thoughtfully what she would want in this scenario. I updated the patient's  at bedside extensively, and a family member on the phone; showed CT scan.  Continue aggressive management for now; he did indicate how independent she was, and is considering very thoughtfully what she would want in this scenario.    Would query neurology service timing of enoxaparin initiation -- will need VTE prophylaxis.

## 2018-04-29 LAB
ANION GAP SERPL CALC-SCNC: 12 MMOL/L — SIGNIFICANT CHANGE UP (ref 5–17)
BUN SERPL-MCNC: 18 MG/DL — SIGNIFICANT CHANGE UP (ref 8–20)
CALCIUM SERPL-MCNC: 9.6 MG/DL — SIGNIFICANT CHANGE UP (ref 8.6–10.2)
CHLORIDE SERPL-SCNC: 107 MMOL/L — SIGNIFICANT CHANGE UP (ref 98–107)
CO2 SERPL-SCNC: 27 MMOL/L — SIGNIFICANT CHANGE UP (ref 22–29)
CREAT SERPL-MCNC: 0.47 MG/DL — LOW (ref 0.5–1.3)
CULTURE RESULTS: SIGNIFICANT CHANGE UP
CULTURE RESULTS: SIGNIFICANT CHANGE UP
GLUCOSE SERPL-MCNC: 201 MG/DL — HIGH (ref 70–115)
MAGNESIUM SERPL-MCNC: 2.4 MG/DL — SIGNIFICANT CHANGE UP (ref 1.6–2.6)
PHOSPHATE SERPL-MCNC: 3.1 MG/DL — SIGNIFICANT CHANGE UP (ref 2.4–4.7)
POTASSIUM SERPL-MCNC: 3.8 MMOL/L — SIGNIFICANT CHANGE UP (ref 3.5–5.3)
POTASSIUM SERPL-SCNC: 3.8 MMOL/L — SIGNIFICANT CHANGE UP (ref 3.5–5.3)
PROCALCITONIN SERPL-MCNC: 0.23 NG/ML — HIGH (ref 0–0.04)
SODIUM SERPL-SCNC: 146 MMOL/L — HIGH (ref 135–145)
SPECIMEN SOURCE: SIGNIFICANT CHANGE UP
SPECIMEN SOURCE: SIGNIFICANT CHANGE UP

## 2018-04-29 PROCEDURE — 99233 SBSQ HOSP IP/OBS HIGH 50: CPT

## 2018-04-29 PROCEDURE — 70450 CT HEAD/BRAIN W/O DYE: CPT | Mod: 26

## 2018-04-29 PROCEDURE — 99291 CRITICAL CARE FIRST HOUR: CPT

## 2018-04-29 RX ORDER — LABETALOL HCL 100 MG
100 TABLET ORAL EVERY 8 HOURS
Qty: 0 | Refills: 0 | Status: DISCONTINUED | OUTPATIENT
Start: 2018-04-29 | End: 2018-04-30

## 2018-04-29 RX ORDER — ENOXAPARIN SODIUM 100 MG/ML
40 INJECTION SUBCUTANEOUS DAILY
Qty: 0 | Refills: 0 | Status: DISCONTINUED | OUTPATIENT
Start: 2018-04-29 | End: 2018-05-08

## 2018-04-29 RX ORDER — FUROSEMIDE 40 MG
40 TABLET ORAL ONCE
Qty: 0 | Refills: 0 | Status: COMPLETED | OUTPATIENT
Start: 2018-04-29 | End: 2018-04-29

## 2018-04-29 RX ADMIN — Medication 100 MILLIGRAM(S): at 14:14

## 2018-04-29 RX ADMIN — PANTOPRAZOLE SODIUM 40 MILLIGRAM(S): 20 TABLET, DELAYED RELEASE ORAL at 11:11

## 2018-04-29 RX ADMIN — ENOXAPARIN SODIUM 40 MILLIGRAM(S): 100 INJECTION SUBCUTANEOUS at 15:55

## 2018-04-29 RX ADMIN — Medication 1 APPLICATION(S): at 06:19

## 2018-04-29 RX ADMIN — CHLORHEXIDINE GLUCONATE 15 MILLILITER(S): 213 SOLUTION TOPICAL at 17:39

## 2018-04-29 RX ADMIN — NICARDIPINE HYDROCHLORIDE 25 MG/HR: 30 CAPSULE, EXTENDED RELEASE ORAL at 15:57

## 2018-04-29 RX ADMIN — Medication 1 APPLICATION(S): at 11:11

## 2018-04-29 RX ADMIN — Medication 100 MILLIGRAM(S): at 06:19

## 2018-04-29 RX ADMIN — POLYETHYLENE GLYCOL 3350 17 GRAM(S): 17 POWDER, FOR SOLUTION ORAL at 06:19

## 2018-04-29 RX ADMIN — CHLORHEXIDINE GLUCONATE 15 MILLILITER(S): 213 SOLUTION TOPICAL at 06:19

## 2018-04-29 RX ADMIN — Medication 100 MILLIGRAM(S): at 11:11

## 2018-04-29 RX ADMIN — Medication 1 MILLIGRAM(S): at 11:11

## 2018-04-29 RX ADMIN — SENNA PLUS 2 TABLET(S): 8.6 TABLET ORAL at 21:20

## 2018-04-29 RX ADMIN — Medication 40 MILLIGRAM(S): at 11:11

## 2018-04-29 RX ADMIN — NICARDIPINE HYDROCHLORIDE 25 MG/HR: 30 CAPSULE, EXTENDED RELEASE ORAL at 03:31

## 2018-04-29 RX ADMIN — AMLODIPINE BESYLATE 10 MILLIGRAM(S): 2.5 TABLET ORAL at 06:19

## 2018-04-29 RX ADMIN — Medication 1 APPLICATION(S): at 17:39

## 2018-04-29 RX ADMIN — Medication 1 APPLICATION(S): at 00:26

## 2018-04-29 RX ADMIN — Medication 100 MILLIGRAM(S): at 21:17

## 2018-04-29 NOTE — PROGRESS NOTE ADULT - ASSESSMENT
63 year old female with large pontine hemorrhagic CVA ( Non-surgical candidate per neurosurgery)  with acute respiratory failure and poor mental status also currently hypertensive crisis  requiring cardene drip for systolic BP control.     Critical Care time: 35 mins assessing presenting problems of acute illness that poses high probability of life threatening deterioration or end organ damage/dysfunction.  Medical decision making inculding Initiating plan of care, reviewing data, reviewing radiology,direct patient bedside evaluation and interpretation of vital signs, any necessary ventilator management , discusion with multidisciplinary team, discussing goals of care with patient/family, all non inclusive of procedures

## 2018-04-29 NOTE — PROGRESS NOTE ADULT - PROBLEM SELECTOR PLAN 4
secondary to hypertensive hemmorrhagic stroke   some improvement with neurological exam today   concern for ability to mantiatn paitent airway remains   continue neuro checks   hold all sedative meds

## 2018-04-29 NOTE — PROGRESS NOTE ADULT - PROBLEM SELECTOR PLAN 1
Non-surgical candidate per neurosurgery   continue neuro checks every 1 hour  monitor for signs of rebleeding  control hypertension

## 2018-04-29 NOTE — PROGRESS NOTE ADULT - PROBLEM SELECTOR PLAN 1
titrate off cardene if blood pressure tolerates  no neurosurgical intervention  labetolol start 100 BID will titrate dose up to try to continue to wean cardene gtt/amlodipine increased to 10mg.

## 2018-04-29 NOTE — PROGRESS NOTE ADULT - SUBJECTIVE AND OBJECTIVE BOX
Patient is a 63y old  Female who presents with a chief complaint of complained of dizzyness and collapsed at home (23 Apr 2018 10:59)      BRIEF HOSPITAL COURSE: 63 yof with pontine hemorrhage, respiratory failure    Events last 24 hours: Pt has eyes open, follows some simple commands, nods head yes/no that does seem to be appropriate at times.  Remains on low dose Cardene gtt for BP control.  Failed SBT this am, will trial again later this afternoon.    PAST MEDICAL & SURGICAL HISTORY:  Alcohol abuse  No significant past surgical history      Review of Systems:  unable to obtain    MEDICATIONS  (STANDING):  amLODIPine   Tablet 10 milliGRAM(s) Oral daily  chlorhexidine 0.12% Liquid 15 milliLiter(s) Swish and Spit two times a day  enoxaparin Injectable 40 milliGRAM(s) SubCutaneous daily  folic acid 1 milliGRAM(s) Oral daily  labetalol 100 milliGRAM(s) Oral every 12 hours  niCARdipine Infusion 5 mG/Hr (25 mL/Hr) IV Continuous <Continuous>  pantoprazole  Injectable 40 milliGRAM(s) IV Push daily  petrolatum Ophthalmic Ointment 1 Application(s) Both EYES every 6 hours  senna 2 Tablet(s) Oral at bedtime  thiamine 100 milliGRAM(s) Oral daily    ICU Vital Signs Last 24 Hrs  T(C): 37.4 (29 Apr 2018 09:00), Max: 38.7 (28 Apr 2018 20:30)  T(F): 99.4 (29 Apr 2018 09:00), Max: 101.7 (28 Apr 2018 20:30)  HR: 87 (29 Apr 2018 09:00) (78 - 102)  BP: 152/77 (29 Apr 2018 09:00) (126/65 - 163/77)  BP(mean): 109 (29 Apr 2018 09:00) (89 - 111)  ABP: --  ABP(mean): --  RR: 35 (29 Apr 2018 09:00) (24 - 36)  SpO2: 98% (29 Apr 2018 09:00) (95% - 100%)    Mode: AC/ CMV (Assist Control/ Continuous Mandatory Ventilation)  RR (machine): 20  TV (machine): 400  FiO2: 21  PEEP: 5  MAP: 9  PIP: 23       146    |  107    |  18.0   ----------------------------<  201        ( 29 Apr 2018 07:21 )  3.8     |  27.0   |  0.47     Ca    9.6        ( 29 Apr 2018 07:21 )  Phos  3.1       ( 29 Apr 2018 07:21 )  Mg     2.4       ( 29 Apr 2018 07:21 )            CAPILLARY BLOOD GLUCOSE          Physical Examination:    General: No acute distress.  no sedation    HEENT: Pupils equal, reactive to light.  Symmetric.  Eyes open, blinks on command.    PULM: Clear to auscultation bilaterally, no significant sputum production    CVS: Regular rate and rhythm, no murmurs, rubs, or gallops    ABD: Soft, nondistended, nontender, normoactive bowel sounds, no masses    EXT: No edema, nontender    SKIN: Warm and well perfused, no rashes noted.    NEURO: No gag, no cough, eyes open to verbal stimuli, follows some commands    DEVICES: bush reinserted for urinary retention yesterday

## 2018-04-29 NOTE — PROGRESS NOTE ADULT - ATTENDING COMMENTS
I saw this patient independently in the AM and again at 1230 and agree with the above; she has more movements today which appear nonpurposeful; eyes moving laterally (both sides); both arms with ?extensor posturing without noxious stimulation.  Patient was placed on volume control/assist control earlier due to tachypnea and these movements.  Ordered stat CT to evaluate for hematoma expansion in setting of declining neurologic examination.  Discussed with Dr Abarca -- ?eeg vs continuing to monitor.  Pontine hemorrhage less likely to cause seizure.    Updated  extensively at bedside, along with niece.  Continued goals of care discussions -- will need family meeting tomorrow.

## 2018-04-29 NOTE — PROGRESS NOTE ADULT - PROBLEM SELECTOR PLAN 2
Failed SBT this am, will trial again this afternoon  Possible trach, ongoing discussions with  about goals of care.

## 2018-04-29 NOTE — PROGRESS NOTE ADULT - SUBJECTIVE AND OBJECTIVE BOX
[facility]                                     Neurology at [place]                                 Rima Whiteside, & Saad                                  [address]                                        [place], [place], [address]                                             [phone]    HISTORY:  HPI:  The patient is a 63y Female who c/o dizziness and then collapsed at home.  She had poor mental status and was intubated in the ED.  She was found to have large brainstem bleed on CT.  Her HTN was controlled with nicardipine drip.  She is now in MICU, with eyes open and roving vertical eye movements and unresponsive.  Neuro eval was requested.    Interval history: appears to be not as well today, not following commands as consistently as yesterday.  More posturing today with right arm    MEDICATIONS  (STANDING):  amLODIPine   Tablet 10 milliGRAM(s) Oral daily  chlorhexidine 0.12% Liquid 15 milliLiter(s) Swish and Spit two times a day  enoxaparin Injectable 40 milliGRAM(s) SubCutaneous daily  folic acid 1 milliGRAM(s) Oral daily  labetalol 100 milliGRAM(s) Oral every 8 hours  niCARdipine Infusion 5 mG/Hr (25 mL/Hr) IV Continuous <Continuous>  pantoprazole  Injectable 40 milliGRAM(s) IV Push daily  petrolatum Ophthalmic Ointment 1 Application(s) Both EYES every 6 hours  senna 2 Tablet(s) Oral at bedtime  thiamine 100 milliGRAM(s) Oral daily    MEDICATIONS  (PRN):  acetaminophen    Suspension 650 milliGRAM(s) Oral every 6 hours PRN For Temp greater than 38 C (100.4 F)  hydrALAZINE Injectable 20 milliGRAM(s) IV Push every 6 hours PRN systolic > 150  labetalol Injectable 20 milliGRAM(s) IV Push every 2 hours PRN systolic > 150  lactulose Syrup 20 Gram(s) Oral every 6 hours PRN Constpation  polyethylene glycol 3350 17 Gram(s) Oral daily PRN Constipation      ROS:  unobtainable to due condition    Vital Signs Last 24 Hrs  T(C): 36.8 (29 Apr 2018 11:00), Max: 38.7 (28 Apr 2018 20:30)  T(F): 98.3 (29 Apr 2018 11:00), Max: 101.7 (28 Apr 2018 20:30)  HR: 80 (29 Apr 2018 12:10) (78 - 102)  BP: 140/77 (29 Apr 2018 11:00) (126/65 - 168/88)  BP(mean): 101 (29 Apr 2018 11:00) (89 - 121)  RR: 29 (29 Apr 2018 11:00) (24 - 36)  SpO2: 97% (29 Apr 2018 12:10) (95% - 100%)    General: NAD, had trouble on CPAP, vent changed to AC    Detailed Neurologic Exam:    Mental status: The patient has eyes open with roving up and down eye movements  Follows simple commands such as  wiggle toes but inconsistently today.    Cranial nerves: . pinpoint NR pupils (pontine), absent corneal, spontaneous vertical eye movements only, (+) gag. unable to assess facial sensation, hearing, palate elevation shoulder shrug and tongue extension    Motor: There is b/l decorticate posturing to noxious stimuli in BUE, triple flexion to plantar stimulation BLE, Can wiggle toes to request (not reflexic), however it is inconsistent today    Sensation no grimace to pinch 4 ext    Reflexes: silent and plantar responses are extensor., (+) grasp reflex b/l    Cerebellar: Unable to test dysmetria on finger to nose testing.    Gait : deferred    LABS:                                    12.3   10.8  )-----------( 209      ( 28 Apr 2018 05:49 )             38.6   04-29    146<H>  |  107  |  18.0  ----------------------------<  201<H>  3.8   |  27.0  |  0.47<L>    Ca    9.6      29 Apr 2018 07:21  Phos  3.1     04-29  Mg     2.4     04-29        RADIOLOGY & ADDITIONAL STUDIES (independently reviewed unless otherwise noted):  Recent neurological studies:  head CT 4/28/18: grossly unchanged pontine/4th ventricle ICH.  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

## 2018-04-29 NOTE — PROGRESS NOTE ADULT - PROBLEM SELECTOR PLAN 3
-Patient currently on Full vent support  -titrate settings to maintain SaO2 >90%, or pH >7.25  -consider low titdal volume ventilation strategy w/ goal Tv 4-6 cc/kg of ideal body weight  -plateu pressure goal <30  -Peridex oral care and VAP prophylaxis with HOB 30 degrees   -aggressive chest PT and suctioning   -daily sedation vacation with spontaneous breathing trial in the morning if clinical condition warrants, discuss with respiratory therapy

## 2018-04-29 NOTE — PROGRESS NOTE ADULT - ASSESSMENT
The patient is a 63y Female who is followed by neurology because of ICH    ICH  - some change in exam with increased posturing, repeat head CT today  - large pontine ICH, exam improving somewhat in that she follows commands today  - avoid anti platelet medications  - avoid anti coagulant medications, however if repeat head CT does not show increased bleed she theresa be started on DVT prophylaxis dose of lovenox because risk of DVT/PE is likely higher than risk of rebleed in this stage of ICH.  - control BP, avoid SBP>140    DVT prophylaxis  - continue with intermittent compression stockings  - check head CT today, if ICH is stable/beginning to resolve would be ok with DVT dose Enoxaparin today with tight BP control if repeat headt CT tosday is stable as was one from last night.   - still risk of hemorrhage expansion, but as long as bleeding has stopped and BP is tightly controlled the risk of rebleeed/expansion should be low by tomorrow    HTN  - nicardipine drip  - must keep SBP <140 if starting lovenox for DVT prevention    EtOH abuse  may need counselling if she survives this event    poor prognosis, d/w  at the bedside  need to decide on PEG/trach vs comfort care.  Would wait few days to make that decision to see if there is further improvement in exam.    d/w Dr Cardozo    will follow with you.    Anirudh Abarca MD PhD   [phone]

## 2018-04-29 NOTE — PROGRESS NOTE ADULT - SUBJECTIVE AND OBJECTIVE BOX
63y  Female  No Known Allergies    CC: Patient is a 63y old  Female who presents with a chief complaint of complained of dizzyness and collapsed at home (23 Apr 2018 10:59)    HPI:  62F with a history of alcoholism, who presented with dizziness and then collapsed at home. Upon arrival in the ER she was obtunded and intubated for airway support. He BP was noted to be elevated. An urgent CT scan of the head revealed a large pontine bleed and no evidence of aneurysm or dissection noted on CTA,  neurosurgery consulted and confirmed there was no surgical intervention required. She Remains on low dose Cardene gtt for BP control.  She remains intubated in part due to concern for mental status and ability to manage airway but also due to failed spontaneous breathing trial  this am, will trial again later this morning.    PAST MEDICAL & SURGICAL HISTORY:  Alcohol abuse  No significant past surgical history    FAMILY HISTORY:  No pertinent family history in first degree relatives      Vitals   Vital Signs Last 24 Hrs  T(C): 38.4 (30 Apr 2018 00:00), Max: 38.4 (30 Apr 2018 00:00)  T(F): 101.1 (30 Apr 2018 00:00), Max: 101.1 (30 Apr 2018 00:00)  HR: 88 (30 Apr 2018 00:00) (80 - 101)  BP: 121/66 (30 Apr 2018 00:00) (113/69 - 177/91)  BP(mean): 88 (30 Apr 2018 00:00) (85 - 121)  RR: 29 (30 Apr 2018 00:00) (20 - 37)  SpO2: 97% (30 Apr 2018 00:00) (96% - 100%)    I&O's Summary    28 Apr 2018 07:01  -  29 Apr 2018 07:00  --------------------------------------------------------  IN: 2726.5 mL / OUT: 1350 mL / NET: 1376.5 mL    29 Apr 2018 07:01  -  30 Apr 2018 00:33  --------------------------------------------------------  IN: 1696.5 mL / OUT: 1305 mL / NET: 391.5 mL        LABS  04-29    146<H>  |  107  |  18.0  ----------------------------<  201<H>  3.8   |  27.0  |  0.47<L>    Ca    9.6      29 Apr 2018 07:21  Phos  3.1     04-29  Mg     2.4     04-29                            12.3   10.8  )-----------( 209      ( 28 Apr 2018 05:49 )             38.6             CAPILLARY BLOOD GLUCOSE            VENT SETTINGS   Mode: AC/ CMV (Assist Control/ Continuous Mandatory Ventilation)  RR (machine): 20  TV (machine): 400  FiO2: 21  PEEP: 5  MAP: 8  PIP: 21      Meds  MEDICATIONS  (STANDING):  amLODIPine   Tablet 10 milliGRAM(s) Oral daily  chlorhexidine 0.12% Liquid 15 milliLiter(s) Swish and Spit two times a day  enoxaparin Injectable 40 milliGRAM(s) SubCutaneous daily  folic acid 1 milliGRAM(s) Oral daily  labetalol 100 milliGRAM(s) Oral every 8 hours  niCARdipine Infusion 5 mG/Hr (25 mL/Hr) IV Continuous <Continuous>  pantoprazole  Injectable 40 milliGRAM(s) IV Push daily  petrolatum Ophthalmic Ointment 1 Application(s) Both EYES every 6 hours  senna 2 Tablet(s) Oral at bedtime  thiamine 100 milliGRAM(s) Oral daily      REVIEW OF SYSTEMS:    CONSTITUTIONAL: No fever, weight loss, or fatigue  EYES: No eye pain, visual disturbances, or discharge  ENMT:  No difficulty hearing, tinnitus, vertigo; No sinus or throat pain  NECK: No pain or stiffness  BREASTS: No pain, masses, or nipple discharge  RESPIRATORY: No cough, wheezing, chills or hemoptysis; No shortness of breath  CARDIOVASCULAR: No chest pain, palpitations, dizziness, or leg swelling  GASTROINTESTINAL: No abdominal or epigastric pain. No nausea, vomiting, or hematemesis; No diarrhea or constipation. No melena or hematochezia.  GENITOURINARY: No dysuria, frequency, hematuria, or incontinence  NEUROLOGICAL: No headaches, memory loss, loss of strength, numbness, or tremors  SKIN: No itching, burning, rashes, or lesions   LYMPH NODES: No enlarged glands  ENDOCRINE: No heat or cold intolerance; No hair loss  MUSCULOSKELETAL: No joint pain or swelling; No muscle, back, or extremity pain  PSYCHIATRIC: No depression, anxiety, mood swings, or difficulty sleeping  HEME/LYMPH: No easy bruising, or bleeding gums  ALLERY AND IMMUNOLOGIC: No hives or eczema      Physicial Exam:     Constitutional: NAD, well-groomed, well-developed  HEENT: PERRLA, EOMI, no drainage or redness  Neck: No bruits; no thyromegaly or nodules,  No JVD  Back: Normal spine flexure, No CVA tenderness, No deformity or limitation of movement  Respiratory: Breath Sounds equal & clear to percussion & auscultation, no accessory muscle use  Cardiovascular: Regular rate & rhythm, normal S1, S2; no murmurs, gallops or rubs; no S3, S4  Gastrointestinal: Soft, non-tender, non distended no hepatosplenomegaly, normal bowel sounds  Extremities: No peripheral edema, No cyanosis, clubbing   Vascular: Equal and normal pulses: 2+ peripheral pulses throughout  Neurological: GCS:    A&O x 3; no sensory, motor  deficits, normal reflexes  Psychiatric: Normal mood, normal affect  Musculoskeletal: No joint pain, swelling or deformity; no limitation of movement  Skin: No rashes 63y  Female  No Known Allergies    CC: Patient is a 63y old  Female who presents with a chief complaint of complained of dizzyness and collapsed at home     HPI:  62F with a history of alcoholism, who presented with dizziness and then collapsed at home. Upon arrival in the ER she was obtunded and intubated for airway protection . Urgent CT scan of the head revealed a large pontine bleed and no evidence of aneurysm or dissection noted on CTA, neurosurgery consulted and confirmed there was no surgical intervention required. She Remains on low dose Cardene gtt for BP control.  She remains intubated in part due to concern for mental status and ability to manage airway but also due to failed spontaneous breathing trial  this am, will trial again later this morning.    PAST MEDICAL & SURGICAL HISTORY:  Alcohol abuse  No significant past surgical history    FAMILY HISTORY:  No pertinent family history in first degree relatives      Vitals   Vital Signs Last 24 Hrs  T(C): 38.4 (30 Apr 2018 00:00), Max: 38.4 (30 Apr 2018 00:00)  T(F): 101.1 (30 Apr 2018 00:00), Max: 101.1 (30 Apr 2018 00:00)  HR: 88 (30 Apr 2018 00:00) (80 - 101)  BP: 121/66 (30 Apr 2018 00:00) (113/69 - 177/91)  BP(mean): 88 (30 Apr 2018 00:00) (85 - 121)  RR: 29 (30 Apr 2018 00:00) (20 - 37)  SpO2: 97% (30 Apr 2018 00:00) (96% - 100%)    I&O's Summary    28 Apr 2018 07:01  -  29 Apr 2018 07:00  --------------------------------------------------------  IN: 2726.5 mL / OUT: 1350 mL / NET: 1376.5 mL    29 Apr 2018 07:01  -  30 Apr 2018 00:33  --------------------------------------------------------  IN: 1696.5 mL / OUT: 1305 mL / NET: 391.5 mL        LABS  04-29    146<H>  |  107  |  18.0  ----------------------------<  201<H>  3.8   |  27.0  |  0.47<L>    Ca    9.6      29 Apr 2018 07:21  Phos  3.1     04-29  Mg     2.4     04-29                        12.3   10.8  )-----------( 209      ( 28 Apr 2018 05:49 )             38.6         VENT SETTINGS   Mode: AC/ CMV (Assist Control/ Continuous Mandatory Ventilation)  RR (machine): 20  TV (machine): 400  FiO2: 21  PEEP: 5  MAP: 8  PIP: 21      Meds  MEDICATIONS  (STANDING):  amLODIPine   Tablet 10 milliGRAM(s) Oral daily  chlorhexidine 0.12% Liquid 15 milliLiter(s) Swish and Spit two times a day  enoxaparin Injectable 40 milliGRAM(s) SubCutaneous daily  folic acid 1 milliGRAM(s) Oral daily  labetalol 100 milliGRAM(s) Oral every 8 hours  niCARdipine Infusion 5 mG/Hr (25 mL/Hr) IV Continuous <Continuous>  pantoprazole  Injectable 40 milliGRAM(s) IV Push daily  petrolatum Ophthalmic Ointment 1 Application(s) Both EYES every 6 hours  senna 2 Tablet(s) Oral at bedtime  thiamine 100 milliGRAM(s) Oral daily      REVIEW OF SYSTEMS:    unable to obtatin fully due to mechanical ventailation and altered mental status       Physicial Exam:     HEENT: PERRLA, EOMI, no drainage or redness  Neck: No bruits;  No JVD  Respiratory: Breath Sounds equal & clear to percussion & auscultation, no accessory muscle use  Cardiovascular: Regular rate & rhythm, normal S1, S2; no murmurs, gallops or rubs; no S3, S4  Gastrointestinal: Soft, non-tender, non distended no hepatosplenomegaly, normal bowel sounds  Extremities: No peripheral edema, No cyanosis, clubbing   Vascular: Equal and normal pulses: 2+ peripheral pulses throughout  Neurological:  opens eyes to verbal stimuli, follows some simple commands localizes to pain   Musculoskeletal: No joint pain, swelling or deformity; no limitation of movement  Skin: No rashes

## 2018-04-30 DIAGNOSIS — E87.0 HYPEROSMOLALITY AND HYPERNATREMIA: ICD-10-CM

## 2018-04-30 LAB
CULTURE RESULTS: SIGNIFICANT CHANGE UP
CULTURE RESULTS: SIGNIFICANT CHANGE UP
SPECIMEN SOURCE: SIGNIFICANT CHANGE UP
SPECIMEN SOURCE: SIGNIFICANT CHANGE UP

## 2018-04-30 PROCEDURE — 99233 SBSQ HOSP IP/OBS HIGH 50: CPT

## 2018-04-30 PROCEDURE — 99291 CRITICAL CARE FIRST HOUR: CPT

## 2018-04-30 RX ORDER — LABETALOL HCL 100 MG
200 TABLET ORAL EVERY 8 HOURS
Qty: 0 | Refills: 0 | Status: DISCONTINUED | OUTPATIENT
Start: 2018-04-30 | End: 2018-05-11

## 2018-04-30 RX ORDER — PANTOPRAZOLE SODIUM 20 MG/1
40 TABLET, DELAYED RELEASE ORAL
Qty: 0 | Refills: 0 | Status: DISCONTINUED | OUTPATIENT
Start: 2018-04-30 | End: 2018-05-25

## 2018-04-30 RX ORDER — HYDRALAZINE HCL 50 MG
25 TABLET ORAL EVERY 8 HOURS
Qty: 0 | Refills: 0 | Status: DISCONTINUED | OUTPATIENT
Start: 2018-04-30 | End: 2018-05-02

## 2018-04-30 RX ORDER — FENTANYL CITRATE 50 UG/ML
50 INJECTION INTRAVENOUS ONCE
Qty: 0 | Refills: 0 | Status: DISCONTINUED | OUTPATIENT
Start: 2018-04-30 | End: 2018-04-30

## 2018-04-30 RX ADMIN — CHLORHEXIDINE GLUCONATE 15 MILLILITER(S): 213 SOLUTION TOPICAL at 18:02

## 2018-04-30 RX ADMIN — ENOXAPARIN SODIUM 40 MILLIGRAM(S): 100 INJECTION SUBCUTANEOUS at 12:41

## 2018-04-30 RX ADMIN — Medication 100 MILLIGRAM(S): at 12:41

## 2018-04-30 RX ADMIN — Medication 200 MILLIGRAM(S): at 14:00

## 2018-04-30 RX ADMIN — Medication 100 MILLIGRAM(S): at 06:05

## 2018-04-30 RX ADMIN — PANTOPRAZOLE SODIUM 40 MILLIGRAM(S): 20 TABLET, DELAYED RELEASE ORAL at 12:41

## 2018-04-30 RX ADMIN — Medication 25 MILLIGRAM(S): at 14:00

## 2018-04-30 RX ADMIN — Medication 10 MILLIGRAM(S): at 12:42

## 2018-04-30 RX ADMIN — Medication 25 MILLIGRAM(S): at 21:46

## 2018-04-30 RX ADMIN — Medication 1 APPLICATION(S): at 00:57

## 2018-04-30 RX ADMIN — Medication 1 APPLICATION(S): at 23:36

## 2018-04-30 RX ADMIN — CHLORHEXIDINE GLUCONATE 15 MILLILITER(S): 213 SOLUTION TOPICAL at 06:05

## 2018-04-30 RX ADMIN — AMLODIPINE BESYLATE 10 MILLIGRAM(S): 2.5 TABLET ORAL at 06:05

## 2018-04-30 RX ADMIN — NICARDIPINE HYDROCHLORIDE 25 MG/HR: 30 CAPSULE, EXTENDED RELEASE ORAL at 06:05

## 2018-04-30 RX ADMIN — Medication 1 APPLICATION(S): at 18:02

## 2018-04-30 RX ADMIN — Medication 200 MILLIGRAM(S): at 21:46

## 2018-04-30 RX ADMIN — Medication 1 MILLIGRAM(S): at 12:41

## 2018-04-30 RX ADMIN — FENTANYL CITRATE 50 MICROGRAM(S): 50 INJECTION INTRAVENOUS at 18:03

## 2018-04-30 RX ADMIN — SENNA PLUS 2 TABLET(S): 8.6 TABLET ORAL at 21:46

## 2018-04-30 RX ADMIN — Medication 1 APPLICATION(S): at 12:41

## 2018-04-30 RX ADMIN — FENTANYL CITRATE 50 MICROGRAM(S): 50 INJECTION INTRAVENOUS at 18:02

## 2018-04-30 RX ADMIN — Medication 1 APPLICATION(S): at 06:05

## 2018-04-30 NOTE — PROGRESS NOTE ADULT - SUBJECTIVE AND OBJECTIVE BOX
OVERNIGHT EVENTS:    BRIEF HOSPITAL COURSE:    Present Symptoms:     Dyspnea: 0 1 2 3   Nausea/Vomiting: Yes No  Anxiety:  Yes No  Depression: Yes No  Fatigue: Yes No  Loss of appetite: Yes No    Pain:             Character-            Duration-            Effect-            Factors-            Frequency-            Location-            Severity-    Review of Systems: Reviewed                     Negative:                     Positive:  Unable to obtain due to poor mentation   All others negative    MEDICATIONS  (STANDING):  amLODIPine   Tablet 10 milliGRAM(s) Oral daily  chlorhexidine 0.12% Liquid 15 milliLiter(s) Swish and Spit two times a day  enoxaparin Injectable 40 milliGRAM(s) SubCutaneous daily  folic acid 1 milliGRAM(s) Oral daily  labetalol 100 milliGRAM(s) Oral every 8 hours  niCARdipine Infusion 5 mG/Hr (25 mL/Hr) IV Continuous <Continuous>  pantoprazole  Injectable 40 milliGRAM(s) IV Push daily  petrolatum Ophthalmic Ointment 1 Application(s) Both EYES every 6 hours  senna 2 Tablet(s) Oral at bedtime  thiamine 100 milliGRAM(s) Oral daily    MEDICATIONS  (PRN):  acetaminophen    Suspension 650 milliGRAM(s) Oral every 6 hours PRN For Temp greater than 38 C (100.4 F)  hydrALAZINE Injectable 20 milliGRAM(s) IV Push every 6 hours PRN systolic > 150  labetalol Injectable 20 milliGRAM(s) IV Push every 2 hours PRN systolic > 150  lactulose Syrup 20 Gram(s) Oral every 6 hours PRN Constpation  polyethylene glycol 3350 17 Gram(s) Oral daily PRN Constipation      PHYSICAL EXAM:    Vital Signs Last 24 Hrs  T(C): 37.9 (30 Apr 2018 10:00), Max: 38.5 (30 Apr 2018 06:00)  T(F): 100.2 (30 Apr 2018 10:00), Max: 101.3 (30 Apr 2018 06:00)  HR: 85 (30 Apr 2018 10:00) (77 - 101)  BP: 131/81 (30 Apr 2018 10:00) (113/69 - 177/91)  BP(mean): 101 (30 Apr 2018 10:00) (85 - 121)  RR: 25 (30 Apr 2018 10:00) (20 - 43)  SpO2: 99% (30 Apr 2018 10:00) (96% - 100%)    General: alert  oriented x ____ lethargic agitated                  cachexia  nonverbal  coma    Karnofsky:  %    HEENT: normal  dry mouth  ET tube/trach    Lungs: comfortable tachypnea/labored breathing  excessive secretions    CV: normal  tachycardia    GI: normal  distended  tender  no BS               PEG/NG/OG tube  constipation  last BM:     : normal  incontinent  oliguria/anuria  eleazar    MSK: normal  weakness  edema             ambulatory  bedbound/wheelchair bound    Skin: normal  pressure ulcers- Stage_____  no rash    LABS:      04-29    146<H>  |  107  |  18.0  ----------------------------<  201<H>  3.8   |  27.0  |  0.47<L>    Ca    9.6      29 Apr 2018 07:21  Phos  3.1     04-29  Mg     2.4     04-29          I&O's Summary    29 Apr 2018 07:01  -  30 Apr 2018 07:00  --------------------------------------------------------  IN: 2654 mL / OUT: 1525 mL / NET: 1129 mL    30 Apr 2018 07:01  -  30 Apr 2018 10:22  --------------------------------------------------------  IN: 275 mL / OUT: 290 mL / NET: -15 mL        RADIOLOGY & ADDITIONAL STUDIES:    ADVANCE DIRECTIVES:   DNR YES NO  Completed on:                     MOLST  YES NO   Completed on:  Living Will  YES NO   Completed on: OVERNIGHT EVENTS: doing poorly, neurological exam seems to be worsening, no changes in imaging studies.     Present Symptoms:     Dyspnea: vented   Nausea/Vomiting: no   Anxiety:  unable   Depression: unable   Fatigue: unable   Loss of appetite: unable     Pain: none             Character-            Duration-            Effect-            Factors-            Frequency-            Location-            Severity-  Review of Systems: Reviewed              Unable to obtain due to poor mentation   All others negative    MEDICATIONS  (STANDING):  amLODIPine   Tablet 10 milliGRAM(s) Oral daily  chlorhexidine 0.12% Liquid 15 milliLiter(s) Swish and Spit two times a day  enoxaparin Injectable 40 milliGRAM(s) SubCutaneous daily  folic acid 1 milliGRAM(s) Oral daily  labetalol 100 milliGRAM(s) Oral every 8 hours  niCARdipine Infusion 5 mG/Hr (25 mL/Hr) IV Continuous <Continuous>  pantoprazole  Injectable 40 milliGRAM(s) IV Push daily  petrolatum Ophthalmic Ointment 1 Application(s) Both EYES every 6 hours  senna 2 Tablet(s) Oral at bedtime  thiamine 100 milliGRAM(s) Oral daily    MEDICATIONS  (PRN):  acetaminophen    Suspension 650 milliGRAM(s) Oral every 6 hours PRN For Temp greater than 38 C (100.4 F)  hydrALAZINE Injectable 20 milliGRAM(s) IV Push every 6 hours PRN systolic > 150  labetalol Injectable 20 milliGRAM(s) IV Push every 2 hours PRN systolic > 150  lactulose Syrup 20 Gram(s) Oral every 6 hours PRN Constpation  polyethylene glycol 3350 17 Gram(s) Oral daily PRN Constipation    PHYSICAL EXAM:    Vital Signs Last 24 Hrs  T(C): 37.9 (30 Apr 2018 10:00), Max: 38.5 (30 Apr 2018 06:00)  T(F): 100.2 (30 Apr 2018 10:00), Max: 101.3 (30 Apr 2018 06:00)  HR: 85 (30 Apr 2018 10:00) (77 - 101)  BP: 131/81 (30 Apr 2018 10:00) (113/69 - 177/91)  BP(mean): 101 (30 Apr 2018 10:00) (85 - 121)  RR: 25 (30 Apr 2018 10:00) (20 - 43)  SpO2: 99% (30 Apr 2018 10:00) (96% - 100%)    General: eyes open, not really meaningful interacting, not following commands.     Karnofsky:  10 %    HEENT: ET tube/trach    Lungs: comfortable     CV: normal      GI: NG    :  bush    MSK: weakness     Skin:  no rash    LABS:    04-29    146<H>  |  107  |  18.0  ----------------------------<  201<H>  3.8   |  27.0  |  0.47<L>    Ca    9.6      29 Apr 2018 07:21  Phos  3.1     04-29  Mg     2.4     04-29    I&O's Summary    29 Apr 2018 07:01  -  30 Apr 2018 07:00  --------------------------------------------------------  IN: 2654 mL / OUT: 1525 mL / NET: 1129 mL    30 Apr 2018 07:01 - 30 Apr 2018 10:22  --------------------------------------------------------  IN: 275 mL / OUT: 290 mL / NET: -15 mL    RADIOLOGY & ADDITIONAL STUDIES:    < from: CT Head No Cont (04.29.18 @ 13:44) >   unchanged small pontine hemorrhage.   Mildperiventricular white matter ischemia unchanged. mucosal thickening and secretions in the   BILATERAL maxillary and ethmoid sinuses    < from: CT Head No Cont (04.28.18 @ 21:49) >  unchanged focal hemorrhage in the posterior immanuel with extension into the fourth ventricle. Mild periventricular white matter ischemia is noted        ADVANCE DIRECTIVES: Full Code

## 2018-04-30 NOTE — PROGRESS NOTE ADULT - ASSESSMENT
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     1) ICH  CT stable. OK for DVT prophylaxis lovenox.   Avoid antiplatelet.   Continue blood pressure control.   Supportive care/vent management.  Overall prognosis poor.   Palliative care team involved.     2) Hypertension  Continue blood pressure control.   Remains on Nicardipine drip.     Case discussed with Dr Anton.

## 2018-04-30 NOTE — PROGRESS NOTE ADULT - SUBJECTIVE AND OBJECTIVE BOX
[facility]                                        Neurology at [place]                                 Rima Whiteside, & Saad                                  [address]                                        [place], [place], [address]                                             [phone]        CC: pontine intracranial hemorrhage     HPI:   63y Female who c/o dizziness and then collapsed at home.  She had poor mental status and was intubated in the ED. There was no associated seizure activity.   She was found to have a large brainstem intracranial hemorrhage on CT.     She has remained on mechanical ventilator since admission.     Interim history:  She has remained on mechanical ventilator since yesterday and is currently unresponsive to voice.     ROS:   Unobtainable due to patient's condition.     MEDICATIONS  (STANDING):  amLODIPine   Tablet 10 milliGRAM(s) Oral daily  chlorhexidine 0.12% Liquid 15 milliLiter(s) Swish and Spit two times a day  enoxaparin Injectable 40 milliGRAM(s) SubCutaneous daily  folic acid 1 milliGRAM(s) Oral daily  labetalol 100 milliGRAM(s) Oral every 8 hours  niCARdipine Infusion 5 mG/Hr (25 mL/Hr) IV Continuous <Continuous>  pantoprazole  Injectable 40 milliGRAM(s) IV Push daily  petrolatum Ophthalmic Ointment 1 Application(s) Both EYES every 6 hours  senna 2 Tablet(s) Oral at bedtime  thiamine 100 milliGRAM(s) Oral daily      Vital Signs Last 24 Hrs  T(F): 100.2 (30 Apr 2018 10:00), Max: 101.3 (30 Apr 2018 06:00)  HR: 85 (30 Apr 2018 10:00) (77 - 101)  BP: 131/81 (30 Apr 2018 10:00) (113/69 - 177/91)  BP(mean): 101 (30 Apr 2018 10:00) (85 - 121)  RR: 25 (30 Apr 2018 10:00) (20 - 43)  SpO2: 99% (30 Apr 2018 10:00) (96% - 100%)    Detailed Neurologic Exam:    Mental status: Eyes open but not responding to voice. Some roving eye movements.   intermittently tracks with gaze.     Cranial nerves: Pupils pinpoint. There is slight blink to threat. There is roving eye movement in the horizontal plane. Corneal reflexes absent. Palate and tongue cannot be assessed.     Motor/Sensory:  There is normal bulk and tone.  There is no tremor.  There is extensor posturing bilaterally to stimuli.     Reflexes: trace throughout and plantar responses are extensor bilaterally with some triple flexion to plantar stimulation.    Cerebellar: Cannot be assessed.    Labs:     04-29    146<H>  |  107  |  18.0  ----------------------------<  201<H>  3.8   |  27.0  |  0.47<L>    Ca    9.6      29 Apr 2018 07:21  Phos  3.1     04-29  Mg     2.4     04-29      Rad: (1 point for report; 2 points for imaging review; 1 point for d/w radiologist)  CT head 4/29/18 images reviewed (and concur with report): There is pontine intracranial hemorrhage which is unchanged. [facility]                                        Neu[facility] at [place]                                 Rima Whiteside, & Saad                                  [address]                                        [place], [address]                                             [phone]        CC: pontine intracranial hemorrhage     HPI:   63y Female who c/o dizziness and then collapsed at home.  She had poor mental status and was intubated in the ED. There was no associated seizure activity.   She was found to have a large brainstem intracranial hemorrhage on CT.     She has remained on mechanical ventilator since admission.     Interim history:  She has remained on mechanical ventilator since yesterday and is currently unresponsive to voice.     ROS:   Unobtainable due to patient's condition.     MEDICATIONS  (STANDING):  amLODIPine   Tablet 10 milliGRAM(s) Oral daily  chlorhexidine 0.12% Liquid 15 milliLiter(s) Swish and Spit two times a day  enoxaparin Injectable 40 milliGRAM(s) SubCutaneous daily  folic acid 1 milliGRAM(s) Oral daily  labetalol 100 milliGRAM(s) Oral every 8 hours  niCARdipine Infusion 5 mG/Hr (25 mL/Hr) IV Continuous <Continuous>  pantoprazole  Injectable 40 milliGRAM(s) IV Push daily  petrolatum Ophthalmic Ointment 1 Application(s) Both EYES every 6 hours  senna 2 Tablet(s) Oral at bedtime  thiamine 100 milliGRAM(s) Oral daily      Vital Signs Last 24 Hrs  T(F): 100.2 (30 Apr 2018 10:00), Max: 101.3 (30 Apr 2018 06:00)  HR: 85 (30 Apr 2018 10:00) (77 - 101)  BP: 131/81 (30 Apr 2018 10:00) (113/69 - 177/91)  BP(mean): 101 (30 Apr 2018 10:00) (85 - 121)  RR: 25 (30 Apr 2018 10:00) (20 - 43)  SpO2: 99% (30 Apr 2018 10:00) (96% - 100%)    Detailed Neurologic Exam:    Mental status: Eyes open but not responding to voice. Some roving eye movements.   intermittently tracks with gaze.     Cranial nerves: Pupils pinpoint. There is slight blink to threat. There is roving eye movement in the horizontal plane. Corneal reflexes absent. Palate and tongue cannot be assessed.     Motor/Sensory:  There is normal bulk and tone.  There is no tremor.  There is extensor posturing bilaterally to stimuli.     Reflexes: trace throughout and plantar responses are extensor bilaterally with some triple flexion to plantar stimulation.    Cerebellar: Cannot be assessed.    Labs:     04-29    146<H>  |  107  |  18.0  ----------------------------<  201<H>  3.8   |  27.0  |  0.47<L>    Ca    9.6      29 Apr 2018 07:21  Phos  3.1     04-29  Mg     2.4     04-29      Rad:   CT head 4/29/18 images reviewed (and concur with report): There is pontine intracranial hemorrhage which is unchanged.

## 2018-04-30 NOTE — PROGRESS NOTE ADULT - PROBLEM SELECTOR PLAN 2
maintained SBT until afternoon= pt was with high RR 45, switched back to AC  pt's neurologic state precludes her from extubation as she would likely not be able to maintain patent airway

## 2018-04-30 NOTE — PROGRESS NOTE ADULT - SUBJECTIVE AND OBJECTIVE BOX
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, respiratory failure, requiring cardene drip for BP control    Events last 24 hours: resp distress on SBT, placed on AC     PAST MEDICAL & SURGICAL HISTORY:  Alcohol abuse  No significant past surgical history      Review of Systems:  Cannot be assessed pt intubated, encephalopathic     Medications:    amLODIPine   Tablet 10 milliGRAM(s) Oral daily  hydrALAZINE 25 milliGRAM(s) Oral every 8 hours  hydrALAZINE Injectable 20 milliGRAM(s) IV Push every 6 hours PRN  labetalol 200 milliGRAM(s) Oral every 8 hours  labetalol Injectable 20 milliGRAM(s) IV Push every 2 hours PRN  niCARdipine Infusion 5 mG/Hr IV Continuous <Continuous>      acetaminophen    Suspension 650 milliGRAM(s) Oral every 6 hours PRN      enoxaparin Injectable 40 milliGRAM(s) SubCutaneous daily    bisacodyl Suppository 10 milliGRAM(s) Rectal daily PRN  lactulose Syrup 20 Gram(s) Oral every 6 hours PRN  pantoprazole   Suspension 40 milliGRAM(s) Oral before lunch  polyethylene glycol 3350 17 Gram(s) Oral daily PRN  senna 2 Tablet(s) Oral at bedtime        folic acid 1 milliGRAM(s) Oral daily  thiamine 100 milliGRAM(s) Oral daily      chlorhexidine 0.12% Liquid 15 milliLiter(s) Swish and Spit two times a day  petrolatum Ophthalmic Ointment 1 Application(s) Both EYES every 6 hours        Mode: AC/ CMV (Assist Control/ Continuous Mandatory Ventilation)  RR (machine): 20  TV (machine): 400  FiO2: 35  PEEP: 5  MAP: 14  PIP: 29      ICU Vital Signs Last 24 Hrs  T(C): 37 (30 Apr 2018 15:00), Max: 38.5 (30 Apr 2018 06:00)  T(F): 98.6 (30 Apr 2018 15:00), Max: 101.3 (30 Apr 2018 06:00)  HR: 79 (30 Apr 2018 17:08) (65 - 101)  BP: 106/57 (30 Apr 2018 15:00) (106/57 - 177/91)  BP(mean): 76 (30 Apr 2018 15:00) (76 - 121)  ABP: --  ABP(mean): --  RR: 35 (30 Apr 2018 15:00) (21 - 43)  SpO2: 95% (30 Apr 2018 17:08) (94% - 100%)          I&O's Detail    29 Apr 2018 07:01  -  30 Apr 2018 07:00  --------------------------------------------------------  IN:    Enteral Tube Flush: 950 mL    Jevity: 1410 mL    niCARdipine Infusion: 294 mL  Total IN: 2654 mL    OUT:    Indwelling Catheter - Urethral: 1525 mL  Total OUT: 1525 mL    Total NET: 1129 mL      30 Apr 2018 07:01  -  30 Apr 2018 17:26  --------------------------------------------------------  IN:    Jevity: 420 mL    niCARdipine Infusion: 35 mL  Total IN: 455 mL    OUT:    Indwelling Catheter - Urethral: 635 mL  Total OUT: 635 mL    Total NET: -180 mL            LABS:    04-29    146<H>  |  107  |  18.0  ----------------------------<  201<H>  3.8   |  27.0  |  0.47<L>    Ca    9.6      29 Apr 2018 07:21  Phos  3.1     04-29  Mg     2.4     04-29            CAPILLARY BLOOD GLUCOSE            CULTURES:  Culture Results:   No growth at 48 hours (04-25 @ 17:45)  Culture Results:   No growth at 48 hours (04-25 @ 17:44)  Culture Results:   No growth at 5 days. (04-24 @ 01:07)  Culture Results:   No growth at 5 days. (04-24 @ 01:07)  Culture Results:   No growth (04-24 @ 01:06)      Physical Examination:    General: No acute distress.      HEENT: Pupils equal, reactive to light.  Symmetric. edematous tongue protruding from mouth    PULM: Clear to auscultation bilaterally, no significant sputum production    CVS: Regular rate and rhythm, no murmurs, rubs, or gallops    ABD: Soft, nondistended, nontender, normoactive bowel sounds, no masses    EXT: anasarca     SKIN: Warm and well perfused, no rashes noted, pallor    NEURO: eyes opened to tactile stim, tracked to voice and nodded head appropriately to questions, flaccid x 4 extr    RADIOLOGY:   < from: CT Head No Cont (04.29.18 @ 13:44) >    TECHNIQUE: Contiguous axial 5 mm sections were obtained through the head.   Sagittal and coronal 2-D reformatted images were also obtained.   This   scan was performed using automatic exposure control (radiation dose   reduction software) to obtain a diagnostic image quality scan with   patient dose as low as reasonably achievable.     FINDINGS:   CT dated 4/28/2018 available for review.    The brain demonstrates unchanged small pontine hemorrhage.   Mild   periventricular white matter ischemia unchanged. No acute cerebral   cortical infarct is seen.  No mass effect is found in the brain.      The ventricles, sulci and basal cisterns appear unremarkable.         The orbits are unremarkable.  The paranasal sinuses are significant for   mucosal thickening and secretions in the BILATERAL maxillary and ethmoid   sinuses. ET tube is noted.  The nasal cavity appears intact.  The   nasopharynx is symmetric.  The central skull base, petrous temporal bones   and calvarium remain intact.      IMPRESSION:   unchanged small pontine hemorrhage.   Mildperiventricular   white matter ischemia unchanged. mucosal thickening and secretions in the   BILATERAL maxillary and ethmoid sinuses. ET tube is noted.                    THO DELONG M.D., ATTENDING RADIOLOGIST  This document has been electronically signed. Apr 29 2018  1:54PM        < end of copied text >    CRITICAL CARE TIME SPENT: 45

## 2018-04-30 NOTE — PROGRESS NOTE ADULT - ATTENDING COMMENTS
Thank you for the opportunity to assist with the care of this patient.   [facility] [phone].

## 2018-04-30 NOTE — PROGRESS NOTE ADULT - PROBLEM SELECTOR PLAN 1
- very poor overall prognosis, neurological exam seems to be worsening. no changes on imaging studies

## 2018-04-30 NOTE — PROGRESS NOTE ADULT - PROBLEM SELECTOR PLAN 1
titrate off cardene if blood pressure tolerates  uptitrated Labetolol  to 200mg q 8  added hydralazine 25mg q 8

## 2018-05-01 DIAGNOSIS — Z71.89 OTHER SPECIFIED COUNSELING: ICD-10-CM

## 2018-05-01 DIAGNOSIS — I10 ESSENTIAL (PRIMARY) HYPERTENSION: ICD-10-CM

## 2018-05-01 LAB
ANION GAP SERPL CALC-SCNC: 15 MMOL/L — SIGNIFICANT CHANGE UP (ref 5–17)
BUN SERPL-MCNC: 21 MG/DL — HIGH (ref 8–20)
CALCIUM SERPL-MCNC: 8.9 MG/DL — SIGNIFICANT CHANGE UP (ref 8.6–10.2)
CHLORIDE SERPL-SCNC: 107 MMOL/L — SIGNIFICANT CHANGE UP (ref 98–107)
CO2 SERPL-SCNC: 26 MMOL/L — SIGNIFICANT CHANGE UP (ref 22–29)
CREAT SERPL-MCNC: 0.32 MG/DL — LOW (ref 0.5–1.3)
EOSINOPHIL # BLD AUTO: 0.1 K/UL — SIGNIFICANT CHANGE UP (ref 0–0.5)
EOSINOPHIL NFR BLD AUTO: 1 % — SIGNIFICANT CHANGE UP (ref 0–5)
GLUCOSE SERPL-MCNC: 184 MG/DL — HIGH (ref 70–115)
HCT VFR BLD CALC: 35.4 % — LOW (ref 37–47)
HGB BLD-MCNC: 11.5 G/DL — LOW (ref 12–16)
LYMPHOCYTES # BLD AUTO: 1.3 K/UL — SIGNIFICANT CHANGE UP (ref 1–4.8)
LYMPHOCYTES # BLD AUTO: 11 % — LOW (ref 20–55)
MAGNESIUM SERPL-MCNC: 2.2 MG/DL — SIGNIFICANT CHANGE UP (ref 1.6–2.6)
MCHC RBC-ENTMCNC: 32 PG — HIGH (ref 27–31)
MCHC RBC-ENTMCNC: 32.5 G/DL — SIGNIFICANT CHANGE UP (ref 32–36)
MCV RBC AUTO: 98.6 FL — SIGNIFICANT CHANGE UP (ref 81–99)
MONOCYTES # BLD AUTO: 0.9 K/UL — HIGH (ref 0–0.8)
MONOCYTES NFR BLD AUTO: 8 % — SIGNIFICANT CHANGE UP (ref 3–10)
NEUTROPHILS # BLD AUTO: 9.2 K/UL — HIGH (ref 1.8–8)
NEUTROPHILS NFR BLD AUTO: 73 % — SIGNIFICANT CHANGE UP (ref 37–73)
NEUTS BAND # BLD: 7 % — SIGNIFICANT CHANGE UP (ref 0–8)
PHOSPHATE SERPL-MCNC: 3.7 MG/DL — SIGNIFICANT CHANGE UP (ref 2.4–4.7)
PLAT MORPH BLD: NORMAL — SIGNIFICANT CHANGE UP
PLATELET # BLD AUTO: 300 K/UL — SIGNIFICANT CHANGE UP (ref 150–400)
POTASSIUM SERPL-MCNC: 3.7 MMOL/L — SIGNIFICANT CHANGE UP (ref 3.5–5.3)
POTASSIUM SERPL-SCNC: 3.7 MMOL/L — SIGNIFICANT CHANGE UP (ref 3.5–5.3)
RBC # BLD: 3.59 M/UL — LOW (ref 4.4–5.2)
RBC # FLD: 14.2 % — SIGNIFICANT CHANGE UP (ref 11–15.6)
RBC BLD AUTO: SIGNIFICANT CHANGE UP
SODIUM SERPL-SCNC: 148 MMOL/L — HIGH (ref 135–145)
WBC # BLD: 11.6 K/UL — HIGH (ref 4.8–10.8)
WBC # FLD AUTO: 11.6 K/UL — HIGH (ref 4.8–10.8)

## 2018-05-01 PROCEDURE — 99291 CRITICAL CARE FIRST HOUR: CPT

## 2018-05-01 PROCEDURE — 99233 SBSQ HOSP IP/OBS HIGH 50: CPT

## 2018-05-01 PROCEDURE — 99232 SBSQ HOSP IP/OBS MODERATE 35: CPT

## 2018-05-01 RX ORDER — POTASSIUM CHLORIDE 20 MEQ
40 PACKET (EA) ORAL ONCE
Qty: 0 | Refills: 0 | Status: COMPLETED | OUTPATIENT
Start: 2018-05-01 | End: 2018-05-01

## 2018-05-01 RX ORDER — MIDAZOLAM HYDROCHLORIDE 1 MG/ML
2 INJECTION, SOLUTION INTRAMUSCULAR; INTRAVENOUS ONCE
Qty: 0 | Refills: 0 | Status: DISCONTINUED | OUTPATIENT
Start: 2018-05-01 | End: 2018-05-01

## 2018-05-01 RX ADMIN — Medication 1 MILLIGRAM(S): at 11:52

## 2018-05-01 RX ADMIN — ENOXAPARIN SODIUM 40 MILLIGRAM(S): 100 INJECTION SUBCUTANEOUS at 11:51

## 2018-05-01 RX ADMIN — Medication 100 MILLIGRAM(S): at 11:52

## 2018-05-01 RX ADMIN — Medication 1 APPLICATION(S): at 23:25

## 2018-05-01 RX ADMIN — Medication 1 APPLICATION(S): at 11:52

## 2018-05-01 RX ADMIN — Medication 25 MILLIGRAM(S): at 15:12

## 2018-05-01 RX ADMIN — MIDAZOLAM HYDROCHLORIDE 2 MILLIGRAM(S): 1 INJECTION, SOLUTION INTRAMUSCULAR; INTRAVENOUS at 22:00

## 2018-05-01 RX ADMIN — CHLORHEXIDINE GLUCONATE 15 MILLILITER(S): 213 SOLUTION TOPICAL at 18:28

## 2018-05-01 RX ADMIN — Medication 200 MILLIGRAM(S): at 21:16

## 2018-05-01 RX ADMIN — Medication 25 MILLIGRAM(S): at 05:44

## 2018-05-01 RX ADMIN — CHLORHEXIDINE GLUCONATE 15 MILLILITER(S): 213 SOLUTION TOPICAL at 05:44

## 2018-05-01 RX ADMIN — SENNA PLUS 2 TABLET(S): 8.6 TABLET ORAL at 21:16

## 2018-05-01 RX ADMIN — Medication 40 MILLIEQUIVALENT(S): at 11:51

## 2018-05-01 RX ADMIN — Medication 25 MILLIGRAM(S): at 21:16

## 2018-05-01 RX ADMIN — Medication 200 MILLIGRAM(S): at 15:12

## 2018-05-01 RX ADMIN — Medication 1 APPLICATION(S): at 18:28

## 2018-05-01 RX ADMIN — AMLODIPINE BESYLATE 10 MILLIGRAM(S): 2.5 TABLET ORAL at 05:44

## 2018-05-01 RX ADMIN — LACTULOSE 20 GRAM(S): 10 SOLUTION ORAL at 05:49

## 2018-05-01 RX ADMIN — Medication 1 APPLICATION(S): at 05:44

## 2018-05-01 RX ADMIN — Medication 10 MILLIGRAM(S): at 11:51

## 2018-05-01 RX ADMIN — Medication 200 MILLIGRAM(S): at 05:44

## 2018-05-01 RX ADMIN — PANTOPRAZOLE SODIUM 40 MILLIGRAM(S): 20 TABLET, DELAYED RELEASE ORAL at 11:52

## 2018-05-01 NOTE — PROGRESS NOTE ADULT - ATTENDING COMMENTS
Thank you for the opportunity to assist with the care of this patient.   [facility] [phone].

## 2018-05-01 NOTE — PROGRESS NOTE ADULT - PROBLEM SELECTOR PLAN 7
father and sisters came from [place] today, attempting to arrange family meeting tomorrow with  and all parties involved

## 2018-05-01 NOTE — PROGRESS NOTE ADULT - ASSESSMENT
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     1) ICH  Avoid antiplatelet.   Continue blood pressure control.   Supportive care/vent management.  Overall prognosis poor.   Case discussed with Dr Colón. Patient's condition appears to be deteriorating slowly.     2) Hypertension  Continue blood pressure control.

## 2018-05-01 NOTE — PROGRESS NOTE ADULT - PROBLEM SELECTOR PLAN 4
-rest of her family arrived from [place] today, spoke with patient's father and 3 sisters. They know Capo is the ultimate decision maker. I explained to them briefly what out last conversation was with Capo and other sister of patient Felicity on Friday and the options at this point. I expressed to them the most important thing at this point is to keep Jodee at the center of any decisions, and that the decisions they make as a family should be based off what they would think she would want. They understand. Trying to get everyone together for a family meeting tomorrow to discuss next steps before these folks have to go back to [place] (they are leaving on Thursday).

## 2018-05-01 NOTE — PROGRESS NOTE ADULT - SUBJECTIVE AND OBJECTIVE BOX
OVERNIGHT EVENTS:     Present Symptoms:     Dyspnea: 0 1 2 3   Nausea/Vomiting: Yes No  Anxiety:  Yes No  Depression: Yes No  Fatigue: Yes No  Loss of appetite: Yes No    Pain:             Character-            Duration-            Effect-            Factors-            Frequency-            Location-            Severity-    Review of Systems: Reviewed                     Negative:                     Positive:  Unable to obtain due to poor mentation   All others negative    MEDICATIONS  (STANDING):  amLODIPine   Tablet 10 milliGRAM(s) Oral daily  bisacodyl Suppository 10 milliGRAM(s) Rectal daily  chlorhexidine 0.12% Liquid 15 milliLiter(s) Swish and Spit two times a day  enoxaparin Injectable 40 milliGRAM(s) SubCutaneous daily  folic acid 1 milliGRAM(s) Oral daily  hydrALAZINE 25 milliGRAM(s) Oral every 8 hours  labetalol 200 milliGRAM(s) Oral every 8 hours  pantoprazole   Suspension 40 milliGRAM(s) Oral before lunch  petrolatum Ophthalmic Ointment 1 Application(s) Both EYES every 6 hours  potassium chloride   Powder 40 milliEquivalent(s) Oral once  senna 2 Tablet(s) Oral at bedtime  thiamine 100 milliGRAM(s) Oral daily    MEDICATIONS  (PRN):  acetaminophen    Suspension 650 milliGRAM(s) Oral every 6 hours PRN For Temp greater than 38 C (100.4 F)  hydrALAZINE Injectable 20 milliGRAM(s) IV Push every 6 hours PRN systolic > 150  labetalol Injectable 20 milliGRAM(s) IV Push every 2 hours PRN systolic > 150  lactulose Syrup 20 Gram(s) Oral every 6 hours PRN Constpation  polyethylene glycol 3350 17 Gram(s) Oral daily PRN Constipation    PHYSICAL EXAM:    Vital Signs Last 24 Hrs  T(C): 38 (01 May 2018 10:00), Max: 38 (01 May 2018 10:00)  T(F): 100.4 (01 May 2018 10:00), Max: 100.4 (01 May 2018 10:00)  HR: 85 (01 May 2018 10:00) (65 - 87)  BP: 117/77 (01 May 2018 10:00) (106/57 - 160/74)  BP(mean): 93 (01 May 2018 10:00) (76 - 109)  RR: 34 (01 May 2018 10:00) (24 - 46)  SpO2: 100% (01 May 2018 10:00) (94% - 100%)    General: alert  oriented x ____ lethargic agitated                  cachexia  nonverbal  coma    Karnofsky:  %    HEENT: normal  dry mouth  ET tube/trach    Lungs: comfortable tachypnea/labored breathing  excessive secretions    CV: normal  tachycardia    GI: normal  distended  tender  no BS               PEG/NG/OG tube  constipation  last BM:     : normal  incontinent  oliguria/anuria  bush    MSK: normal  weakness  edema             ambulatory  bedbound/wheelchair bound    Skin: normal  pressure ulcers- Stage_____  no rash    LABS:                      11.5   11.6  )-----------( 300      ( 01 May 2018 06:01 )             35.4     05-01    148<H>  |  107  |  21.0<H>  ----------------------------<  184<H>  3.7   |  26.0  |  0.32<L>    Ca    8.9      01 May 2018 06:01  Phos  3.7     05-01  Mg     2.2     05-01    I&O's Summary    30 Apr 2018 07:01  -  01 May 2018 07:00  --------------------------------------------------------  IN: 1920 mL / OUT: 1765 mL / NET: 155 mL    01 May 2018 07:01  -  01 May 2018 10:47  --------------------------------------------------------  IN: 120 mL / OUT: 145 mL / NET: -25 mL    RADIOLOGY & ADDITIONAL STUDIES:    ADVANCE DIRECTIVES:   DNR YES NO  Completed on:                     MOLST  YES NO   Completed on:  Living Will  YES NO   Completed on:

## 2018-05-01 NOTE — PROGRESS NOTE ADULT - PROBLEM SELECTOR PLAN 3
- seems to be deteriorating day by day. less responsive, seems to be having some jerking movements more pronounced today.

## 2018-05-01 NOTE — PROGRESS NOTE ADULT - SUBJECTIVE AND OBJECTIVE BOX
[facility]                                        [facility] [place]                                 [facility]                                  [address]                                        [place], [place], [address]                                             [phone]        CC: pontine intracranial hemorrhage     HPI:   63y Female who c/o dizziness and then collapsed at home.  She had poor mental status and was intubated in the ED. There was no associated seizure activity.   She was found to have a large brainstem intracranial hemorrhage on CT.     She has remained on mechanical ventilator since admission.     Interim history:  She has remained on mechanical ventilator since yesterday and remains unresponsive to voice.     ROS:   Unobtainable due to patient's condition.     MEDICATIONS  (STANDING):  amLODIPine   Tablet 10 milliGRAM(s) Oral daily  bisacodyl Suppository 10 milliGRAM(s) Rectal daily  chlorhexidine 0.12% Liquid 15 milliLiter(s) Swish and Spit two times a day  enoxaparin Injectable 40 milliGRAM(s) SubCutaneous daily  folic acid 1 milliGRAM(s) Oral daily  hydrALAZINE 25 milliGRAM(s) Oral every 8 hours  labetalol 200 milliGRAM(s) Oral every 8 hours  pantoprazole   Suspension 40 milliGRAM(s) Oral before lunch  petrolatum Ophthalmic Ointment 1 Application(s) Both EYES every 6 hours  potassium chloride   Powder 40 milliEquivalent(s) Oral once  senna 2 Tablet(s) Oral at bedtime  thiamine 100 milliGRAM(s) Oral daily      Vital Signs Last 24 Hrs  T(F): 100.4 (01 May 2018 10:00), Max: 100.4 (01 May 2018 10:00)  HR: 85 (01 May 2018 10:00) (65 - 87)  BP: 117/77 (01 May 2018 10:00) (106/57 - 149/81)  BP(mean): 93 (01 May 2018 10:00) (76 - 109)  RR: 34 (01 May 2018 10:00) (24 - 46)  SpO2: 100% (01 May 2018 10:00) (94% - 100%)    Detailed Neurologic Exam:    Mental status: Eyes open but not responding to voice. Some roving eye movements.   Not tracking with gaze today.     Cranial nerves: Pupils pinpoint. There is slight blink to threat. There is roving eye movement in the horizontal plane. Corneal reflexes absent. Palate and tongue cannot be assessed.     Motor/Sensory:  There is normal bulk and tone.  There is no tremor.  There is minimal extensor posturing bilaterally to stimuli.     Reflexes: trace throughout and plantar responses are extensor bilaterally with some weak triple flexion to plantar stimulation.    Cerebellar: Cannot be assessed.    Labs:     05-01    148<H>  |  107  |  21.0<H>  ----------------------------<  184<H>  3.7   |  26.0  |  0.32<L>    Ca    8.9      01 May 2018 06:01  Phos  3.7     05-01  Mg     2.2     05-01                          11.5   11.6  )-----------( 300      ( 01 May 2018 06:01 )             35.4       CT head 4/29/18 showed pontine intracranial hemorrhage which is unchanged.

## 2018-05-01 NOTE — PROGRESS NOTE ADULT - PROBLEM SELECTOR PLAN 4
likely due to stroke  repeat imaging as noted above off cardene drip   continue labetolol and hydralazine

## 2018-05-01 NOTE — PROGRESS NOTE ADULT - PROBLEM SELECTOR PLAN 1
- very poor prognosis for her ability to protect her airway and clear secretions given poor mental status. family still deciding regarding trach

## 2018-05-01 NOTE — PROGRESS NOTE ADULT - SUBJECTIVE AND OBJECTIVE BOX
Patient is a 63y old  Female who presents with a chief complaint of complained of dizzyness and collapsed at home (23 Apr 2018 10:59)      BRIEF HOSPITAL COURSE: ***    Events last 24 hours: episode of resp distress on SBT yesterday afternoon, increased secretions, back on SBT today.    PAST MEDICAL & SURGICAL HISTORY:  Alcohol abuse  No significant past surgical history      Review of Systems:  Cannot be obtained pt intubated      Medications:    amLODIPine   Tablet 10 milliGRAM(s) Oral daily  hydrALAZINE 25 milliGRAM(s) Oral every 8 hours  hydrALAZINE Injectable 20 milliGRAM(s) IV Push every 6 hours PRN  labetalol 200 milliGRAM(s) Oral every 8 hours  labetalol Injectable 20 milliGRAM(s) IV Push every 2 hours PRN      acetaminophen    Suspension 650 milliGRAM(s) Oral every 6 hours PRN      enoxaparin Injectable 40 milliGRAM(s) SubCutaneous daily    bisacodyl Suppository 10 milliGRAM(s) Rectal daily  lactulose Syrup 20 Gram(s) Oral every 6 hours PRN  pantoprazole   Suspension 40 milliGRAM(s) Oral before lunch  polyethylene glycol 3350 17 Gram(s) Oral daily PRN  senna 2 Tablet(s) Oral at bedtime        folic acid 1 milliGRAM(s) Oral daily  thiamine 100 milliGRAM(s) Oral daily      chlorhexidine 0.12% Liquid 15 milliLiter(s) Swish and Spit two times a day  petrolatum Ophthalmic Ointment 1 Application(s) Both EYES every 6 hours        Mode: CPAP with PS  FiO2: 30  PEEP: 5  PS: 10  MAP: 9      ICU Vital Signs Last 24 Hrs  T(C): 38 (01 May 2018 10:00), Max: 38 (01 May 2018 10:00)  T(F): 100.4 (01 May 2018 10:00), Max: 100.4 (01 May 2018 10:00)  HR: 85 (01 May 2018 13:00) (65 - 89)  BP: 117/59 (01 May 2018 13:00) (106/57 - 148/81)  BP(mean): 82 (01 May 2018 13:00) (76 - 108)  ABP: --  ABP(mean): --  RR: 29 (01 May 2018 13:00) (24 - 40)  SpO2: 100% (01 May 2018 13:00) (94% - 100%)          I&O's Detail    30 Apr 2018 07:01  -  01 May 2018 07:00  --------------------------------------------------------  IN:    Enteral Tube Flush: 400 mL    Jevity: 1440 mL    niCARdipine Infusion: 80 mL  Total IN: 1920 mL    OUT:    Indwelling Catheter - Urethral: 1765 mL  Total OUT: 1765 mL    Total NET: 155 mL      01 May 2018 07:01  -  01 May 2018 14:28  --------------------------------------------------------  IN:    Jevity: 120 mL  Total IN: 120 mL    OUT:    Indwelling Catheter - Urethral: 305 mL  Total OUT: 305 mL    Total NET: -185 mL            LABS:                        11.5   11.6  )-----------( 300      ( 01 May 2018 06:01 )             35.4     05-01    148<H>  |  107  |  21.0<H>  ----------------------------<  184<H>  3.7   |  26.0  |  0.32<L>    Ca    8.9      01 May 2018 06:01  Phos  3.7     05-01  Mg     2.2     05-01            CAPILLARY BLOOD GLUCOSE            CULTURES:  Culture Results:   No growth at 5 days. (04-25 @ 17:45)  Culture Results:   No growth at 5 days. (04-25 @ 17:44)      Physical Examination:    General: No acute distress.      HEENT: Pupils equal, reactive to light.  Symmetric.    PULM: Clear to auscultation bilaterally, no significant sputum production    CVS: Regular rate and rhythm, no murmurs, rubs, or gallops    ABD: Soft, nondistended, nontender, normoactive bowel sounds, no masses    EXT: No edema, nontender    SKIN: Warm and well perfused, no rashes noted.    NEURO:     RADIOLOGY:     CRITICAL CARE TIME SPENT: 45 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, respiratory failure, requiring cardene drip for BP control. Cardene weaned off last night.     Events last 24 hours: episode of resp distress on SBT yesterday afternoon, increased secretions, back on SBT today.    PAST MEDICAL & SURGICAL HISTORY:  Alcohol abuse  No significant past surgical history      Review of Systems:  Cannot be obtained pt intubated      Medications:    amLODIPine   Tablet 10 milliGRAM(s) Oral daily  hydrALAZINE 25 milliGRAM(s) Oral every 8 hours  hydrALAZINE Injectable 20 milliGRAM(s) IV Push every 6 hours PRN  labetalol 200 milliGRAM(s) Oral every 8 hours  labetalol Injectable 20 milliGRAM(s) IV Push every 2 hours PRN      acetaminophen    Suspension 650 milliGRAM(s) Oral every 6 hours PRN      enoxaparin Injectable 40 milliGRAM(s) SubCutaneous daily    bisacodyl Suppository 10 milliGRAM(s) Rectal daily  lactulose Syrup 20 Gram(s) Oral every 6 hours PRN  pantoprazole   Suspension 40 milliGRAM(s) Oral before lunch  polyethylene glycol 3350 17 Gram(s) Oral daily PRN  senna 2 Tablet(s) Oral at bedtime        folic acid 1 milliGRAM(s) Oral daily  thiamine 100 milliGRAM(s) Oral daily      chlorhexidine 0.12% Liquid 15 milliLiter(s) Swish and Spit two times a day  petrolatum Ophthalmic Ointment 1 Application(s) Both EYES every 6 hours        Mode: CPAP with PS  FiO2: 30  PEEP: 5  PS: 10  MAP: 9      ICU Vital Signs Last 24 Hrs  T(C): 38 (01 May 2018 10:00), Max: 38 (01 May 2018 10:00)  T(F): 100.4 (01 May 2018 10:00), Max: 100.4 (01 May 2018 10:00)  HR: 85 (01 May 2018 13:00) (65 - 89)  BP: 117/59 (01 May 2018 13:00) (106/57 - 148/81)  BP(mean): 82 (01 May 2018 13:00) (76 - 108)  ABP: --  ABP(mean): --  RR: 29 (01 May 2018 13:00) (24 - 40)  SpO2: 100% (01 May 2018 13:00) (94% - 100%)          I&O's Detail    30 Apr 2018 07:01  -  01 May 2018 07:00  --------------------------------------------------------  IN:    Enteral Tube Flush: 400 mL    Jevity: 1440 mL    niCARdipine Infusion: 80 mL  Total IN: 1920 mL    OUT:    Indwelling Catheter - Urethral: 1765 mL  Total OUT: 1765 mL    Total NET: 155 mL      01 May 2018 07:01  -  01 May 2018 14:28  --------------------------------------------------------  IN:    Jevity: 120 mL  Total IN: 120 mL    OUT:    Indwelling Catheter - Urethral: 305 mL  Total OUT: 305 mL    Total NET: -185 mL            LABS:                        11.5   11.6  )-----------( 300      ( 01 May 2018 06:01 )             35.4     05-01    148<H>  |  107  |  21.0<H>  ----------------------------<  184<H>  3.7   |  26.0  |  0.32<L>    Ca    8.9      01 May 2018 06:01  Phos  3.7     05-01  Mg     2.2     05-01            CAPILLARY BLOOD GLUCOSE            CULTURES:  Culture Results:   No growth at 5 days. (04-25 @ 17:45)  Culture Results:   No growth at 5 days. (04-25 @ 17:44)      Physical Examination:    General: No acute distress.      HEENT: Pupils equal, reactive to light.  Symmetric.    PULM: Coarse to auscultation bilaterally, moderate thick tan sputum production    CVS: Regular rate and rhythm, no murmurs, rubs, or gallops    ABD: Soft, nondistended, nontender, normoactive bowel sounds, no masses    EXT: anasarca     SKIN: Warm and well perfused, no rashes noted.    NEURO: eyes open spontaneously, appears to nod appropriately but then nodding perserverated, does track but inconsistently     RADIOLOGY:     CRITICAL CARE TIME SPENT: 45

## 2018-05-01 NOTE — PROGRESS NOTE ADULT - PROBLEM SELECTOR PLAN 2
maintained SBT until afternoon= pt was with high RR 45, switched back to AC  pt's neurologic state precludes her from extubation as she would likely not be able to maintain patent airway tolerating some SBT but poor neuro status precludes extubation  notable increasing secretions

## 2018-05-01 NOTE — PROGRESS NOTE ADULT - SUBJECTIVE AND OBJECTIVE BOX
OVERNIGHT EVENTS: no real clinical changes, still poorly responsive, vent dependent, alot of secretions.     Present Symptoms:     Dyspnea: vented   Nausea/Vomiting: No  Anxiety:  unable   Depression: unable   Fatigue: unable   Loss of appetite: unable     Pain: none             Character-            Duration-            Effect-            Factors-            Frequency-            Location-            Severity-    Review of Systems: Reviewed               Unable to obtain due to poor mentation   All others negative    MEDICATIONS  (STANDING):  amLODIPine   Tablet 10 milliGRAM(s) Oral daily  bisacodyl Suppository 10 milliGRAM(s) Rectal daily  chlorhexidine 0.12% Liquid 15 milliLiter(s) Swish and Spit two times a day  enoxaparin Injectable 40 milliGRAM(s) SubCutaneous daily  folic acid 1 milliGRAM(s) Oral daily  hydrALAZINE 25 milliGRAM(s) Oral every 8 hours  labetalol 200 milliGRAM(s) Oral every 8 hours  pantoprazole   Suspension 40 milliGRAM(s) Oral before lunch  petrolatum Ophthalmic Ointment 1 Application(s) Both EYES every 6 hours  potassium chloride   Powder 40 milliEquivalent(s) Oral once  senna 2 Tablet(s) Oral at bedtime  thiamine 100 milliGRAM(s) Oral daily    MEDICATIONS  (PRN):  acetaminophen    Suspension 650 milliGRAM(s) Oral every 6 hours PRN For Temp greater than 38 C (100.4 F)  hydrALAZINE Injectable 20 milliGRAM(s) IV Push every 6 hours PRN systolic > 150  labetalol Injectable 20 milliGRAM(s) IV Push every 2 hours PRN systolic > 150  lactulose Syrup 20 Gram(s) Oral every 6 hours PRN Constpation  polyethylene glycol 3350 17 Gram(s) Oral daily PRN Constipation    PHYSICAL EXAM:    Vital Signs Last 24 Hrs  T(C): 38 (01 May 2018 10:00), Max: 38 (01 May 2018 10:00)  T(F): 100.4 (01 May 2018 10:00), Max: 100.4 (01 May 2018 10:00)  HR: 85 (01 May 2018 10:00) (65 - 87)  BP: 117/77 (01 May 2018 10:00) (106/57 - 149/81)  BP(mean): 93 (01 May 2018 10:00) (76 - 109)  RR: 34 (01 May 2018 10:00) (24 - 46)  SpO2: 100% (01 May 2018 10:00) (94% - 100%)    General: lethargic     Karnofsky:  20 %    HEENT: ET tube/trach    Lungs: comfortable     CV: normal      GI: NG tube     : bush    MSK: weakness      Skin: no rash    LABS:                      11.5   11.6  )-----------( 300      ( 01 May 2018 06:01 )             35.4     05-01    148<H>  |  107  |  21.0<H>  ----------------------------<  184<H>  3.7   |  26.0  |  0.32<L>    Ca    8.9      01 May 2018 06:01  Phos  3.7     05-01  Mg     2.2     05-01    I&O's Summary    30 Apr 2018 07:01  -  01 May 2018 07:00  --------------------------------------------------------  IN: 1920 mL / OUT: 1765 mL / NET: 155 mL    01 May 2018 07:01  -  01 May 2018 11:46  --------------------------------------------------------  IN: 120 mL / OUT: 145 mL / NET: -25 mL    RADIOLOGY & ADDITIONAL STUDIES:    ADVANCE DIRECTIVES: Full Code

## 2018-05-01 NOTE — PROGRESS NOTE ADULT - PROBLEM SELECTOR PLAN 1
titrate off cardene if blood pressure tolerates  uptitrated Labetolol  to 200mg q 8  added hydralazine 25mg q 8 BP controlled on present regimen  poor neuro exam due to location of damage (immanuel)

## 2018-05-02 DIAGNOSIS — R53.81 OTHER MALAISE: ICD-10-CM

## 2018-05-02 PROBLEM — Z00.00 ENCOUNTER FOR PREVENTIVE HEALTH EXAMINATION: Status: ACTIVE | Noted: 2018-05-02

## 2018-05-02 LAB
ALBUMIN SERPL ELPH-MCNC: 2.6 G/DL — LOW (ref 3.3–5.2)
ALP SERPL-CCNC: 191 U/L — HIGH (ref 40–120)
ALT FLD-CCNC: 87 U/L — HIGH
ANION GAP SERPL CALC-SCNC: 11 MMOL/L — SIGNIFICANT CHANGE UP (ref 5–17)
AST SERPL-CCNC: 63 U/L — HIGH
BILIRUB SERPL-MCNC: 0.3 MG/DL — LOW (ref 0.4–2)
BUN SERPL-MCNC: 23 MG/DL — HIGH (ref 8–20)
CALCIUM SERPL-MCNC: 9.7 MG/DL — SIGNIFICANT CHANGE UP (ref 8.6–10.2)
CHLORIDE SERPL-SCNC: 106 MMOL/L — SIGNIFICANT CHANGE UP (ref 98–107)
CO2 SERPL-SCNC: 29 MMOL/L — SIGNIFICANT CHANGE UP (ref 22–29)
CREAT SERPL-MCNC: 0.46 MG/DL — LOW (ref 0.5–1.3)
GLUCOSE SERPL-MCNC: 202 MG/DL — HIGH (ref 70–115)
HCT VFR BLD CALC: 34.1 % — LOW (ref 37–47)
HGB BLD-MCNC: 10.8 G/DL — LOW (ref 12–16)
MAGNESIUM SERPL-MCNC: 2.6 MG/DL — SIGNIFICANT CHANGE UP (ref 1.6–2.6)
MCHC RBC-ENTMCNC: 31.1 PG — HIGH (ref 27–31)
MCHC RBC-ENTMCNC: 31.7 G/DL — LOW (ref 32–36)
MCV RBC AUTO: 98.3 FL — SIGNIFICANT CHANGE UP (ref 81–99)
PHOSPHATE SERPL-MCNC: 3.9 MG/DL — SIGNIFICANT CHANGE UP (ref 2.4–4.7)
PLATELET # BLD AUTO: 314 K/UL — SIGNIFICANT CHANGE UP (ref 150–400)
POTASSIUM SERPL-MCNC: 4.2 MMOL/L — SIGNIFICANT CHANGE UP (ref 3.5–5.3)
POTASSIUM SERPL-SCNC: 4.2 MMOL/L — SIGNIFICANT CHANGE UP (ref 3.5–5.3)
PROT SERPL-MCNC: 6.8 G/DL — SIGNIFICANT CHANGE UP (ref 6.6–8.7)
RBC # BLD: 3.47 M/UL — LOW (ref 4.4–5.2)
RBC # FLD: 14.4 % — SIGNIFICANT CHANGE UP (ref 11–15.6)
SODIUM SERPL-SCNC: 146 MMOL/L — HIGH (ref 135–145)
WBC # BLD: 9.2 K/UL — SIGNIFICANT CHANGE UP (ref 4.8–10.8)
WBC # FLD AUTO: 9.2 K/UL — SIGNIFICANT CHANGE UP (ref 4.8–10.8)

## 2018-05-02 PROCEDURE — 71045 X-RAY EXAM CHEST 1 VIEW: CPT | Mod: 26

## 2018-05-02 PROCEDURE — 99291 CRITICAL CARE FIRST HOUR: CPT

## 2018-05-02 PROCEDURE — 99233 SBSQ HOSP IP/OBS HIGH 50: CPT

## 2018-05-02 RX ORDER — MIDAZOLAM HYDROCHLORIDE 1 MG/ML
2 INJECTION, SOLUTION INTRAMUSCULAR; INTRAVENOUS ONCE
Qty: 0 | Refills: 0 | Status: DISCONTINUED | OUTPATIENT
Start: 2018-05-02 | End: 2018-05-02

## 2018-05-02 RX ORDER — HYDRALAZINE HCL 50 MG
25 TABLET ORAL EVERY 8 HOURS
Qty: 0 | Refills: 0 | Status: DISCONTINUED | OUTPATIENT
Start: 2018-05-02 | End: 2018-05-07

## 2018-05-02 RX ADMIN — ENOXAPARIN SODIUM 40 MILLIGRAM(S): 100 INJECTION SUBCUTANEOUS at 12:50

## 2018-05-02 RX ADMIN — PANTOPRAZOLE SODIUM 40 MILLIGRAM(S): 20 TABLET, DELAYED RELEASE ORAL at 12:50

## 2018-05-02 RX ADMIN — Medication 1 APPLICATION(S): at 05:04

## 2018-05-02 RX ADMIN — Medication 10 MILLIGRAM(S): at 12:50

## 2018-05-02 RX ADMIN — Medication 1 APPLICATION(S): at 22:50

## 2018-05-02 RX ADMIN — Medication 100 MILLIGRAM(S): at 12:50

## 2018-05-02 RX ADMIN — CHLORHEXIDINE GLUCONATE 15 MILLILITER(S): 213 SOLUTION TOPICAL at 18:40

## 2018-05-02 RX ADMIN — Medication 650 MILLIGRAM(S): at 20:15

## 2018-05-02 RX ADMIN — Medication 25 MILLIGRAM(S): at 12:49

## 2018-05-02 RX ADMIN — Medication 25 MILLIGRAM(S): at 05:04

## 2018-05-02 RX ADMIN — AMLODIPINE BESYLATE 10 MILLIGRAM(S): 2.5 TABLET ORAL at 05:04

## 2018-05-02 RX ADMIN — Medication 200 MILLIGRAM(S): at 05:04

## 2018-05-02 RX ADMIN — CHLORHEXIDINE GLUCONATE 15 MILLILITER(S): 213 SOLUTION TOPICAL at 05:04

## 2018-05-02 RX ADMIN — SENNA PLUS 2 TABLET(S): 8.6 TABLET ORAL at 22:43

## 2018-05-02 RX ADMIN — Medication 1 APPLICATION(S): at 12:49

## 2018-05-02 RX ADMIN — Medication 200 MILLIGRAM(S): at 12:50

## 2018-05-02 RX ADMIN — Medication 1 MILLIGRAM(S): at 12:50

## 2018-05-02 RX ADMIN — MIDAZOLAM HYDROCHLORIDE 2 MILLIGRAM(S): 1 INJECTION, SOLUTION INTRAMUSCULAR; INTRAVENOUS at 01:52

## 2018-05-02 RX ADMIN — Medication 1 APPLICATION(S): at 18:40

## 2018-05-02 NOTE — PROGRESS NOTE ADULT - PROBLEM SELECTOR PLAN 4
-met with  Capo, he let me know that patient has a mom in [place] and he wants her included in decision making. She can come tomorrow, and  will let me know time of meeting. Currently  does not feel like she would want a trach, but he wants her mom to be made aware of her situation as well.

## 2018-05-02 NOTE — PROGRESS NOTE ADULT - PROBLEM SELECTOR PLAN 1
Cannot exclude relation to uncontrolled hypertension. CTA unremarkable. Repeat CT head showed persistent pontine bleed. BP now more controlled, continue PO antihypertensive medication regimen- hold parameters added. Avoid sedative medications if at all possible in order to allow for true neurological exams. Cooling blanket placed for persistent fever, neurogenic in nature? Have been observing off antibiotics, pancultures neg, will discuss role for antibiotics with daytime team.

## 2018-05-02 NOTE — PROGRESS NOTE ADULT - PROBLEM SELECTOR PLAN 2
- poor airway clearance, will require trach if family is to continue,  is leaning towards no trach and comfort but we need rest of family to be made aware as well.

## 2018-05-02 NOTE — PROGRESS NOTE ADULT - PROBLEM SELECTOR PLAN 3
Not much improvement. Related to ICH. Continue neuro checks. Low threshold for repeat CT head. Hold all sedative agents if at all possible.

## 2018-05-02 NOTE — PROGRESS NOTE ADULT - PROBLEM SELECTOR PLAN 2
tolerating some SBT but poor neuro status precludes extubation  notable increasing secretions would not use scopalamine as it may cloud her sensorium further

## 2018-05-02 NOTE — PROGRESS NOTE ADULT - ASSESSMENT
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     1) ICH  Avoid antiplatelet.   Continue blood pressure control.   Supportive care/vent management.  Overall prognosis poor.   Case discussed with Dr Colón. Patient's condition appears to be deteriorating slowly.   d/w - he may be leaning towards withdrawal of ventilatory support instead of trach.    2) Hypertension  Continue blood pressure control.     3) goals of care  had long discussion about goals of care with  today.    he may be leaning towards comfort care    will follow with you    Anirudh Abarca MD PhD   [phone]

## 2018-05-02 NOTE — PROGRESS NOTE ADULT - SUBJECTIVE AND OBJECTIVE BOX
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 Cardene weaned off. Remains on vent with poor neuro exam.     Events last 24 hours: periods of tachypnea and distress while on SBT, copious secretions     PAST MEDICAL & SURGICAL HISTORY:  Alcohol abuse  No significant past surgical history      Review of Systems:  Cannot be obtained, pt intubated       Medications:    amLODIPine   Tablet 10 milliGRAM(s) Oral daily  hydrALAZINE 25 milliGRAM(s) Oral every 8 hours  hydrALAZINE Injectable 20 milliGRAM(s) IV Push every 6 hours PRN  labetalol 200 milliGRAM(s) Oral every 8 hours  labetalol Injectable 20 milliGRAM(s) IV Push every 2 hours PRN      acetaminophen    Suspension 650 milliGRAM(s) Oral every 6 hours PRN      enoxaparin Injectable 40 milliGRAM(s) SubCutaneous daily    bisacodyl Suppository 10 milliGRAM(s) Rectal daily  lactulose Syrup 20 Gram(s) Oral every 6 hours PRN  pantoprazole   Suspension 40 milliGRAM(s) Oral before lunch  polyethylene glycol 3350 17 Gram(s) Oral daily PRN  senna 2 Tablet(s) Oral at bedtime        folic acid 1 milliGRAM(s) Oral daily  thiamine 100 milliGRAM(s) Oral daily      chlorhexidine 0.12% Liquid 15 milliLiter(s) Swish and Spit two times a day  petrolatum Ophthalmic Ointment 1 Application(s) Both EYES every 6 hours        Mode: CPAP with PS  FiO2: 30  PEEP: 5  PS: 10  MAP: 10      ICU Vital Signs Last 24 Hrs  T(C): 37.5 (02 May 2018 14:00), Max: 38 (01 May 2018 16:00)  T(F): 99.5 (02 May 2018 14:00), Max: 100.4 (01 May 2018 16:00)  HR: 78 (02 May 2018 15:00) (66 - 87)  BP: 108/58 (02 May 2018 15:00) (102/67 - 141/70)  BP(mean): 78 (02 May 2018 15:00) (74 - 99)  ABP: --  ABP(mean): --  RR: 27 (02 May 2018 15:00) (20 - 39)  SpO2: 100% (02 May 2018 15:00) (96% - 100%)          I&O's Detail    01 May 2018 07:01  -  02 May 2018 07:00  --------------------------------------------------------  IN:    Enteral Tube Flush: 500 mL    Jevity: 1440 mL  Total IN: 1940 mL    OUT:    Indwelling Catheter - Urethral: 1225 mL  Total OUT: 1225 mL    Total NET: 715 mL      02 May 2018 07:01  -  02 May 2018 15:07  --------------------------------------------------------  IN:    Jevity: 300 mL  Total IN: 300 mL    OUT:    Indwelling Catheter - Urethral: 300 mL  Total OUT: 300 mL    Total NET: 0 mL            LABS:                        10.8   9.2   )-----------( 314      ( 02 May 2018 05:26 )             34.1     05-02    146<H>  |  106  |  23.0<H>  ----------------------------<  202<H>  4.2   |  29.0  |  0.46<L>    Ca    9.7      02 May 2018 05:26  Phos  3.9     05-02  Mg     2.6     05-02    TPro  6.8  /  Alb  2.6<L>  /  TBili  0.3<L>  /  DBili  x   /  AST  63<H>  /  ALT  87<H>  /  AlkPhos  191<H>  05-02          CAPILLARY BLOOD GLUCOSE            CULTURES:  Culture Results:   No growth at 5 days. (04-25 @ 17:45)  Culture Results:   No growth at 5 days. (04-25 @ 17:44)      Physical Examination:    General: No acute distress.      HEENT: Pupils equal, reactive to light.  Symmetric.    PULM: Coarse to auscultation bilaterally, moderate thick tan sputum production    CVS: Regular rate and rhythm, no murmurs, rubs, or gallops    ABD: Soft, nondistended, nontender, normoactive bowel sounds, no masses    EXT: anasarca     SKIN: Warm and well perfused, no rashes noted.    NEURO: eyes open spontaneously, appears to nod appropriately but then nodding perserverated, does track but inconsistently     RADIOLOGY: reviewed    CRITICAL CARE TIME SPENT: 45

## 2018-05-02 NOTE — PROGRESS NOTE ADULT - SUBJECTIVE AND OBJECTIVE BOX
CC: patient being seen for pontine hemorrhage, respiratory failure     BRIEF HOSPITAL COURSE: doing poorly, more secretions, less and less responsive.     Present Symptoms:     Dyspnea: respiratory failure   Nausea/Vomiting: No  Anxiety:  unable - but does seem agitated   Depression: unable   Fatigue: Yes  Loss of appetite: unable     Pain: none             Character-            Duration-            Effect-            Factors-            Frequency-            Location-            Severity-    Review of Systems: Reviewed                 Unable to obtain due to poor mentation   All others negative    MEDICATIONS  (STANDING):  amLODIPine   Tablet 10 milliGRAM(s) Oral daily  bisacodyl Suppository 10 milliGRAM(s) Rectal daily  chlorhexidine 0.12% Liquid 15 milliLiter(s) Swish and Spit two times a day  enoxaparin Injectable 40 milliGRAM(s) SubCutaneous daily  folic acid 1 milliGRAM(s) Oral daily  hydrALAZINE 25 milliGRAM(s) Oral every 8 hours  labetalol 200 milliGRAM(s) Oral every 8 hours  pantoprazole   Suspension 40 milliGRAM(s) Oral before lunch  petrolatum Ophthalmic Ointment 1 Application(s) Both EYES every 6 hours  senna 2 Tablet(s) Oral at bedtime  thiamine 100 milliGRAM(s) Oral daily    MEDICATIONS  (PRN):  acetaminophen    Suspension 650 milliGRAM(s) Oral every 6 hours PRN For Temp greater than 38 C (100.4 F)  hydrALAZINE Injectable 20 milliGRAM(s) IV Push every 6 hours PRN systolic > 150  labetalol Injectable 20 milliGRAM(s) IV Push every 2 hours PRN systolic > 150  lactulose Syrup 20 Gram(s) Oral every 6 hours PRN Constpation  polyethylene glycol 3350 17 Gram(s) Oral daily PRN Constipation    PHYSICAL EXAM:    Vital Signs Last 24 Hrs  T(C): 37.4 (02 May 2018 10:50), Max: 38.2 (01 May 2018 12:00)  T(F): 99.3 (02 May 2018 10:50), Max: 100.8 (01 May 2018 12:00)  HR: 80 (02 May 2018 10:50) (66 - 89)  BP: 123/68 (02 May 2018 10:50) (102/67 - 128/83)  BP(mean): 91 (02 May 2018 10:50) (74 - 100)  RR: 27 (02 May 2018 10:50) (20 - 39)  SpO2: 100% (02 May 2018 10:50) (96% - 100%)    General: opens eyes     Karnofsky:  20 %    HEENT: ET tube     Lungs: comfortable     CV: normal      GI: NG tube     : eleazar    MSK: weakness      Skin: no rash    LABS:                      10.8   9.2   )-----------( 314      ( 02 May 2018 05:26 )             34.1     05-02    146<H>  |  106  |  23.0<H>  ----------------------------<  202<H>  4.2   |  29.0  |  0.46<L>    Ca    9.7      02 May 2018 05:26  Phos  3.9     05-02  Mg     2.6     05-02    TPro  6.8  /  Alb  2.6<L>  /  TBili  0.3<L>  /  DBili  x   /  AST  63<H>  /  ALT  87<H>  /  AlkPhos  191<H>  05-02    I&O's Summary    01 May 2018 07:01  -  02 May 2018 07:00  --------------------------------------------------------  IN: 1940 mL / OUT: 1225 mL / NET: 715 mL    RADIOLOGY & ADDITIONAL STUDIES:    ADVANCE DIRECTIVES: Full Code

## 2018-05-02 NOTE — PROGRESS NOTE ADULT - SUBJECTIVE AND OBJECTIVE BOX
[facility]                                        Ne[facility]                                 Rima Whiteside, & Saad                                  [address]                                        [place], [place], [address]                                             [phone]        CC/HPI: pontine intracranial hemorrhage. poor neurological status, remains intubated    Interim history:  She has remained on mechanical ventilator since yesterday and remains unresponsive to voice. exam worse than when last seen by me    ROS:   Unobtainable due to patient's condition.     MEDICATIONS  (STANDING):  amLODIPine   Tablet 10 milliGRAM(s) Oral daily  bisacodyl Suppository 10 milliGRAM(s) Rectal daily  chlorhexidine 0.12% Liquid 15 milliLiter(s) Swish and Spit two times a day  enoxaparin Injectable 40 milliGRAM(s) SubCutaneous daily  folic acid 1 milliGRAM(s) Oral daily  hydrALAZINE 25 milliGRAM(s) Oral every 8 hours  labetalol 200 milliGRAM(s) Oral every 8 hours  pantoprazole   Suspension 40 milliGRAM(s) Oral before lunch  petrolatum Ophthalmic Ointment 1 Application(s) Both EYES every 6 hours  senna 2 Tablet(s) Oral at bedtime  thiamine 100 milliGRAM(s) Oral daily    MEDICATIONS  (PRN):  acetaminophen    Suspension 650 milliGRAM(s) Oral every 6 hours PRN For Temp greater than 38 C (100.4 F)  hydrALAZINE Injectable 20 milliGRAM(s) IV Push every 6 hours PRN systolic > 150  labetalol Injectable 20 milliGRAM(s) IV Push every 2 hours PRN systolic > 150  lactulose Syrup 20 Gram(s) Oral every 6 hours PRN Constpation  polyethylene glycol 3350 17 Gram(s) Oral daily PRN Constipation      Vital Signs Last 24 Hrs  T(C): 37.5 (02 May 2018 14:00), Max: 38 (01 May 2018 16:00)  T(F): 99.5 (02 May 2018 14:00), Max: 100.4 (01 May 2018 16:00)  HR: 78 (02 May 2018 15:00) (66 - 87)  BP: 108/58 (02 May 2018 15:00) (102/67 - 141/70)  BP(mean): 78 (02 May 2018 15:00) (74 - 99)  RR: 27 (02 May 2018 15:00) (20 - 39)  SpO2: 100% (02 May 2018 15:00) (96% - 100%)    Detailed Neurologic Exam:    Mental status: minimal Eye opening,  but not responding to voice. Some roving eye movements.   Not tracking with gaze today.     Cranial nerves: Pupils pinpoint. There is slight blink to threat. There is roving eye movement in the vertical plane. Corneal reflexes absent. Palate and tongue cannot be assessed.     Motor/Sensory:  There is normal bulk and tone.  There is no tremor.  There is minimal extensor posturing bilaterally to stimuli.  no purposeful movement    Reflexes: trace throughout and plantar responses are extensor bilaterally with some weak triple flexion to plantar stimulation.    Cerebellar: Cannot be assessed.    Labs:                           10.8   9.2   )-----------( 314      ( 02 May 2018 05:26 )             34.1   05-02    146<H>  |  106  |  23.0<H>  ----------------------------<  202<H>  4.2   |  29.0  |  0.46<L>    Ca    9.7      02 May 2018 05:26  Phos  3.9     05-02  Mg     2.6     05-02    TPro  6.8  /  Alb  2.6<L>  /  TBili  0.3<L>  /  DBili  x   /  AST  63<H>  /  ALT  87<H>  /  AlkPhos  191<H>  05-02      Rad  Previous neurological studies:  CT head 4/29/18 showed pontine intracranial hemorrhage which is unchanged.

## 2018-05-02 NOTE — PROGRESS NOTE ADULT - PROBLEM SELECTOR PLAN 2
Related to ICH and AMS. Continue mechanical ventilation with full support, titrating to maintain SaO2 > 92%. Comfortable off sedation. Has been tolerating PSV 10/5 with periodic tachypnea and copious secretions. Have switched back to full ventilator support to rest for the night, will again perform SBT in the morning.

## 2018-05-02 NOTE — CHART NOTE - NSCHARTNOTEFT_GEN_A_CORE
Source: Patient [ ]  Family [ ]   other [x ]EMR     Current Diet:   NPO   Patient reports [ ] nausea  [ ] vomiting [ ] diarrhea [ ] constipation  [ ]chewing problems [ ] swallowing issues  [ ] other:     PO intake:  < 50% [ ]   50-75%  [ ]   %  [ ]  other :    Source for PO intake [ ] Patient [ ] family [ ] chart [ ] staff [ ] other    Enteral /Parenteral Nutrition: Diet, NPO with Tube Feed:   Tube Feeding Modality: Orogastric  Jevity 1.5 Bhupendra  Total Volume for 24 Hours (mL): 1440  Continuous  Starting Tube Feed Rate {mL per Hour}: 60  Until Goal Tube Feed Rate (mL per Hour): 60  Tube Feed Duration (in Hours): 24  Tube Feed Start Time: 11:00 (04-24-18 @ 10:49)      Current Weight: 5/2: 74.6kg   4/26: 69kg   (unsure of accuracy, will monitor trends)     % Weight Change     Pertinent Medications: MEDICATIONS  (STANDING):  amLODIPine   Tablet 10 milliGRAM(s) Oral daily  bisacodyl Suppository 10 milliGRAM(s) Rectal daily  chlorhexidine 0.12% Liquid 15 milliLiter(s) Swish and Spit two times a day  enoxaparin Injectable 40 milliGRAM(s) SubCutaneous daily  folic acid 1 milliGRAM(s) Oral daily  hydrALAZINE 25 milliGRAM(s) Oral every 8 hours  labetalol 200 milliGRAM(s) Oral every 8 hours  pantoprazole   Suspension 40 milliGRAM(s) Oral before lunch  petrolatum Ophthalmic Ointment 1 Application(s) Both EYES every 6 hours  senna 2 Tablet(s) Oral at bedtime  thiamine 100 milliGRAM(s) Oral daily    MEDICATIONS  (PRN):  acetaminophen    Suspension 650 milliGRAM(s) Oral every 6 hours PRN For Temp greater than 38 C (100.4 F)  hydrALAZINE Injectable 20 milliGRAM(s) IV Push every 6 hours PRN systolic > 150  labetalol Injectable 20 milliGRAM(s) IV Push every 2 hours PRN systolic > 150  lactulose Syrup 20 Gram(s) Oral every 6 hours PRN Constpation  polyethylene glycol 3350 17 Gram(s) Oral daily PRN Constipation    Pertinent Labs: CBC Full  -  ( 02 May 2018 05:26 )  WBC Count : 9.2 K/uL  Hemoglobin : 10.8 g/dL  Hematocrit : 34.1 %  Platelet Count - Automated : 314 K/uL  Mean Cell Volume : 98.3 fl  Mean Cell Hemoglobin : 31.1 pg  Mean Cell Hemoglobin Concentration : 31.7 g/dL  Auto Neutrophil # : x  Auto Lymphocyte # : x  Auto Monocyte # : x  Auto Eosinophil # : x  Auto Basophil # : x  Auto Neutrophil % : x  Auto Lymphocyte % : x  Auto Monocyte % : x  Auto Eosinophil % : x  Auto Basophil % : x        Skin:     Nutrition focused physical exam conducted - found signs of malnutrition [x ]absent [ ]present    Subcutaneous fat loss: [ ] Orbital fat pads region, [ ]Buccal fat region, [ ]Triceps region,  [ ]Ribs region    Muscle wasting: [ ]Temples region, [ ]Clavicle region, [ ]Shoulder region, [ ]Scapula region, [ ]Interosseous region,  [ ]thigh region, [ ]Calf region    Estimated Needs:   [x ] no change since previous assessment  [ ] recalculated:     Current Nutrition Diagnosis:   Pt remains at high nutrition risk secondary to difficulty swallowing related to recent pontine hemorrhagic stroke on vent support as evidenced by current need for nutrition support. Pt currently receiving enteral feeds of Jevity @ 60ml/hr (x20 hours) 1200ml/day 1800kcal, 77gm protein. Meeting estimated nutrition needs at this time. Recommendations below:    Recommendations:   1.Rx: MVI daily   2. Continue with enteral regime at this time.  3. Consider increasing free water to 240ml every 4 hours.     Monitoring and Evaluation:   [ ] PO intake [x ] Tolerance to diet prescription [X] Weights  [X] Follow up per protocol [X] Labs:  Na+, BUN, H/H

## 2018-05-02 NOTE — PROGRESS NOTE ADULT - PROBLEM SELECTOR PLAN 7
continued discussions with  Cecil and extended family. They are leaning toward palliative extubation but have yet to definitively decide.

## 2018-05-02 NOTE — PROGRESS NOTE ADULT - PROBLEM SELECTOR PLAN 5
Held extensive discussion with multiple family members regarding patient's current status and her options going forward if she were to remain unweanable from the ventilator- tracheostomy vs. elective extubation and comfort measures. They did not mention to me which avenue they were leaning towards, however, as per report, the  is leaning towards elective extubation.

## 2018-05-02 NOTE — PROGRESS NOTE ADULT - ATTENDING COMMENTS
Thank you for the opportunity to assist with the care of this patient.   [facility] [phone].

## 2018-05-02 NOTE — PROGRESS NOTE ADULT - SUBJECTIVE AND OBJECTIVE BOX
Patient is a 63y old  Female who presents with a chief complaint of complained of dizzyness and collapsed at home (23 Apr 2018 10:59)      62 y/o F with a h/o EtOH abuse, admitted on 4/23 with dizziness and syncope. Upon arrival in the ED she was obtunded and intubated for airway support. Her BP was noted to be elevated. 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 There was no evidence of aneurysm or dissection noted on CTA. IV Cardene initiated for BP control. Poor neurological exam. Neurosurgery not recommending surgical intervention.    Events last 24 hours: Patient remains intubated, tolerating PSV 10/5. Cardene infusion off. Neuro exam remains mostly unchanged, nodding/shaking head appropriately, reports sensation in all 4 extremities, no spontaneous movements of b/l UE. Febrile.    PAST MEDICAL & SURGICAL HISTORY:  Alcohol abuse  No significant past surgical history      Review of Systems: Unable to obtain as patient is intubated      Medications:    amLODIPine   Tablet 10 milliGRAM(s) Oral daily  hydrALAZINE 25 milliGRAM(s) Oral every 8 hours  hydrALAZINE Injectable 20 milliGRAM(s) IV Push every 6 hours PRN  labetalol 200 milliGRAM(s) Oral every 8 hours  labetalol Injectable 20 milliGRAM(s) IV Push every 2 hours PRN  acetaminophen    Suspension 650 milliGRAM(s) Oral every 6 hours PRN  enoxaparin Injectable 40 milliGRAM(s) SubCutaneous daily  bisacodyl Suppository 10 milliGRAM(s) Rectal daily  lactulose Syrup 20 Gram(s) Oral every 6 hours PRN  pantoprazole   Suspension 40 milliGRAM(s) Oral before lunch  polyethylene glycol 3350 17 Gram(s) Oral daily PRN  senna 2 Tablet(s) Oral at bedtime  folic acid 1 milliGRAM(s) Oral daily  thiamine 100 milliGRAM(s) Oral daily  chlorhexidine 0.12% Liquid 15 milliLiter(s) Swish and Spit two times a day  petrolatum Ophthalmic Ointment 1 Application(s) Both EYES every 6 hours        Mode: AC/ CMV (Assist Control/ Continuous Mandatory Ventilation)  RR (machine): 20  TV (machine): 400  FiO2: 30  PEEP: 5  MAP: 10  PIP: 20      ICU Vital Signs Last 24 Hrs  T(C): 37.3 (02 May 2018 18:00), Max: 38 (01 May 2018 23:00)  T(F): 99.1 (02 May 2018 18:00), Max: 100.4 (01 May 2018 23:00)  HR: 92 (02 May 2018 20:34) (66 - 99)  BP: 113/59 (02 May 2018 19:00) (102/67 - 141/70)  BP(mean): 83 (02 May 2018 19:00) (76 - 99)  ABP: --  ABP(mean): --  RR: 25 (02 May 2018 19:00) (20 - 39)  SpO2: 100% (02 May 2018 20:34) (98% - 100%)          I&O's Detail    01 May 2018 07:01  -  02 May 2018 07:00  --------------------------------------------------------  IN:    Enteral Tube Flush: 500 mL    Jevity: 1440 mL  Total IN: 1940 mL    OUT:    Indwelling Catheter - Urethral: 1225 mL  Total OUT: 1225 mL    Total NET: 715 mL      02 May 2018 07:01  -  02 May 2018 22:48  --------------------------------------------------------  IN:    Enteral Tube Flush: 400 mL    Jevity: 480 mL  Total IN: 880 mL    OUT:    Indwelling Catheter - Urethral: 360 mL  Total OUT: 360 mL    Total NET: 520 mL            LABS:                        10.8   9.2   )-----------( 314      ( 02 May 2018 05:26 )             34.1     05-02    146<H>  |  106  |  23.0<H>  ----------------------------<  202<H>  4.2   |  29.0  |  0.46<L>    Ca    9.7      02 May 2018 05:26  Phos  3.9     05-02  Mg     2.6     05-02    TPro  6.8  /  Alb  2.6<L>  /  TBili  0.3<L>  /  DBili  x   /  AST  63<H>  /  ALT  87<H>  /  AlkPhos  191<H>  05-02          CAPILLARY BLOOD GLUCOSE            CULTURES:      Physical Examination:    General: No acute distress.  intubated    HEENT: Pupils equal, reactive to light.  Symmetric.    PULM: Coarse bilaterally, significant sputum production    CVS: Regular rate and rhythm, no murmurs, rubs, or gallops    ABD: Soft, nondistended, nontender, normoactive bowel sounds, no masses    EXT: anasarcic, nontender    SKIN: Warm and well perfused, no rashes noted.    NEURO: opens eyes to verbal stimuli, will track but inconsistently, nodding/shaking head 'no' appropriately at this point in time, reports sensation in all 4 extremities, withdraws b/l LE to noxious stimuli, no withdrawal of b/l UE to noxious stimuli, no purposeful movements of extremities at this time      RADIOLOGY:     < from: Xray Chest 1 View- PORTABLE-Urgent (05.02.18 @ 16:21) >  FINDINGS:    Single frontal view of the chest demonstrates OG tube tip is at the GE   junction. Tracheal tube tip is at the thoracic inlet. The lungs are   clear. The cardiomediastinal silhouette is enlarged. No acute osseous   abnormalities. Overlying EKG leads and wiresare noted.    IMPRESSION: NG tube tip at the GE junction. Endotracheal tube tip at the   thoracic inlet. Findings discussed with ICU NOAH Cristina.      < from: CT Head No Cont (04.29.18 @ 13:44) >  FINDINGS:   CT dated 4/28/2018 available for review.    The brain demonstrates unchanged small pontine hemorrhage.   Mild   periventricular white matter ischemia unchanged. No acute cerebral   cortical infarct is seen.  No mass effect is found in the brain.      The ventricles, sulci and basal cisterns appear unremarkable.         The orbits are unremarkable.  The paranasal sinuses are significant for   mucosal thickening and secretions in the BILATERAL maxillary and ethmoid   sinuses. ET tube is noted.  The nasal cavity appears intact.  The   nasopharynx is symmetric.  The central skull base, petrous temporal bones   and calvarium remain intact.      IMPRESSION:   unchanged small pontine hemorrhage.   Mildperiventricular   white matter ischemia unchanged. mucosal thickening and secretions in the   BILATERAL maxillary and ethmoid sinuses. ET tube is noted.            CRITICAL CARE TIME SPENT: 52 mins  Evaluating/treating patient, reviewing data/labs/imaging, discussing case with multidisciplinary team, discussing plan/goals of care with patient/family. Non-inclusive of procedure time.

## 2018-05-03 LAB
ANION GAP SERPL CALC-SCNC: 14 MMOL/L — SIGNIFICANT CHANGE UP (ref 5–17)
BUN SERPL-MCNC: 22 MG/DL — HIGH (ref 8–20)
CALCIUM SERPL-MCNC: 9.8 MG/DL — SIGNIFICANT CHANGE UP (ref 8.6–10.2)
CHLORIDE SERPL-SCNC: 104 MMOL/L — SIGNIFICANT CHANGE UP (ref 98–107)
CO2 SERPL-SCNC: 28 MMOL/L — SIGNIFICANT CHANGE UP (ref 22–29)
CREAT SERPL-MCNC: 0.56 MG/DL — SIGNIFICANT CHANGE UP (ref 0.5–1.3)
GLUCOSE SERPL-MCNC: 148 MG/DL — HIGH (ref 70–115)
HCT VFR BLD CALC: 35.2 % — LOW (ref 37–47)
HGB BLD-MCNC: 11.3 G/DL — LOW (ref 12–16)
MAGNESIUM SERPL-MCNC: 2.7 MG/DL — HIGH (ref 1.6–2.6)
MCHC RBC-ENTMCNC: 31.7 PG — HIGH (ref 27–31)
MCHC RBC-ENTMCNC: 32.1 G/DL — SIGNIFICANT CHANGE UP (ref 32–36)
MCV RBC AUTO: 98.9 FL — SIGNIFICANT CHANGE UP (ref 81–99)
PHOSPHATE SERPL-MCNC: 3.7 MG/DL — SIGNIFICANT CHANGE UP (ref 2.4–4.7)
PLATELET # BLD AUTO: 324 K/UL — SIGNIFICANT CHANGE UP (ref 150–400)
POTASSIUM SERPL-MCNC: 4.5 MMOL/L — SIGNIFICANT CHANGE UP (ref 3.5–5.3)
POTASSIUM SERPL-SCNC: 4.5 MMOL/L — SIGNIFICANT CHANGE UP (ref 3.5–5.3)
RBC # BLD: 3.56 M/UL — LOW (ref 4.4–5.2)
RBC # FLD: 14.4 % — SIGNIFICANT CHANGE UP (ref 11–15.6)
SODIUM SERPL-SCNC: 146 MMOL/L — HIGH (ref 135–145)
WBC # BLD: 8.8 K/UL — SIGNIFICANT CHANGE UP (ref 4.8–10.8)
WBC # FLD AUTO: 8.8 K/UL — SIGNIFICANT CHANGE UP (ref 4.8–10.8)

## 2018-05-03 PROCEDURE — 99233 SBSQ HOSP IP/OBS HIGH 50: CPT

## 2018-05-03 PROCEDURE — 99291 CRITICAL CARE FIRST HOUR: CPT

## 2018-05-03 RX ADMIN — PANTOPRAZOLE SODIUM 40 MILLIGRAM(S): 20 TABLET, DELAYED RELEASE ORAL at 13:06

## 2018-05-03 RX ADMIN — Medication 10 MILLIGRAM(S): at 08:47

## 2018-05-03 RX ADMIN — Medication 200 MILLIGRAM(S): at 05:12

## 2018-05-03 RX ADMIN — Medication 1 MILLIGRAM(S): at 13:07

## 2018-05-03 RX ADMIN — AMLODIPINE BESYLATE 10 MILLIGRAM(S): 2.5 TABLET ORAL at 05:12

## 2018-05-03 RX ADMIN — Medication 1 APPLICATION(S): at 18:30

## 2018-05-03 RX ADMIN — Medication 1 APPLICATION(S): at 13:07

## 2018-05-03 RX ADMIN — Medication 25 MILLIGRAM(S): at 13:10

## 2018-05-03 RX ADMIN — Medication 1 APPLICATION(S): at 05:12

## 2018-05-03 RX ADMIN — ENOXAPARIN SODIUM 40 MILLIGRAM(S): 100 INJECTION SUBCUTANEOUS at 13:06

## 2018-05-03 RX ADMIN — Medication 200 MILLIGRAM(S): at 13:10

## 2018-05-03 RX ADMIN — Medication 100 MILLIGRAM(S): at 13:07

## 2018-05-03 RX ADMIN — Medication 25 MILLIGRAM(S): at 05:12

## 2018-05-03 RX ADMIN — Medication 650 MILLIGRAM(S): at 18:29

## 2018-05-03 RX ADMIN — SENNA PLUS 2 TABLET(S): 8.6 TABLET ORAL at 22:12

## 2018-05-03 RX ADMIN — CHLORHEXIDINE GLUCONATE 15 MILLILITER(S): 213 SOLUTION TOPICAL at 18:29

## 2018-05-03 RX ADMIN — CHLORHEXIDINE GLUCONATE 15 MILLILITER(S): 213 SOLUTION TOPICAL at 05:12

## 2018-05-03 NOTE — PROGRESS NOTE ADULT - SUBJECTIVE AND OBJECTIVE BOX
OVERNIGHT EVENTS:    BRIEF HOSPITAL COURSE:    Present Symptoms:     Dyspnea: 0 1 2 3   Nausea/Vomiting: Yes No  Anxiety:  Yes No  Depression: Yes No  Fatigue: Yes No  Loss of appetite: Yes No    Pain:             Character-            Duration-            Effect-            Factors-            Frequency-            Location-            Severity-    Review of Systems: Reviewed                     Negative:                     Positive:  Unable to obtain due to poor mentation   All others negative    MEDICATIONS  (STANDING):  amLODIPine   Tablet 10 milliGRAM(s) Oral daily  bisacodyl Suppository 10 milliGRAM(s) Rectal daily  chlorhexidine 0.12% Liquid 15 milliLiter(s) Swish and Spit two times a day  enoxaparin Injectable 40 milliGRAM(s) SubCutaneous daily  folic acid 1 milliGRAM(s) Oral daily  hydrALAZINE 25 milliGRAM(s) Oral every 8 hours  labetalol 200 milliGRAM(s) Oral every 8 hours  pantoprazole   Suspension 40 milliGRAM(s) Oral before lunch  petrolatum Ophthalmic Ointment 1 Application(s) Both EYES every 6 hours  senna 2 Tablet(s) Oral at bedtime  thiamine 100 milliGRAM(s) Oral daily    MEDICATIONS  (PRN):  acetaminophen    Suspension 650 milliGRAM(s) Oral every 6 hours PRN For Temp greater than 38 C (100.4 F)  hydrALAZINE Injectable 20 milliGRAM(s) IV Push every 6 hours PRN systolic > 150  labetalol Injectable 20 milliGRAM(s) IV Push every 2 hours PRN systolic > 150  lactulose Syrup 20 Gram(s) Oral every 6 hours PRN Constpation  polyethylene glycol 3350 17 Gram(s) Oral daily PRN Constipation      PHYSICAL EXAM:    Vital Signs Last 24 Hrs  T(C): 38 (03 May 2018 14:00), Max: 39.1 (02 May 2018 20:00)  T(F): 100.4 (03 May 2018 14:00), Max: 102.4 (02 May 2018 20:00)  HR: 82 (03 May 2018 16:00) (75 - 99)  BP: 111/55 (03 May 2018 16:00) (88/51 - 150/90)  BP(mean): 77 (03 May 2018 16:00) (65 - 113)  RR: 25 (03 May 2018 16:00) (20 - 31)  SpO2: 100% (03 May 2018 16:00) (97% - 100%)    General: alert  oriented x ____ lethargic agitated                  cachexia  nonverbal  coma    Karnofsky:  %    HEENT: normal  dry mouth  ET tube/trach    Lungs: comfortable tachypnea/labored breathing  excessive secretions    CV: normal  tachycardia    GI: normal  distended  tender  no BS               PEG/NG/OG tube  constipation  last BM:     : normal  incontinent  oliguria/anuria  bush    MSK: normal  weakness  edema             ambulatory  bedbound/wheelchair bound    Skin: normal  pressure ulcers- Stage_____  no rash    LABS:                          11.3   8.8   )-----------( 324      ( 03 May 2018 04:24 )             35.2     05-03    146<H>  |  104  |  22.0<H>  ----------------------------<  148<H>  4.5   |  28.0  |  0.56    Ca    9.8      03 May 2018 04:24  Phos  3.7     05-03  Mg     2.7     05-03    TPro  6.8  /  Alb  2.6<L>  /  TBili  0.3<L>  /  DBili  x   /  AST  63<H>  /  ALT  87<H>  /  AlkPhos  191<H>  05-02        I&O's Summary    02 May 2018 07:01  -  03 May 2018 07:00  --------------------------------------------------------  IN: 1740 mL / OUT: 820 mL / NET: 920 mL    03 May 2018 07:01  -  03 May 2018 16:22  --------------------------------------------------------  IN: 620 mL / OUT: 140 mL / NET: 480 mL        RADIOLOGY & ADDITIONAL STUDIES:    ADVANCE DIRECTIVES:   DNR YES NO  Completed on:                     MOLST  YES NO   Completed on:  Living Will  YES NO   Completed on: CC: patient being seen for pontine hemorrhage and vent dependence     BRIEF HOSPITAL COURSE:    Present Symptoms:     Dyspnea: respiratory failure   Nausea/Vomiting: unable   Anxiety:  unable   Depression: unable   Fatigue: unable   Loss of appetite: unable     Pain: none             Character-            Duration-            Effect-            Factors-            Frequency-            Location-            Severity-    Review of Systems: Reviewed                    Unable to obtain due to poor mentation   All others negative    MEDICATIONS  (STANDING):  amLODIPine   Tablet 10 milliGRAM(s) Oral daily  bisacodyl Suppository 10 milliGRAM(s) Rectal daily  chlorhexidine 0.12% Liquid 15 milliLiter(s) Swish and Spit two times a day  enoxaparin Injectable 40 milliGRAM(s) SubCutaneous daily  folic acid 1 milliGRAM(s) Oral daily  hydrALAZINE 25 milliGRAM(s) Oral every 8 hours  labetalol 200 milliGRAM(s) Oral every 8 hours  pantoprazole   Suspension 40 milliGRAM(s) Oral before lunch  petrolatum Ophthalmic Ointment 1 Application(s) Both EYES every 6 hours  senna 2 Tablet(s) Oral at bedtime  thiamine 100 milliGRAM(s) Oral daily    MEDICATIONS  (PRN):  acetaminophen    Suspension 650 milliGRAM(s) Oral every 6 hours PRN For Temp greater than 38 C (100.4 F)  hydrALAZINE Injectable 20 milliGRAM(s) IV Push every 6 hours PRN systolic > 150  labetalol Injectable 20 milliGRAM(s) IV Push every 2 hours PRN systolic > 150  lactulose Syrup 20 Gram(s) Oral every 6 hours PRN Constpation  polyethylene glycol 3350 17 Gram(s) Oral daily PRN Constipation    PHYSICAL EXAM:    Vital Signs Last 24 Hrs  T(C): 38 (03 May 2018 14:00), Max: 39.1 (02 May 2018 20:00)  T(F): 100.4 (03 May 2018 14:00), Max: 102.4 (02 May 2018 20:00)  HR: 82 (03 May 2018 16:00) (75 - 99)  BP: 111/55 (03 May 2018 16:00) (88/51 - 150/90)  BP(mean): 77 (03 May 2018 16:00) (65 - 113)  RR: 25 (03 May 2018 16:00) (20 - 31)  SpO2: 100% (03 May 2018 16:00) (97% - 100%)    General: lethargic                  Karnofsky:  20 %    HEENT: ET tube    Lungs: comfortable    CV: normal      GI: nontender     : eleazar    MSK: weakness     Skin:  no rash    LABS:                      11.3   8.8   )-----------( 324      ( 03 May 2018 04:24 )             35.2     05-03    146<H>  |  104  |  22.0<H>  ----------------------------<  148<H>  4.5   |  28.0  |  0.56    Ca    9.8      03 May 2018 04:24  Phos  3.7     05-03  Mg     2.7     05-03    TPro  6.8  /  Alb  2.6<L>  /  TBili  0.3<L>  /  DBili  x   /  AST  63<H>  /  ALT  87<H>  /  AlkPhos  191<H>  05-02    I&O's Summary    02 May 2018 07:01  -  03 May 2018 07:00  --------------------------------------------------------  IN: 1740 mL / OUT: 820 mL / NET: 920 mL    03 May 2018 07:01  -  03 May 2018 16:22  --------------------------------------------------------  IN: 620 mL / OUT: 140 mL / NET: 480 mL    RADIOLOGY & ADDITIONAL STUDIES:    ADVANCE DIRECTIVES: Full Code CC: patient being seen for pontine hemorrhage and vent dependence     BRIEF HOSPITAL COURSE: doing poorly, still unresponsive     Present Symptoms:     Dyspnea: respiratory failure   Nausea/Vomiting: unable   Anxiety:  unable   Depression: unable   Fatigue: unable   Loss of appetite: unable     Pain: none             Character-            Duration-            Effect-            Factors-            Frequency-            Location-            Severity-    Review of Systems: Reviewed                    Unable to obtain due to poor mentation   All others negative    MEDICATIONS  (STANDING):  amLODIPine   Tablet 10 milliGRAM(s) Oral daily  bisacodyl Suppository 10 milliGRAM(s) Rectal daily  chlorhexidine 0.12% Liquid 15 milliLiter(s) Swish and Spit two times a day  enoxaparin Injectable 40 milliGRAM(s) SubCutaneous daily  folic acid 1 milliGRAM(s) Oral daily  hydrALAZINE 25 milliGRAM(s) Oral every 8 hours  labetalol 200 milliGRAM(s) Oral every 8 hours  pantoprazole   Suspension 40 milliGRAM(s) Oral before lunch  petrolatum Ophthalmic Ointment 1 Application(s) Both EYES every 6 hours  senna 2 Tablet(s) Oral at bedtime  thiamine 100 milliGRAM(s) Oral daily    MEDICATIONS  (PRN):  acetaminophen    Suspension 650 milliGRAM(s) Oral every 6 hours PRN For Temp greater than 38 C (100.4 F)  hydrALAZINE Injectable 20 milliGRAM(s) IV Push every 6 hours PRN systolic > 150  labetalol Injectable 20 milliGRAM(s) IV Push every 2 hours PRN systolic > 150  lactulose Syrup 20 Gram(s) Oral every 6 hours PRN Constpation  polyethylene glycol 3350 17 Gram(s) Oral daily PRN Constipation    PHYSICAL EXAM:    Vital Signs Last 24 Hrs  T(C): 38 (03 May 2018 14:00), Max: 39.1 (02 May 2018 20:00)  T(F): 100.4 (03 May 2018 14:00), Max: 102.4 (02 May 2018 20:00)  HR: 82 (03 May 2018 16:00) (75 - 99)  BP: 111/55 (03 May 2018 16:00) (88/51 - 150/90)  BP(mean): 77 (03 May 2018 16:00) (65 - 113)  RR: 25 (03 May 2018 16:00) (20 - 31)  SpO2: 100% (03 May 2018 16:00) (97% - 100%)    General: lethargic                  Karnofsky:  20 %    HEENT: ET tube    Lungs: comfortable    CV: normal      GI: nontender     : eleazar    MSK: weakness     Skin:  no rash    LABS:                      11.3   8.8   )-----------( 324      ( 03 May 2018 04:24 )             35.2     05-03    146<H>  |  104  |  22.0<H>  ----------------------------<  148<H>  4.5   |  28.0  |  0.56    Ca    9.8      03 May 2018 04:24  Phos  3.7     05-03  Mg     2.7     05-03    TPro  6.8  /  Alb  2.6<L>  /  TBili  0.3<L>  /  DBili  x   /  AST  63<H>  /  ALT  87<H>  /  AlkPhos  191<H>  05-02    I&O's Summary    02 May 2018 07:01  -  03 May 2018 07:00  --------------------------------------------------------  IN: 1740 mL / OUT: 820 mL / NET: 920 mL    03 May 2018 07:01  -  03 May 2018 16:22  --------------------------------------------------------  IN: 620 mL / OUT: 140 mL / NET: 480 mL    RADIOLOGY & ADDITIONAL STUDIES:    ADVANCE DIRECTIVES: Full Code

## 2018-05-03 NOTE — PROGRESS NOTE ADULT - SUBJECTIVE AND OBJECTIVE BOX
Patient is a 63y old  Female who presents with a chief complaint of complained of dizzyness and collapsed at home (23 Apr 2018 10:59)      BRIEF HOSPITAL COURSE: ***    Events last 24 hours: ***    PAST MEDICAL & SURGICAL HISTORY:  Alcohol abuse  No significant past surgical history      ROS :  Unable to obtain      Medications:    amLODIPine   Tablet 10 milliGRAM(s) Oral daily  hydrALAZINE 25 milliGRAM(s) Oral every 8 hours  hydrALAZINE Injectable 20 milliGRAM(s) IV Push every 6 hours PRN  labetalol 200 milliGRAM(s) Oral every 8 hours  labetalol Injectable 20 milliGRAM(s) IV Push every 2 hours PRN      acetaminophen    Suspension 650 milliGRAM(s) Oral every 6 hours PRN      enoxaparin Injectable 40 milliGRAM(s) SubCutaneous daily    bisacodyl Suppository 10 milliGRAM(s) Rectal daily  lactulose Syrup 20 Gram(s) Oral every 6 hours PRN  pantoprazole   Suspension 40 milliGRAM(s) Oral before lunch  polyethylene glycol 3350 17 Gram(s) Oral daily PRN  senna 2 Tablet(s) Oral at bedtime        folic acid 1 milliGRAM(s) Oral daily  thiamine 100 milliGRAM(s) Oral daily      chlorhexidine 0.12% Liquid 15 milliLiter(s) Swish and Spit two times a day  petrolatum Ophthalmic Ointment 1 Application(s) Both EYES every 6 hours        Mode: CPAP with PS  FiO2: 30  PEEP: 5  PS: 10      ICU Vital Signs Last 24 Hrs  T(C): 37.6 (03 May 2018 05:00), Max: 39.1 (02 May 2018 20:00)  T(F): 99.7 (03 May 2018 05:00), Max: 102.4 (02 May 2018 20:00)  HR: 87 (03 May 2018 07:00) (66 - 99)  BP: 107/73 (03 May 2018 07:00) (88/51 - 150/90)  BP(mean): 85 (03 May 2018 07:00) (65 - 113)  ABP: --  ABP(mean): --  RR: 30 (03 May 2018 07:00) (20 - 39)  SpO2: 100% (03 May 2018 07:00) (97% - 100%)          I&O's Detail    02 May 2018 07:01  -  03 May 2018 07:00  --------------------------------------------------------  IN:    Enteral Tube Flush: 600 mL    Jevity: 1080 mL  Total IN: 1680 mL    OUT:    Indwelling Catheter - Urethral: 820 mL  Total OUT: 820 mL    Total NET: 860 mL            LABS:                        11.3   8.8   )-----------( 324      ( 03 May 2018 04:24 )             35.2     05-03    146<H>  |  104  |  22.0<H>  ----------------------------<  148<H>  4.5   |  28.0  |  0.56    Ca    9.8      03 May 2018 04:24  Phos  3.7     05-03  Mg     2.7     05-03    TPro  6.8  /  Alb  2.6<L>  /  TBili  0.3<L>  /  DBili  x   /  AST  63<H>  /  ALT  87<H>  /  AlkPhos  191<H>  05-02          CAPILLARY BLOOD GLUCOSE            CULTURES:      Physical Examination:    General: No acute distress.     HEENT: Pupils equal, reactive to light.  Symmetric.    PULM: Clear to auscultation bilaterally, no significant sputum production    CVS: Regular rate and rhythm, no murmurs, rubs, or gallops    ABD: Soft, nondistended, nontender, normoactive bowel sounds, no masses    EXT: No edema, nontender    SKIN: Warm and well perfused, no rashes noted.    NEURO:    CRITICAL CARE TIME SPENT: 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, respiratory failure, requiring cardene drip for BP control. Cardene weaned off. Remains on vent with poor neuro exam. 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 Cardene weaned off. Remains on vent with poor neuro exam.     Events last 24 hours: no acute issues    PAST MEDICAL & SURGICAL HISTORY:  Alcohol abuse  No significant past surgical history      ROS :  Unable to obtain      Medications:    amLODIPine   Tablet 10 milliGRAM(s) Oral daily  hydrALAZINE 25 milliGRAM(s) Oral every 8 hours  hydrALAZINE Injectable 20 milliGRAM(s) IV Push every 6 hours PRN  labetalol 200 milliGRAM(s) Oral every 8 hours  labetalol Injectable 20 milliGRAM(s) IV Push every 2 hours PRN      acetaminophen    Suspension 650 milliGRAM(s) Oral every 6 hours PRN      enoxaparin Injectable 40 milliGRAM(s) SubCutaneous daily    bisacodyl Suppository 10 milliGRAM(s) Rectal daily  lactulose Syrup 20 Gram(s) Oral every 6 hours PRN  pantoprazole   Suspension 40 milliGRAM(s) Oral before lunch  polyethylene glycol 3350 17 Gram(s) Oral daily PRN  senna 2 Tablet(s) Oral at bedtime        folic acid 1 milliGRAM(s) Oral daily  thiamine 100 milliGRAM(s) Oral daily      chlorhexidine 0.12% Liquid 15 milliLiter(s) Swish and Spit two times a day  petrolatum Ophthalmic Ointment 1 Application(s) Both EYES every 6 hours        Mode: CPAP with PS  FiO2: 30  PEEP: 5  PS: 10      ICU Vital Signs Last 24 Hrs  T(C): 37.6 (03 May 2018 05:00), Max: 39.1 (02 May 2018 20:00)  T(F): 99.7 (03 May 2018 05:00), Max: 102.4 (02 May 2018 20:00)  HR: 87 (03 May 2018 07:00) (66 - 99)  BP: 107/73 (03 May 2018 07:00) (88/51 - 150/90)  BP(mean): 85 (03 May 2018 07:00) (65 - 113)  ABP: --  ABP(mean): --  RR: 30 (03 May 2018 07:00) (20 - 39)  SpO2: 100% (03 May 2018 07:00) (97% - 100%)          I&O's Detail    02 May 2018 07:01  -  03 May 2018 07:00  --------------------------------------------------------  IN:    Enteral Tube Flush: 600 mL    Jevity: 1080 mL  Total IN: 1680 mL    OUT:    Indwelling Catheter - Urethral: 820 mL  Total OUT: 820 mL    Total NET: 860 mL            LABS:                        11.3   8.8   )-----------( 324      ( 03 May 2018 04:24 )             35.2     05-03    146<H>  |  104  |  22.0<H>  ----------------------------<  148<H>  4.5   |  28.0  |  0.56    Ca    9.8      03 May 2018 04:24  Phos  3.7     05-03  Mg     2.7     05-03    TPro  6.8  /  Alb  2.6<L>  /  TBili  0.3<L>  /  DBili  x   /  AST  63<H>  /  ALT  87<H>  /  AlkPhos  191<H>  05-02          CAPILLARY BLOOD GLUCOSE            CULTURES:      Physical Examination:    General: No acute distress.     HEENT: Pupils equal, reactive to light.  Symmetric.    PULM: Clear to auscultation bilaterally, no significant sputum production    CVS: Regular rate and rhythm, no murmurs, rubs, or gallops    ABD: Soft, nondistended, nontender, normoactive bowel sounds, no masses    EXT: No edema, nontender    SKIN: Warm and well perfused, no rashes noted.    NEURO:    CRITICAL CARE TIME SPENT: 35 min

## 2018-05-03 NOTE — PROGRESS NOTE ADULT - PROBLEM SELECTOR PLAN 2
-  leaning towards withdrawal but we need to meet with patient's mom and sister again to discuss as a family.

## 2018-05-03 NOTE — PROGRESS NOTE ADULT - PROBLEM SELECTOR PLAN 7
continued discussions with  Russell and extended family. They are leaning toward palliative extubation but have yet to definitively decide.

## 2018-05-03 NOTE — PROGRESS NOTE ADULT - SUBJECTIVE AND OBJECTIVE BOX
Patient is a 63y old  Female who presents with a chief complaint of complained of dizzyness and collapsed at home (23 Apr 2018 10:59)      64 y/o F with a h/o EtOH abuse, admitted on 4/23 with dizziness and syncope. Upon arrival in the ED she was obtunded and intubated for airway support. Her BP was noted to be elevated. 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 There was no evidence of aneurysm or dissection noted on CTA. IV Cardene initiated for BP control. Poor neurological exam. Neurosurgery not recommending surgical intervention.    Events last 24 hours: Patient remains intubated, tolerating PSV 10/5. Neuro exam remains unchanged. Persistently febrile.    PAST MEDICAL & SURGICAL HISTORY:  Alcohol abuse  No significant past surgical history      Review of Systems: unable to obtain as patient is intubated        Medications:    amLODIPine   Tablet 10 milliGRAM(s) Oral daily  hydrALAZINE 25 milliGRAM(s) Oral every 8 hours  hydrALAZINE Injectable 20 milliGRAM(s) IV Push every 6 hours PRN  labetalol 200 milliGRAM(s) Oral every 8 hours  labetalol Injectable 20 milliGRAM(s) IV Push every 2 hours PRN  acetaminophen    Suspension 650 milliGRAM(s) Oral every 6 hours PRN  enoxaparin Injectable 40 milliGRAM(s) SubCutaneous daily  bisacodyl Suppository 10 milliGRAM(s) Rectal daily  lactulose Syrup 20 Gram(s) Oral every 6 hours PRN  pantoprazole   Suspension 40 milliGRAM(s) Oral before lunch  polyethylene glycol 3350 17 Gram(s) Oral daily PRN  senna 2 Tablet(s) Oral at bedtime  folic acid 1 milliGRAM(s) Oral daily  thiamine 100 milliGRAM(s) Oral daily  chlorhexidine 0.12% Liquid 15 milliLiter(s) Swish and Spit two times a day  petrolatum Ophthalmic Ointment 1 Application(s) Both EYES every 6 hours        Mode: CPAP with PS  FiO2: 30  PEEP: 5  PS: 10  MAP: 9      ICU Vital Signs Last 24 Hrs  T(C): 38.4 (03 May 2018 20:00), Max: 39 (02 May 2018 21:00)  T(F): 101.1 (03 May 2018 20:00), Max: 102.2 (02 May 2018 21:00)  HR: 82 (03 May 2018 20:00) (75 - 95)  BP: 111/55 (03 May 2018 20:00) (88/51 - 150/90)  BP(mean): 77 (03 May 2018 20:00) (65 - 113)  ABP: --  ABP(mean): --  RR: 30 (03 May 2018 20:00) (20 - 32)  SpO2: 100% (03 May 2018 20:00) (97% - 100%)          I&O's Detail    02 May 2018 07:01  -  03 May 2018 07:00  --------------------------------------------------------  IN:    Enteral Tube Flush: 600 mL    Jevity: 1140 mL  Total IN: 1740 mL    OUT:    Indwelling Catheter - Urethral: 820 mL  Total OUT: 820 mL    Total NET: 920 mL      03 May 2018 07:01  -  03 May 2018 20:59  --------------------------------------------------------  IN:    Enteral Tube Flush: 200 mL    Jevity: 600 mL  Total IN: 800 mL    OUT:    Indwelling Catheter - Urethral: 165 mL  Total OUT: 165 mL    Total NET: 635 mL            LABS:                        11.3   8.8   )-----------( 324      ( 03 May 2018 04:24 )             35.2     05-03    146<H>  |  104  |  22.0<H>  ----------------------------<  148<H>  4.5   |  28.0  |  0.56    Ca    9.8      03 May 2018 04:24  Phos  3.7     05-03  Mg     2.7     05-03    TPro  6.8  /  Alb  2.6<L>  /  TBili  0.3<L>  /  DBili  x   /  AST  63<H>  /  ALT  87<H>  /  AlkPhos  191<H>  05-02          CAPILLARY BLOOD GLUCOSE            CULTURES:      Physical Examination:    General: No acute distress.  Alert, oriented, interactive, nonfocal    HEENT: Pupils equal, reactive to light.  Symmetric.    PULM: Clear to auscultation bilaterally, no significant sputum production    CVS: Regular rate and rhythm, no murmurs, rubs, or gallops    ABD: Soft, nondistended, nontender, normoactive bowel sounds, no masses    EXT: No edema, nontender    SKIN: Warm and well perfused, no rashes noted.    NEURO: A&Ox3, strength 5/5 all extremities, cranial nerves grossly intact, no focal deficits    RADIOLOGY: ***    CRITICAL CARE TIME SPENT: ***  Evaluating/treating patient, reviewing data/labs/imaging, discussing case with multidisciplinary team, discussing plan/goals of care with patient/family. Non-inclusive of procedure time. Patient is a 63y old  Female who presents with a chief complaint of complained of dizzyness and collapsed at home (23 Apr 2018 10:59)      62 y/o F with a h/o EtOH abuse, admitted on 4/23 with dizziness and syncope. Upon arrival in the ED she was obtunded and intubated for airway support. Her BP was noted to be elevated. 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 There was no evidence of aneurysm or dissection noted on CTA. IV Cardene initiated for BP control. Poor neurological exam. Neurosurgery not recommending surgical intervention.    Events last 24 hours: Patient remains intubated, tolerating PSV 10/5. Neuro exam remains unchanged. Persistently febrile.    PAST MEDICAL & SURGICAL HISTORY:  Alcohol abuse  No significant past surgical history      Review of Systems: unable to obtain as patient is intubated        Medications:    amLODIPine   Tablet 10 milliGRAM(s) Oral daily  hydrALAZINE 25 milliGRAM(s) Oral every 8 hours  hydrALAZINE Injectable 20 milliGRAM(s) IV Push every 6 hours PRN  labetalol 200 milliGRAM(s) Oral every 8 hours  labetalol Injectable 20 milliGRAM(s) IV Push every 2 hours PRN  acetaminophen    Suspension 650 milliGRAM(s) Oral every 6 hours PRN  enoxaparin Injectable 40 milliGRAM(s) SubCutaneous daily  bisacodyl Suppository 10 milliGRAM(s) Rectal daily  lactulose Syrup 20 Gram(s) Oral every 6 hours PRN  pantoprazole   Suspension 40 milliGRAM(s) Oral before lunch  polyethylene glycol 3350 17 Gram(s) Oral daily PRN  senna 2 Tablet(s) Oral at bedtime  folic acid 1 milliGRAM(s) Oral daily  thiamine 100 milliGRAM(s) Oral daily  chlorhexidine 0.12% Liquid 15 milliLiter(s) Swish and Spit two times a day  petrolatum Ophthalmic Ointment 1 Application(s) Both EYES every 6 hours        Mode: CPAP with PS  FiO2: 30  PEEP: 5  PS: 10  MAP: 9      ICU Vital Signs Last 24 Hrs  T(C): 38.4 (03 May 2018 20:00), Max: 39 (02 May 2018 21:00)  T(F): 101.1 (03 May 2018 20:00), Max: 102.2 (02 May 2018 21:00)  HR: 82 (03 May 2018 20:00) (75 - 95)  BP: 111/55 (03 May 2018 20:00) (88/51 - 150/90)  BP(mean): 77 (03 May 2018 20:00) (65 - 113)  ABP: --  ABP(mean): --  RR: 30 (03 May 2018 20:00) (20 - 32)  SpO2: 100% (03 May 2018 20:00) (97% - 100%)          I&O's Detail    02 May 2018 07:01  -  03 May 2018 07:00  --------------------------------------------------------  IN:    Enteral Tube Flush: 600 mL    Jevity: 1140 mL  Total IN: 1740 mL    OUT:    Indwelling Catheter - Urethral: 820 mL  Total OUT: 820 mL    Total NET: 920 mL      03 May 2018 07:01  -  03 May 2018 20:59  --------------------------------------------------------  IN:    Enteral Tube Flush: 200 mL    Jevity: 600 mL  Total IN: 800 mL    OUT:    Indwelling Catheter - Urethral: 165 mL  Total OUT: 165 mL    Total NET: 635 mL            LABS:                        11.3   8.8   )-----------( 324      ( 03 May 2018 04:24 )             35.2     05-03    146<H>  |  104  |  22.0<H>  ----------------------------<  148<H>  4.5   |  28.0  |  0.56    Ca    9.8      03 May 2018 04:24  Phos  3.7     05-03  Mg     2.7     05-03    TPro  6.8  /  Alb  2.6<L>  /  TBili  0.3<L>  /  DBili  x   /  AST  63<H>  /  ALT  87<H>  /  AlkPhos  191<H>  05-02          CAPILLARY BLOOD GLUCOSE            CULTURES:      Physical Examination:    General: No acute distress.  intubated    HEENT: Pupils equal, reactive to light.  Symmetric.    PULM: Coarse bilaterally, significant sputum production    CVS: Regular rate and rhythm, no murmurs, rubs, or gallops    ABD: Soft, nondistended, nontender, normoactive bowel sounds, no masses    EXT: anasarcic, nontender    SKIN: Warm and well perfused, no rashes noted.    NEURO: opens eyes to verbal stimuli, will track but inconsistently, nodding/shaking head 'no' appropriately at this point in time, reports sensation in all 4 extremities, withdraws b/l LE to noxious stimuli, no withdrawal of b/l UE to noxious stimuli, no purposeful movements of extremities at this time      RADIOLOGY:     < from: Xray Chest 1 View- PORTABLE-Urgent (05.02.18 @ 16:21) >  FINDINGS:    Single frontal view of the chest demonstrates OG tube tip is at the GE   junction. Tracheal tube tip is at the thoracic inlet. The lungs are   clear. The cardiomediastinal silhouette is enlarged. No acute osseous   abnormalities. Overlying EKG leads and wiresare noted.    IMPRESSION: NG tube tip at the GE junction. Endotracheal tube tip at the   thoracic inlet. Findings discussed with ICU NOAH Cristina.      < from: CT Head No Cont (04.29.18 @ 13:44) >  FINDINGS:   CT dated 4/28/2018 available for review.    The brain demonstrates unchanged small pontine hemorrhage.   Mild   periventricular white matter ischemia unchanged. No acute cerebral   cortical infarct is seen.  No mass effect is found in the brain.      The ventricles, sulci and basal cisterns appear unremarkable.         The orbits are unremarkable.  The paranasal sinuses are significant for   mucosal thickening and secretions in the BILATERAL maxillary and ethmoid   sinuses. ET tube is noted.  The nasal cavity appears intact.  The   nasopharynx is symmetric.  The central skull base, petrous temporal bones   and calvarium remain intact.      IMPRESSION:   unchanged small pontine hemorrhage.   Mildperiventricular   white matter ischemia unchanged. mucosal thickening and secretions in the   BILATERAL maxillary and ethmoid sinuses. ET tube is noted.          CRITICAL CARE TIME SPENT: 38 mins  Evaluating/treating patient, reviewing data/labs/imaging, discussing case with multidisciplinary team, discussing plan/goals of care with patient/family. Non-inclusive of procedure time.

## 2018-05-03 NOTE — PROGRESS NOTE ADULT - PROBLEM SELECTOR PLAN 1
Cannot exclude relation to uncontrolled hypertension. CTA unremarkable. Repeat CT head showed persistent pontine bleed. BP now more controlled, continue PO antihypertensive medication regimen with hold parameters. Avoid sedative medications if at all possible in order to allow for true neurological exams. Fever persists, continuing to observe off antibiotics. Will send sputum culture and f/u procalcitonin level- there is a chance these fevers are neurogenic in nature, however, there is also a chance she has developed a ventilator associated pneumonia.

## 2018-05-03 NOTE — PROGRESS NOTE ADULT - PROBLEM SELECTOR PLAN 2
tolerating some SBT but poor neuro status precludes extubation  notable increasing secretions would not use scopalamine as it may cloud her sensorium further if further care is desired by family she will need a tracheostmy for secretion management

## 2018-05-03 NOTE — PROGRESS NOTE ADULT - ASSESSMENT
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     1) ICH  Avoid antiplatelet.   Continue blood pressure control.   Supportive care/vent management.  Overall prognosis poor.   Case discussed with  extensively this morning. Patient's condition appears to be deteriorating slowly.  He may be leaning towards withdrawal of ventilatory support instead of trach.    2) Hypertension  Continue blood pressure control.     3) goals of care  had long discussion about goals of care with  today.    he may be leaning towards comfort care    will follow with you    Anirudh Abarca MD PhD   [id number]

## 2018-05-03 NOTE — PROGRESS NOTE ADULT - PROBLEM SELECTOR PLAN 2
Related to ICH and AMS. Continue mechanical ventilation with full support, titrating to maintain SaO2 > 92%. Comfortable off sedation. Has been tolerating PSV 10/5 with periodic tachypnea and copious secretions. Have switched back to full ventilator support to rest for the night, will place back on CPAP in the morning.

## 2018-05-03 NOTE — PROGRESS NOTE ADULT - ASSESSMENT
63F with 63F with pontine hemorrhage, vent dependent respiratory failure, with extremely poor prognosis.

## 2018-05-03 NOTE — PROGRESS NOTE ADULT - ATTENDING COMMENTS
Thank you for the opportunity to assist with the care of this patient.   [facility] [phone].

## 2018-05-03 NOTE — PROGRESS NOTE ADULT - PROBLEM SELECTOR PLAN 3
Not much improvement. Related to ICH. Continue neuro checks. Low threshold for repeat CT head. Hold all sedative agents if at all possible. As per report, ongoing palliative care discussions are leaning towards elective extubation.

## 2018-05-03 NOTE — PROGRESS NOTE ADULT - SUBJECTIVE AND OBJECTIVE BOX
[facility]                                        Neurology at [place]                                 Rima Whiteside, & Saad                                  [address]                                        [place], [place], [address]                                             [phone]        CC/HPI: pontine intracranial hemorrhage. poor neurological status, remains intubated    Interim history:  She has remained on mechanical ventilator since yesterday and remains unresponsive to voice. exam stable from yesterday, when last seen by me    ROS:   Unobtainable due to patient's condition.     MEDICATIONS  (STANDING):  amLODIPine   Tablet 10 milliGRAM(s) Oral daily  bisacodyl Suppository 10 milliGRAM(s) Rectal daily  chlorhexidine 0.12% Liquid 15 milliLiter(s) Swish and Spit two times a day  enoxaparin Injectable 40 milliGRAM(s) SubCutaneous daily  folic acid 1 milliGRAM(s) Oral daily  hydrALAZINE 25 milliGRAM(s) Oral every 8 hours  labetalol 200 milliGRAM(s) Oral every 8 hours  pantoprazole   Suspension 40 milliGRAM(s) Oral before lunch  petrolatum Ophthalmic Ointment 1 Application(s) Both EYES every 6 hours  senna 2 Tablet(s) Oral at bedtime  thiamine 100 milliGRAM(s) Oral daily    MEDICATIONS  (PRN):  acetaminophen    Suspension 650 milliGRAM(s) Oral every 6 hours PRN For Temp greater than 38 C (100.4 F)  hydrALAZINE Injectable 20 milliGRAM(s) IV Push every 6 hours PRN systolic > 150  labetalol Injectable 20 milliGRAM(s) IV Push every 2 hours PRN systolic > 150  lactulose Syrup 20 Gram(s) Oral every 6 hours PRN Constpation  polyethylene glycol 3350 17 Gram(s) Oral daily PRN Constipation    Vital Signs Last 24 Hrs  T(C): 38.7 (03 May 2018 13:00), Max: 39.1 (02 May 2018 20:00)  T(F): 101.6 (03 May 2018 13:00), Max: 102.4 (02 May 2018 20:00)  HR: 85 (03 May 2018 12:18) (66 - 99)  BP: 111/66 (03 May 2018 12:00) (88/51 - 150/90)  BP(mean): 84 (03 May 2018 12:00) (65 - 113)  RR: 31 (03 May 2018 12:00) (20 - 31)  SpO2: 100% (03 May 2018 12:18) (97% - 100%)  Detailed Neurologic Exam:    Mental status: minimal Eye opening,  but not responding to voice. minimal if any vertical roving eye movements.   Not tracking with gaze today.     Cranial nerves: Pupils pinpoint. There is slight blink to threat. There is roving eye movement in the vertical plane. Corneal reflexes absent. Palate and tongue cannot be assessed.     Motor/Sensory:  There is normal bulk and tone.  There is no tremor.  There is minimal extensor posturing bilaterally to stimuli.  no purposeful movement    Reflexes: trace throughout and plantar responses are extensor bilaterally with some weak triple flexion to plantar stimulation.    Cerebellar: Cannot be assessed.    Labs:                           11.3   8.8   )-----------( 324      ( 03 May 2018 04:24 )             35.2   05-03    146<H>  |  104  |  22.0<H>  ----------------------------<  148<H>  4.5   |  28.0  |  0.56    Ca    9.8      03 May 2018 04:24  Phos  3.7     05-03  Mg     2.7     05-03    TPro  6.8  /  Alb  2.6<L>  /  TBili  0.3<L>  /  DBili  x   /  AST  63<H>  /  ALT  87<H>  /  AlkPhos  191<H>  05-02        Rad  Previous neurological studies:  CT head 4/29/18 showed pontine intracranial hemorrhage which is unchanged.

## 2018-05-04 LAB
ANION GAP SERPL CALC-SCNC: 12 MMOL/L — SIGNIFICANT CHANGE UP (ref 5–17)
BUN SERPL-MCNC: 18 MG/DL — SIGNIFICANT CHANGE UP (ref 8–20)
CALCIUM SERPL-MCNC: 9.5 MG/DL — SIGNIFICANT CHANGE UP (ref 8.6–10.2)
CHLORIDE SERPL-SCNC: 102 MMOL/L — SIGNIFICANT CHANGE UP (ref 98–107)
CO2 SERPL-SCNC: 28 MMOL/L — SIGNIFICANT CHANGE UP (ref 22–29)
CREAT SERPL-MCNC: 0.56 MG/DL — SIGNIFICANT CHANGE UP (ref 0.5–1.3)
GLUCOSE SERPL-MCNC: 218 MG/DL — HIGH (ref 70–115)
GRAM STN FLD: SIGNIFICANT CHANGE UP
HCT VFR BLD CALC: 30.1 % — LOW (ref 37–47)
HGB BLD-MCNC: 9.4 G/DL — LOW (ref 12–16)
MAGNESIUM SERPL-MCNC: 2.7 MG/DL — HIGH (ref 1.6–2.6)
MCHC RBC-ENTMCNC: 30.9 PG — SIGNIFICANT CHANGE UP (ref 27–31)
MCHC RBC-ENTMCNC: 31.2 G/DL — LOW (ref 32–36)
MCV RBC AUTO: 99 FL — SIGNIFICANT CHANGE UP (ref 81–99)
PHOSPHATE SERPL-MCNC: 3.7 MG/DL — SIGNIFICANT CHANGE UP (ref 2.4–4.7)
PLATELET # BLD AUTO: 324 K/UL — SIGNIFICANT CHANGE UP (ref 150–400)
POTASSIUM SERPL-MCNC: 3.7 MMOL/L — SIGNIFICANT CHANGE UP (ref 3.5–5.3)
POTASSIUM SERPL-SCNC: 3.7 MMOL/L — SIGNIFICANT CHANGE UP (ref 3.5–5.3)
PROCALCITONIN SERPL-MCNC: 0.97 NG/ML — HIGH (ref 0–0.04)
RBC # BLD: 3.04 M/UL — LOW (ref 4.4–5.2)
RBC # FLD: 15.3 % — SIGNIFICANT CHANGE UP (ref 11–15.6)
SODIUM SERPL-SCNC: 142 MMOL/L — SIGNIFICANT CHANGE UP (ref 135–145)
SPECIMEN SOURCE: SIGNIFICANT CHANGE UP
WBC # BLD: 10.9 K/UL — HIGH (ref 4.8–10.8)
WBC # FLD AUTO: 10.9 K/UL — HIGH (ref 4.8–10.8)

## 2018-05-04 PROCEDURE — 99233 SBSQ HOSP IP/OBS HIGH 50: CPT

## 2018-05-04 PROCEDURE — 99291 CRITICAL CARE FIRST HOUR: CPT

## 2018-05-04 RX ADMIN — ENOXAPARIN SODIUM 40 MILLIGRAM(S): 100 INJECTION SUBCUTANEOUS at 11:40

## 2018-05-04 RX ADMIN — CHLORHEXIDINE GLUCONATE 15 MILLILITER(S): 213 SOLUTION TOPICAL at 17:17

## 2018-05-04 RX ADMIN — Medication 1 APPLICATION(S): at 06:02

## 2018-05-04 RX ADMIN — Medication 650 MILLIGRAM(S): at 04:40

## 2018-05-04 RX ADMIN — Medication 1 APPLICATION(S): at 11:40

## 2018-05-04 RX ADMIN — Medication 1 MILLIGRAM(S): at 11:40

## 2018-05-04 RX ADMIN — Medication 1 APPLICATION(S): at 17:16

## 2018-05-04 RX ADMIN — Medication 650 MILLIGRAM(S): at 16:36

## 2018-05-04 RX ADMIN — Medication 100 MILLIGRAM(S): at 11:39

## 2018-05-04 RX ADMIN — Medication 10 MILLIGRAM(S): at 11:39

## 2018-05-04 RX ADMIN — SENNA PLUS 2 TABLET(S): 8.6 TABLET ORAL at 22:12

## 2018-05-04 RX ADMIN — Medication 25 MILLIGRAM(S): at 22:12

## 2018-05-04 RX ADMIN — Medication 200 MILLIGRAM(S): at 13:09

## 2018-05-04 RX ADMIN — Medication 1 APPLICATION(S): at 23:36

## 2018-05-04 RX ADMIN — Medication 25 MILLIGRAM(S): at 13:09

## 2018-05-04 RX ADMIN — PANTOPRAZOLE SODIUM 40 MILLIGRAM(S): 20 TABLET, DELAYED RELEASE ORAL at 11:39

## 2018-05-04 RX ADMIN — CHLORHEXIDINE GLUCONATE 15 MILLILITER(S): 213 SOLUTION TOPICAL at 06:02

## 2018-05-04 RX ADMIN — Medication 1 APPLICATION(S): at 00:13

## 2018-05-04 NOTE — PROGRESS NOTE ADULT - ATTENDING COMMENTS
Thank you for the opportunity to assist with the care of this patient.   [facility] [phone].

## 2018-05-04 NOTE — PROGRESS NOTE ADULT - SUBJECTIVE AND OBJECTIVE BOX
[facility]                                        Neurology at [place]                                 Rima Whiteside, & Saad                                  [address]                                        [place], [place], [address]                                             [phone]        CC: Pontine intracranial hemorrhage.    HPI:   63y Female who c/o dizziness and then collapsed at home.  She had poor mental status and was intubated in the ED. There was no associated seizure activity.   She was found to have a large brainstem intracranial hemorrhage on CT.     She has remained on mechanical ventilator since admission.     Interim history:  She has remained on mechanical ventilator since yesterday and remains unresponsive.      ROS:   Unobtainable due to patient's condition.     MEDICATIONS  (STANDING):  amLODIPine   Tablet 10 milliGRAM(s) Oral daily  bisacodyl Suppository 10 milliGRAM(s) Rectal daily  chlorhexidine 0.12% Liquid 15 milliLiter(s) Swish and Spit two times a day  enoxaparin Injectable 40 milliGRAM(s) SubCutaneous daily  folic acid 1 milliGRAM(s) Oral daily  hydrALAZINE 25 milliGRAM(s) Oral every 8 hours  labetalol 200 milliGRAM(s) Oral every 8 hours  pantoprazole   Suspension 40 milliGRAM(s) Oral before lunch  petrolatum Ophthalmic Ointment 1 Application(s) Both EYES every 6 hours  senna 2 Tablet(s) Oral at bedtime  thiamine 100 milliGRAM(s) Oral daily      Vital Signs Last 24 Hrs  T(F): 100.6 (04 May 2018 10:00), Max: 102 (03 May 2018 17:00)  HR: 90 (04 May 2018 10:00) (57 - 101)  BP: 104/79 (04 May 2018 10:00) (95/52 - 144/72)  BP(mean): 88 (04 May 2018 10:00) (67 - 104)  RR: 33 (04 May 2018 10:00) (21 - 33)  SpO2: 100% (04 May 2018 10:00) (98% - 100%)    Detailed Neurologic Exam:    Mental status: Eyes open,  but not responding to voice.   Not tracking with gaze today.    Cranial nerves: Pupils pinpoint. There is slight blink to threat. There is roving eye movement in the vertical plane. Corneal reflexes absent. Palate and tongue cannot be assessed.     Motor/Sensory:  There is normal bulk and tone.  There is no tremor.  There is minimal extensor posturing bilaterally to stimuli.  No purposeful movement.    Reflexes: Trace throughout and plantar responses are extensor bilaterally.    Cerebellar: Cannot be tested.    Labs:     05-04    142  |  102  |  18.0  ----------------------------<  218<H>  3.7   |  28.0  |  0.56    Ca    9.5      04 May 2018 06:58  Phos  3.7     05-04  Mg     2.7     05-04                              9.4    10.9  )-----------( 324      ( 04 May 2018 06:58 )             30.1

## 2018-05-04 NOTE — PROGRESS NOTE ADULT - PROBLEM SELECTOR PLAN 3
- lengthy meeting with Dr. Wang, sister Felicity, and niece Buck,  Capo unable to be at meeting, but he was okay with us meeting with them. We again explained that she has decompensated and her chances of having any kind of meaningful neurological recovery are extremely small. Per sister and niece, she never expressed her wishes, but she was a strong independent woman. For right now they are leaning towards trach but acknowledge that is a selfish decision because they don't want to let her her go. They know that the ultimate decision maker is  Capo and if he decided to make her comfortable, they would not be mad at him, they just want to make sure he is making the decisions based on her best interest. Final decision to be made by Capo by tomorrow.

## 2018-05-04 NOTE — PROGRESS NOTE ADULT - SUBJECTIVE AND OBJECTIVE BOX
Patient is a 63y old  Female who presents with a chief complaint of complained of dizzyness and collapsed at home (23 Apr 2018 10:59)      BRIEF HOSPITAL COURSE:  63F admitted to MICU with pontine hemorrhage, respiratory failure, requiring cardene drip for BP control. Cardene weaned off. Remains on vent with poor neuro exam. 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 Cardene weaned off. Remains on vent with poor neuro exam.     Events last 24 hours: copious oral and tracheal secreations    PAST MEDICAL & SURGICAL HISTORY:  Alcohol abuse  No significant past surgical history      Review of Systems:  unable to obtain secondary to stroke      Medications:    amLODIPine   Tablet 10 milliGRAM(s) Oral daily  hydrALAZINE 25 milliGRAM(s) Oral every 8 hours  hydrALAZINE Injectable 20 milliGRAM(s) IV Push every 6 hours PRN  labetalol 200 milliGRAM(s) Oral every 8 hours  labetalol Injectable 20 milliGRAM(s) IV Push every 2 hours PRN      acetaminophen    Suspension 650 milliGRAM(s) Oral every 6 hours PRN      enoxaparin Injectable 40 milliGRAM(s) SubCutaneous daily    bisacodyl Suppository 10 milliGRAM(s) Rectal daily  lactulose Syrup 20 Gram(s) Oral every 6 hours PRN  pantoprazole   Suspension 40 milliGRAM(s) Oral before lunch  polyethylene glycol 3350 17 Gram(s) Oral daily PRN  senna 2 Tablet(s) Oral at bedtime        folic acid 1 milliGRAM(s) Oral daily  thiamine 100 milliGRAM(s) Oral daily      chlorhexidine 0.12% Liquid 15 milliLiter(s) Swish and Spit two times a day  petrolatum Ophthalmic Ointment 1 Application(s) Both EYES every 6 hours        Mode: CPAP with PS  FiO2: 30  PEEP: 5  PS: 5  MAP: 7      ICU Vital Signs Last 24 Hrs  T(C): 38.1 (04 May 2018 10:00), Max: 38.9 (03 May 2018 17:00)  T(F): 100.6 (04 May 2018 10:00), Max: 102 (03 May 2018 17:00)  HR: 90 (04 May 2018 10:00) (57 - 101)  BP: 104/79 (04 May 2018 10:00) (95/52 - 144/72)  BP(mean): 88 (04 May 2018 10:00) (67 - 104)  ABP: --  ABP(mean): --  RR: 33 (04 May 2018 10:00) (21 - 33)  SpO2: 100% (04 May 2018 10:00) (98% - 100%)          I&O's Detail    03 May 2018 07:01  -  04 May 2018 07:00  --------------------------------------------------------  IN:    Enteral Tube Flush: 650 mL    Jevity: 1320 mL  Total IN: 1970 mL    OUT:    Indwelling Catheter - Urethral: 625 mL  Total OUT: 625 mL    Total NET: 1345 mL      04 May 2018 07:01  -  04 May 2018 10:24  --------------------------------------------------------  IN:    Jevity: 180 mL  Total IN: 180 mL    OUT:    Indwelling Catheter - Urethral: 150 mL  Total OUT: 150 mL    Total NET: 30 mL            LABS:                        9.4    10.9  )-----------( 324      ( 04 May 2018 06:58 )             30.1     05-04    142  |  102  |  18.0  ----------------------------<  218<H>  3.7   |  28.0  |  0.56    Ca    9.5      04 May 2018 06:58  Phos  3.7     05-04  Mg     2.7     05-04            CAPILLARY BLOOD GLUCOSE            CULTURES:      Physical Examination:    General: No acute distress.  Alert, oriented, interactive, nonfocal    HEENT: Pupils equal, reactive to light.  Symmetric.    PULM: Clear to auscultation bilaterally, no significant sputum production    CVS: Regular rate and rhythm, no murmurs, rubs, or gallops    ABD: Soft, nondistended, nontender, normoactive bowel sounds, no masses    EXT: No edema, nontender    SKIN: Warm and well perfused, no rashes noted.    RADIOLOGY: ***    CRITICAL CARE TIME SPENT: *** Patient is a 63y old  Female who presents with a chief complaint of complained of dizzyness and collapsed at home (23 Apr 2018 10:59)      BRIEF HOSPITAL COURSE:  63F admitted to MICU with pontine hemorrhage, respiratory failure, requiring cardene drip for BP control. Cardene weaned off. Remains on vent with poor neuro exam. 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 Cardene weaned off. Remains on vent with poor neuro exam.     Events last 24 hours: copious oral and tracheal secreations    PAST MEDICAL & SURGICAL HISTORY:  Alcohol abuse  No significant past surgical history      Review of Systems:  unable to obtain secondary to stroke      Medications:    amLODIPine   Tablet 10 milliGRAM(s) Oral daily  hydrALAZINE 25 milliGRAM(s) Oral every 8 hours  hydrALAZINE Injectable 20 milliGRAM(s) IV Push every 6 hours PRN  labetalol 200 milliGRAM(s) Oral every 8 hours  labetalol Injectable 20 milliGRAM(s) IV Push every 2 hours PRN      acetaminophen    Suspension 650 milliGRAM(s) Oral every 6 hours PRN      enoxaparin Injectable 40 milliGRAM(s) SubCutaneous daily    bisacodyl Suppository 10 milliGRAM(s) Rectal daily  lactulose Syrup 20 Gram(s) Oral every 6 hours PRN  pantoprazole   Suspension 40 milliGRAM(s) Oral before lunch  polyethylene glycol 3350 17 Gram(s) Oral daily PRN  senna 2 Tablet(s) Oral at bedtime        folic acid 1 milliGRAM(s) Oral daily  thiamine 100 milliGRAM(s) Oral daily      chlorhexidine 0.12% Liquid 15 milliLiter(s) Swish and Spit two times a day  petrolatum Ophthalmic Ointment 1 Application(s) Both EYES every 6 hours        Mode: CPAP with PS  FiO2: 30  PEEP: 5  PS: 5  MAP: 7      ICU Vital Signs Last 24 Hrs  T(C): 38.1 (04 May 2018 10:00), Max: 38.9 (03 May 2018 17:00)  T(F): 100.6 (04 May 2018 10:00), Max: 102 (03 May 2018 17:00)  HR: 90 (04 May 2018 10:00) (57 - 101)  BP: 104/79 (04 May 2018 10:00) (95/52 - 144/72)  BP(mean): 88 (04 May 2018 10:00) (67 - 104)  ABP: --  ABP(mean): --  RR: 33 (04 May 2018 10:00) (21 - 33)  SpO2: 100% (04 May 2018 10:00) (98% - 100%)          I&O's Detail    03 May 2018 07:01  -  04 May 2018 07:00  --------------------------------------------------------  IN:    Enteral Tube Flush: 650 mL    Jevity: 1320 mL  Total IN: 1970 mL    OUT:    Indwelling Catheter - Urethral: 625 mL  Total OUT: 625 mL    Total NET: 1345 mL      04 May 2018 07:01  -  04 May 2018 10:24  --------------------------------------------------------  IN:    Jevity: 180 mL  Total IN: 180 mL    OUT:    Indwelling Catheter - Urethral: 150 mL  Total OUT: 150 mL    Total NET: 30 mL            LABS:                        9.4    10.9  )-----------( 324      ( 04 May 2018 06:58 )             30.1     05-04    142  |  102  |  18.0  ----------------------------<  218<H>  3.7   |  28.0  |  0.56    Ca    9.5      04 May 2018 06:58  Phos  3.7     05-04  Mg     2.7     05-04            CAPILLARY BLOOD GLUCOSE            CULTURES:      Physical Examination:    General: No acute distress.  Alert, oriented, interactive, nonfocal    HEENT: Pupils equal, reactive to light.  Symmetric.    PULM: Clear to auscultation bilaterally, no significant sputum production    CVS: Regular rate and rhythm, no murmurs, rubs, or gallops    ABD: Soft, nondistended, nontender, normoactive bowel sounds, no masses    EXT: No edema, nontender    SKIN: Warm and well perfused, no rashes noted.    NEURO: over breathes ventilator moves head back and forth, inconsistant with following commands      CRITICAL CARE TIME SPENT: 45 min

## 2018-05-04 NOTE — PROGRESS NOTE ADULT - ASSESSMENT
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     1) ICH  Avoid antiplatelet.   Continue blood pressure control.   Supportive care/vent management.  Overall prognosis extremely poor.     2) Hypertension  Continue blood pressure control.     Palliative care following.     Case discussed with MICU attending Dr Wang.

## 2018-05-04 NOTE — PROGRESS NOTE ADULT - SUBJECTIVE AND OBJECTIVE BOX
CC: patient seen for respiratory failure, large pontine hemorrhage.      BRIEF HOSPITAL COURSE:    Present Symptoms:     Dyspnea: vented    Nausea/Vomiting: No  Anxiety:  unable   Depression: unable   Fatigue: unable   Loss of appetite: unable     Pain: none             Character-            Duration-            Effect-            Factors-            Frequency-            Location-            Severity-    Review of Systems: Reviewed                     Unable to obtain due to poor mentation   All others negative    MEDICATIONS  (STANDING):  amLODIPine   Tablet 10 milliGRAM(s) Oral daily  bisacodyl Suppository 10 milliGRAM(s) Rectal daily  chlorhexidine 0.12% Liquid 15 milliLiter(s) Swish and Spit two times a day  enoxaparin Injectable 40 milliGRAM(s) SubCutaneous daily  folic acid 1 milliGRAM(s) Oral daily  hydrALAZINE 25 milliGRAM(s) Oral every 8 hours  labetalol 200 milliGRAM(s) Oral every 8 hours  pantoprazole   Suspension 40 milliGRAM(s) Oral before lunch  petrolatum Ophthalmic Ointment 1 Application(s) Both EYES every 6 hours  senna 2 Tablet(s) Oral at bedtime  thiamine 100 milliGRAM(s) Oral daily    MEDICATIONS  (PRN):  acetaminophen    Suspension 650 milliGRAM(s) Oral every 6 hours PRN For Temp greater than 38 C (100.4 F)  hydrALAZINE Injectable 20 milliGRAM(s) IV Push every 6 hours PRN systolic > 150  labetalol Injectable 20 milliGRAM(s) IV Push every 2 hours PRN systolic > 150  lactulose Syrup 20 Gram(s) Oral every 6 hours PRN Constpation  polyethylene glycol 3350 17 Gram(s) Oral daily PRN Constipation    PHYSICAL EXAM:    Vital Signs Last 24 Hrs  T(C): 38.6 (04 May 2018 15:00), Max: 38.7 (03 May 2018 19:00)  T(F): 101.5 (04 May 2018 15:00), Max: 101.7 (03 May 2018 19:00)  HR: 89 (04 May 2018 18:25) (57 - 101)  BP: 102/50 (04 May 2018 18:00) (95/52 - 144/72)  BP(mean): 70 (04 May 2018 18:00) (67 - 104)  RR: 38 (04 May 2018 18:00) (21 - 38)  SpO2: 99% (04 May 2018 18:25) (98% - 100%)    General: lethargic     Karnofsky:  20 %    HEENT: normal      Lungs: comfortable     CV: normal    GI: normal      : eleazar    MSK: weakness      Skin: no rash    LABS:                      9.4    10.9  )-----------( 324      ( 04 May 2018 06:58 )             30.1     05-04    142  |  102  |  18.0  ----------------------------<  218<H>  3.7   |  28.0  |  0.56    Ca    9.5      04 May 2018 06:58  Phos  3.7     05-04  Mg     2.7     05-04    I&O's Summary    03 May 2018 07:01  -  04 May 2018 07:00  --------------------------------------------------------  IN: 1970 mL / OUT: 625 mL / NET: 1345 mL    04 May 2018 07:01  -  04 May 2018 18:46  --------------------------------------------------------  IN: 1120 mL / OUT: 510 mL / NET: 610 mL    RADIOLOGY & ADDITIONAL STUDIES:    ADVANCE DIRECTIVES: Full Code

## 2018-05-04 NOTE — PROGRESS NOTE ADULT - PROBLEM SELECTOR PLAN 7
continued discussions with  [place] and extended family. They are leaning toward palliative extubation but have yet to definitively decide. continued discussions with  [place] and extended family. They are leaning toward palliative extubation but have yet to definitively decide. Family meeting today

## 2018-05-05 DIAGNOSIS — J04.10 ACUTE TRACHEITIS WITHOUT OBSTRUCTION: ICD-10-CM

## 2018-05-05 DIAGNOSIS — F10.10 ALCOHOL ABUSE, UNCOMPLICATED: ICD-10-CM

## 2018-05-05 LAB
ALBUMIN SERPL ELPH-MCNC: 2.3 G/DL — LOW (ref 3.3–5.2)
ALP SERPL-CCNC: 206 U/L — HIGH (ref 40–120)
ALT FLD-CCNC: 75 U/L — HIGH
ANION GAP SERPL CALC-SCNC: 11 MMOL/L — SIGNIFICANT CHANGE UP (ref 5–17)
AST SERPL-CCNC: 65 U/L — HIGH
BILIRUB SERPL-MCNC: 0.2 MG/DL — LOW (ref 0.4–2)
BUN SERPL-MCNC: 17 MG/DL — SIGNIFICANT CHANGE UP (ref 8–20)
CALCIUM SERPL-MCNC: 9.7 MG/DL — SIGNIFICANT CHANGE UP (ref 8.6–10.2)
CHLORIDE SERPL-SCNC: 101 MMOL/L — SIGNIFICANT CHANGE UP (ref 98–107)
CO2 SERPL-SCNC: 31 MMOL/L — HIGH (ref 22–29)
CREAT SERPL-MCNC: 0.57 MG/DL — SIGNIFICANT CHANGE UP (ref 0.5–1.3)
GLUCOSE SERPL-MCNC: 135 MG/DL — HIGH (ref 70–115)
HCT VFR BLD CALC: 30.9 % — LOW (ref 37–47)
HGB BLD-MCNC: 9.8 G/DL — LOW (ref 12–16)
MAGNESIUM SERPL-MCNC: 2.5 MG/DL — SIGNIFICANT CHANGE UP (ref 1.6–2.6)
MCHC RBC-ENTMCNC: 30.9 PG — SIGNIFICANT CHANGE UP (ref 27–31)
MCHC RBC-ENTMCNC: 31.7 G/DL — LOW (ref 32–36)
MCV RBC AUTO: 97.5 FL — SIGNIFICANT CHANGE UP (ref 81–99)
PHOSPHATE SERPL-MCNC: 3.7 MG/DL — SIGNIFICANT CHANGE UP (ref 2.4–4.7)
PLATELET # BLD AUTO: 388 K/UL — SIGNIFICANT CHANGE UP (ref 150–400)
POTASSIUM SERPL-MCNC: 3.8 MMOL/L — SIGNIFICANT CHANGE UP (ref 3.5–5.3)
POTASSIUM SERPL-SCNC: 3.8 MMOL/L — SIGNIFICANT CHANGE UP (ref 3.5–5.3)
PROCALCITONIN SERPL-MCNC: 0.7 NG/ML — HIGH (ref 0–0.04)
PROT SERPL-MCNC: 6.8 G/DL — SIGNIFICANT CHANGE UP (ref 6.6–8.7)
RBC # BLD: 3.17 M/UL — LOW (ref 4.4–5.2)
RBC # FLD: 14.6 % — SIGNIFICANT CHANGE UP (ref 11–15.6)
SODIUM SERPL-SCNC: 143 MMOL/L — SIGNIFICANT CHANGE UP (ref 135–145)
WBC # BLD: 11.2 K/UL — HIGH (ref 4.8–10.8)
WBC # FLD AUTO: 11.2 K/UL — HIGH (ref 4.8–10.8)

## 2018-05-05 PROCEDURE — 99291 CRITICAL CARE FIRST HOUR: CPT

## 2018-05-05 PROCEDURE — 99232 SBSQ HOSP IP/OBS MODERATE 35: CPT

## 2018-05-05 RX ORDER — POTASSIUM CHLORIDE 20 MEQ
10 PACKET (EA) ORAL ONCE
Qty: 0 | Refills: 0 | Status: COMPLETED | OUTPATIENT
Start: 2018-05-05 | End: 2018-05-05

## 2018-05-05 RX ORDER — CEFEPIME 1 G/1
INJECTION, POWDER, FOR SOLUTION INTRAMUSCULAR; INTRAVENOUS
Qty: 0 | Refills: 0 | Status: DISCONTINUED | OUTPATIENT
Start: 2018-05-05 | End: 2018-05-05

## 2018-05-05 RX ORDER — CEFEPIME 1 G/1
1000 INJECTION, POWDER, FOR SOLUTION INTRAMUSCULAR; INTRAVENOUS EVERY 8 HOURS
Qty: 0 | Refills: 0 | Status: DISCONTINUED | OUTPATIENT
Start: 2018-05-05 | End: 2018-05-06

## 2018-05-05 RX ORDER — CEFEPIME 1 G/1
1000 INJECTION, POWDER, FOR SOLUTION INTRAMUSCULAR; INTRAVENOUS ONCE
Qty: 0 | Refills: 0 | Status: COMPLETED | OUTPATIENT
Start: 2018-05-05 | End: 2018-05-05

## 2018-05-05 RX ORDER — CEFEPIME 1 G/1
INJECTION, POWDER, FOR SOLUTION INTRAMUSCULAR; INTRAVENOUS
Qty: 0 | Refills: 0 | Status: DISCONTINUED | OUTPATIENT
Start: 2018-05-05 | End: 2018-05-06

## 2018-05-05 RX ADMIN — Medication 10 MILLIGRAM(S): at 11:35

## 2018-05-05 RX ADMIN — Medication 1 APPLICATION(S): at 17:30

## 2018-05-05 RX ADMIN — Medication 25 MILLIGRAM(S): at 14:18

## 2018-05-05 RX ADMIN — ENOXAPARIN SODIUM 40 MILLIGRAM(S): 100 INJECTION SUBCUTANEOUS at 11:33

## 2018-05-05 RX ADMIN — Medication 100 MILLIGRAM(S): at 11:35

## 2018-05-05 RX ADMIN — Medication 1 APPLICATION(S): at 23:46

## 2018-05-05 RX ADMIN — Medication 200 MILLIGRAM(S): at 14:18

## 2018-05-05 RX ADMIN — Medication 1 APPLICATION(S): at 05:19

## 2018-05-05 RX ADMIN — Medication 650 MILLIGRAM(S): at 11:36

## 2018-05-05 RX ADMIN — Medication 650 MILLIGRAM(S): at 00:40

## 2018-05-05 RX ADMIN — Medication 1 APPLICATION(S): at 11:34

## 2018-05-05 RX ADMIN — Medication 200 MILLIGRAM(S): at 22:27

## 2018-05-05 RX ADMIN — Medication 25 MILLIGRAM(S): at 22:27

## 2018-05-05 RX ADMIN — CHLORHEXIDINE GLUCONATE 15 MILLILITER(S): 213 SOLUTION TOPICAL at 17:30

## 2018-05-05 RX ADMIN — PANTOPRAZOLE SODIUM 40 MILLIGRAM(S): 20 TABLET, DELAYED RELEASE ORAL at 11:34

## 2018-05-05 RX ADMIN — Medication 100 MILLIEQUIVALENT(S): at 10:04

## 2018-05-05 RX ADMIN — SENNA PLUS 2 TABLET(S): 8.6 TABLET ORAL at 22:26

## 2018-05-05 RX ADMIN — CHLORHEXIDINE GLUCONATE 15 MILLILITER(S): 213 SOLUTION TOPICAL at 05:19

## 2018-05-05 RX ADMIN — CEFEPIME 1000 MILLIGRAM(S): 1 INJECTION, POWDER, FOR SOLUTION INTRAMUSCULAR; INTRAVENOUS at 16:20

## 2018-05-05 RX ADMIN — CEFEPIME 1000 MILLIGRAM(S): 1 INJECTION, POWDER, FOR SOLUTION INTRAMUSCULAR; INTRAVENOUS at 23:35

## 2018-05-05 RX ADMIN — Medication 1 MILLIGRAM(S): at 11:34

## 2018-05-05 RX ADMIN — CEFEPIME 1000 MILLIGRAM(S): 1 INJECTION, POWDER, FOR SOLUTION INTRAMUSCULAR; INTRAVENOUS at 10:25

## 2018-05-05 RX ADMIN — AMLODIPINE BESYLATE 10 MILLIGRAM(S): 2.5 TABLET ORAL at 05:19

## 2018-05-05 RX ADMIN — Medication 25 MILLIGRAM(S): at 05:19

## 2018-05-05 NOTE — PROGRESS NOTE ADULT - ASSESSMENT
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   ? Slightly more response than yesterday.     1) ICH  Avoid antiplatelet.   Continue blood pressure control.   Supportive care/vent management.  Overall prognosis remains extremely poor.     2) Hypertension  Continue blood pressure control.     Palliative care following.     Will continue to follow.

## 2018-05-05 NOTE — PROGRESS NOTE ADULT - SUBJECTIVE AND OBJECTIVE BOX
[facility]                                        Neurology [facility] [place]                                 Rima Whiteside, & Saad                                  [address]                                        [place], [place], [address]                                             [phone]        CC: Pontine intracranial hemorrhage     HPI:   63y Female who c/o dizziness and then collapsed at home.  She had poor mental status and was intubated in the ED. There was no associated seizure activity.   She was found to have a large brainstem intracranial hemorrhage on CT.     She has remained on mechanical ventilator since admission.     Interim history:  She has remained on mechanical ventilator since yesterday and remains poorly responsive.    ROS:   Unobtainable due to patient's condition.     MEDICATIONS  (STANDING):  amLODIPine   Tablet 10 milliGRAM(s) Oral daily  bisacodyl Suppository 10 milliGRAM(s) Rectal daily  cefepime  Injectable. 1000 milliGRAM(s) IV Push every 8 hours  cefepime  Injectable.      chlorhexidine 0.12% Liquid 15 milliLiter(s) Swish and Spit two times a day  enoxaparin Injectable 40 milliGRAM(s) SubCutaneous daily  folic acid 1 milliGRAM(s) Oral daily  hydrALAZINE 25 milliGRAM(s) Oral every 8 hours  labetalol 200 milliGRAM(s) Oral every 8 hours  pantoprazole   Suspension 40 milliGRAM(s) Oral before lunch  petrolatum Ophthalmic Ointment 1 Application(s) Both EYES every 6 hours  senna 2 Tablet(s) Oral at bedtime  thiamine 100 milliGRAM(s) Oral daily      Vital Signs Last 24 Hrs  T(F): 101.3 (05 May 2018 12:00), Max: 101.7 (05 May 2018 00:00)  HR: 89 (05 May 2018 12:00) (68 - 102)  BP: 119/61 (05 May 2018 12:00) (99/50 - 143/73)  BP(mean): 84 (05 May 2018 12:00) (70 - 103)  RR: 30 (05 May 2018 12:00) (22 - 40)  SpO2: 100% (05 May 2018 12:00) (97% - 100%)    Detailed Neurologic Exam:    Mental status: Eyes open, Slight tracking with gaze. Appeared to follow a single command today (moved fingers).     Cranial nerves: Pupils pinpoint. There is slight blink to threat. There is roving eye movement in the vertical plane. Corneal reflexes absent. Palate and tongue cannot be assessed.     Motor/Sensory:  There is normal bulk and tone.  There is no tremor.  She moved the fingers of her left hand to command although would not replicate this on repeated trials.     Reflexes: Trace throughout and plantar responses are extensor bilaterally.    Cerebellar: Cannot be tested.    Labs:     05-05    143  |  101  |  17.0  ----------------------------<  135<H>  3.8   |  31.0<H>  |  0.57    Ca    9.7      05 May 2018 05:56  Phos  3.7     05-05  Mg     2.5     05-05    TPro  6.8  /  Alb  2.3<L>  /  TBili  0.2<L>  /  DBili  x   /  AST  65<H>  /  ALT  75<H>  /  AlkPhos  206<H>  05-05                            9.8    11.2  )-----------( 388      ( 05 May 2018 05:56 )             30.9

## 2018-05-05 NOTE — PROGRESS NOTE ADULT - ASSESSMENT
63 yof with ICH, acute respiratory failure, prior ETOH abuse, HTN crisis   Awaiting decision on trach and peg verses comfort measures - will discuss with her  today 63 yof with ICH, acute respiratory failure, prior ETOH abuse, HTN crisis   Awaiting decision on trach and peg verses comfort measures - will discuss with her  today  Updated sister at bedside - she does not want Jodee to suffer and this is her biggest concern

## 2018-05-05 NOTE — PROGRESS NOTE ADULT - SUBJECTIVE AND OBJECTIVE BOX
Patient is a 63y old  Female who presents with a chief complaint of complained of dizzyness and collapsed at home (23 Apr 2018 10:59)    PAST MEDICAL & SURGICAL HISTORY:  Alcohol abuse  No significant past surgical history    FAUSTO BARRETO   63y    Female    BRIEF HOSPITAL COURSE:    Review of Systems:                       All other ROS are negative.    ICU Vital Signs Last 24 Hrs  T(C): 37.7 (05 May 2018 20:00), Max: 38.7 (05 May 2018 00:00)  T(F): 99.9 (05 May 2018 20:00), Max: 101.7 (05 May 2018 00:00)  HR: 82 (05 May 2018 22:00) (68 - 102)  BP: 132/64 (05 May 2018 22:00) (100/57 - 143/73)  BP(mean): 91 (05 May 2018 22:00) (75 - 99)  ABP: --  ABP(mean): --  RR: 30 (05 May 2018 22:00) (22 - 38)  SpO2: 100% (05 May 2018 22:00) (98% - 100%)    Physical Examination:    General:     HEENT:     PULM:     CVS:     ABD:     EXT:     SKIN:     Neuro:      Mode: CPAP with PS  FiO2: 30  PEEP: 5  PS: 5  MAP: 8    Mode: CPAP with PS, FiO2: 30, PEEP: 5, PS: 5, MAP: 8  LABS:                        9.8    11.2  )-----------( 388      ( 05 May 2018 05:56 )             30.9     05-05    143  |  101  |  17.0  ----------------------------<  135<H>  3.8   |  31.0<H>  |  0.57    Ca    9.7      05 May 2018 05:56  Phos  3.7     05-05  Mg     2.5     05-05    TPro  6.8  /  Alb  2.3<L>  /  TBili  0.2<L>  /  DBili  x   /  AST  65<H>  /  ALT  75<H>  /  AlkPhos  206<H>  05-05        CULTURES:  Culture Results:   Numerous Staphylococcus species Identification and susceptibility to  follow.  No Routine respiratory ruddy present  Culture in progress (05-04 @ 04:32)      Medications:  cefepime  Injectable. 1000 milliGRAM(s) IV Push every 8 hours  cefepime  Injectable.      amLODIPine   Tablet 10 milliGRAM(s) Oral daily  hydrALAZINE 25 milliGRAM(s) Oral every 8 hours  hydrALAZINE Injectable 20 milliGRAM(s) IV Push every 6 hours PRN  labetalol 200 milliGRAM(s) Oral every 8 hours  labetalol Injectable 20 milliGRAM(s) IV Push every 2 hours PRN  acetaminophen    Suspension 650 milliGRAM(s) Oral every 6 hours PRN  enoxaparin Injectable 40 milliGRAM(s) SubCutaneous daily  bisacodyl Suppository 10 milliGRAM(s) Rectal daily  lactulose Syrup 20 Gram(s) Oral every 6 hours PRN  pantoprazole   Suspension 40 milliGRAM(s) Oral before lunch  polyethylene glycol 3350 17 Gram(s) Oral daily PRN  senna 2 Tablet(s) Oral at bedtime  folic acid 1 milliGRAM(s) Oral daily  thiamine 100 milliGRAM(s) Oral daily  chlorhexidine 0.12% Liquid 15 milliLiter(s) Swish and Spit two times a day  petrolatum Ophthalmic Ointment 1 Application(s) Both EYES every 6 hours      05-04 @ 07:01  -  05-05 @ 07:00  --------------------------------------------------------  IN: 2240 mL / OUT: 1090 mL / NET: 1150 mL    05-05 @ 07:01  -  05-05 @ 23:21  --------------------------------------------------------  IN: 1340 mL / OUT: 980 mL / NET: 360 mL        RADIOLOGY/IMAGING/ECHO  < from: CT Head No Cont (04.29.18 @ 13:44) >  nchanged small pontine hemorrhage.   Mildperiventricular   white matter ischemia unchanged. mucosal thickening and secretions in the   BILATERAL maxillary and ethmoid sinuses. ET tube is noted.        < from: Xray Chest 1 View- PORTABLE-Urgent (05.02.18 @ 16:21) >  MPRESSION: NG tube tip at the GE junction. Endotracheal tube tip at the   thoracic inlet. Findings discussed with ICU NOAH Cristina.      Assessment/Plan:        Minutes of Critical Care time:   (Reviewing data, imaging, discussing with multidisciplinary team, non inclusive of procedures, discussing goals of care with patient/family) Patient is a 63y old  Female who presents with a chief complaint of complained of dizzyness and collapsed at home (23 Apr 2018 10:59)    PAST MEDICAL & SURGICAL HISTORY:  Alcohol abuse  No significant past surgical history    FAUSTO BARRETO   63y    Female    BRIEF HOSPITAL COURSE:    64 y/o F with a h/o EtOH abuse, admitted on 4/23 with dizziness and syncope. Upon arrival in the ED she was obtunded and intubated for airway support. Her BP was noted to be elevated. 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 There was no evidence of aneurysm or dissection noted on CTA. IV Cardene initiated for BP control. Poor neurological exam. Neurosurgery not recommending surgical intervention.    Still low grade temps         Review of Systems:    UATO                   All other ROS are negative.    ICU Vital Signs Last 24 Hrs  T(C): 37.7 (05 May 2018 20:00), Max: 38.7 (05 May 2018 00:00)  T(F): 99.9 (05 May 2018 20:00), Max: 101.7 (05 May 2018 00:00)  HR: 82 (05 May 2018 22:00) (68 - 102)  BP: 132/64 (05 May 2018 22:00) (100/57 - 143/73)  BP(mean): 91 (05 May 2018 22:00) (75 - 99)  ABP: --  ABP(mean): --  RR: 30 (05 May 2018 22:00) (22 - 38)  SpO2: 100% (05 May 2018 22:00) (98% - 100%)    Physical Examination:    General:  unresponsive     HEENT:  pupils 1mm sluggish  some nystagmus bila    PULM: bilateral bs     CVS: s1 s2 reg    ABD: soft some distention    EXT: + edema     SKIN: warm    Neuro: as above .        Mode: CPAP with PS  FiO2: 30  PEEP: 5  PS: 5  MAP: 8    Mode: CPAP with PS, FiO2: 30, PEEP: 5, PS: 5, MAP: 8  LABS:                        9.8    11.2  )-----------( 388      ( 05 May 2018 05:56 )             30.9     05-05    143  |  101  |  17.0  ----------------------------<  135<H>  3.8   |  31.0<H>  |  0.57    Ca    9.7      05 May 2018 05:56  Phos  3.7     05-05  Mg     2.5     05-05    TPro  6.8  /  Alb  2.3<L>  /  TBili  0.2<L>  /  DBili  x   /  AST  65<H>  /  ALT  75<H>  /  AlkPhos  206<H>  05-05        CULTURES:  Culture Results:   Numerous Staphylococcus species Identification and susceptibility to  follow.  No Routine respiratory ruddy present  Culture in progress (05-04 @ 04:32)      Medications:  cefepime  Injectable. 1000 milliGRAM(s) IV Push every 8 hours  cefepime  Injectable.      amLODIPine   Tablet 10 milliGRAM(s) Oral daily  hydrALAZINE 25 milliGRAM(s) Oral every 8 hours  hydrALAZINE Injectable 20 milliGRAM(s) IV Push every 6 hours PRN  labetalol 200 milliGRAM(s) Oral every 8 hours  labetalol Injectable 20 milliGRAM(s) IV Push every 2 hours PRN  acetaminophen    Suspension 650 milliGRAM(s) Oral every 6 hours PRN  enoxaparin Injectable 40 milliGRAM(s) SubCutaneous daily  bisacodyl Suppository 10 milliGRAM(s) Rectal daily  lactulose Syrup 20 Gram(s) Oral every 6 hours PRN  pantoprazole   Suspension 40 milliGRAM(s) Oral before lunch  polyethylene glycol 3350 17 Gram(s) Oral daily PRN  senna 2 Tablet(s) Oral at bedtime  folic acid 1 milliGRAM(s) Oral daily  thiamine 100 milliGRAM(s) Oral daily  chlorhexidine 0.12% Liquid 15 milliLiter(s) Swish and Spit two times a day  petrolatum Ophthalmic Ointment 1 Application(s) Both EYES every 6 hours      05-04 @ 07:01  -  05-05 @ 07:00  --------------------------------------------------------  IN: 2240 mL / OUT: 1090 mL / NET: 1150 mL    05-05 @ 07:01  -  05-05 @ 23:21  --------------------------------------------------------  IN: 1340 mL / OUT: 980 mL / NET: 360 mL        RADIOLOGY/IMAGING/ECHO  < from: CT Head No Cont (04.29.18 @ 13:44) >  nchanged small pontine hemorrhage.   Mildperiventricular   white matter ischemia unchanged. mucosal thickening and secretions in the   BILATERAL maxillary and ethmoid sinuses. ET tube is noted.        < from: Xray Chest 1 View- PORTABLE-Urgent (05.02.18 @ 16:21) >  MPRESSION: NG tube tip at the GE junction. Endotracheal tube tip at the   thoracic inlet. Findings discussed with ICU NOAH Cristina.      Assessment/Plan:    64 y/o F with a h/o EtOH abuse, with acute ICH, acute respiratory failure requiring intubation, AMS, hypertensive emergency.    Pontine bleed.  Poor neuro exam.  Tolerates CPAP, MS precludes extubation.      Febrile with marginal PCT. ?? VAP (lots of secretions) sputum culture with staph.   Perhaps neuro fever.  Continuing ABX    Looks like family will proceed with trach/peg.     BP controlled on enteric meds  DVT prophylaxis            Minutes of Critical Care time: 31  (Reviewing data, imaging, discussing with multidisciplinary team, non inclusive of procedures, discussing goals of care with patient/family)

## 2018-05-05 NOTE — PROGRESS NOTE ADULT - SUBJECTIVE AND OBJECTIVE BOX
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 Cardene weaned off. Remains on vent with poor neuro exam. 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 Cardene weaned off. Remains on vent with poor neuro exam.     Events last 24 hours: febrile copious secretions    PAST MEDICAL & SURGICAL HISTORY:  Alcohol abuse  No significant past surgical history      Review of Systems:  unable to obtain secondary to stroke      Medications:  cefepime   IVPB      amLODIPine   Tablet 10 milliGRAM(s) Oral daily  hydrALAZINE 25 milliGRAM(s) Oral every 8 hours  hydrALAZINE Injectable 20 milliGRAM(s) IV Push every 6 hours PRN  labetalol 200 milliGRAM(s) Oral every 8 hours  labetalol Injectable 20 milliGRAM(s) IV Push every 2 hours PRN      acetaminophen    Suspension 650 milliGRAM(s) Oral every 6 hours PRN      enoxaparin Injectable 40 milliGRAM(s) SubCutaneous daily    bisacodyl Suppository 10 milliGRAM(s) Rectal daily  lactulose Syrup 20 Gram(s) Oral every 6 hours PRN  pantoprazole   Suspension 40 milliGRAM(s) Oral before lunch  polyethylene glycol 3350 17 Gram(s) Oral daily PRN  senna 2 Tablet(s) Oral at bedtime        folic acid 1 milliGRAM(s) Oral daily  potassium chloride  10 mEq/100 mL IVPB 10 milliEquivalent(s) IV Intermittent once  thiamine 100 milliGRAM(s) Oral daily      chlorhexidine 0.12% Liquid 15 milliLiter(s) Swish and Spit two times a day  petrolatum Ophthalmic Ointment 1 Application(s) Both EYES every 6 hours        Mode: CPAP with PS  FiO2: 30  PEEP: 5  PS: 5      ICU Vital Signs Last 24 Hrs  T(C): 37.7 (05 May 2018 05:00), Max: 38.7 (05 May 2018 00:00)  T(F): 99.9 (05 May 2018 05:00), Max: 101.7 (05 May 2018 00:00)  HR: 68 (05 May 2018 07:00) (68 - 101)  BP: 113/58 (05 May 2018 07:00) (99/50 - 139/63)  BP(mean): 81 (05 May 2018 07:00) (70 - 103)  ABP: --  ABP(mean): --  RR: 32 (05 May 2018 07:00) (22 - 40)  SpO2: 99% (05 May 2018 07:00) (97% - 100%)          I&O's Detail    04 May 2018 07:01  -  05 May 2018 07:00  --------------------------------------------------------  IN:    Enteral Tube Flush: 860 mL    Jevity: 1380 mL  Total IN: 2240 mL    OUT:    Indwelling Catheter - Urethral: 1090 mL  Total OUT: 1090 mL    Total NET: 1150 mL            LABS:                        9.8    11.2  )-----------( 388      ( 05 May 2018 05:56 )             30.9     05-05    143  |  101  |  17.0  ----------------------------<  135<H>  3.8   |  31.0<H>  |  0.57    Ca    9.7      05 May 2018 05:56  Phos  3.7     05-05  Mg     2.5     05-05    TPro  6.8  /  Alb  2.3<L>  /  TBili  0.2<L>  /  DBili  x   /  AST  65<H>  /  ALT  75<H>  /  AlkPhos  206<H>  05-05          CAPILLARY BLOOD GLUCOSE            CULTURES:      Physical Examination:    General: No acute distress.      HEENT: Pupils equal, reactive to light.  Symmetric.    PULM: Clear to auscultation bilaterally, no significant sputum production    CVS: Regular rate and rhythm, no murmurs, rubs, or gallops    ABD: Soft, nondistended, nontender, normoactive bowel sounds, no masses    EXT: No edema, nontender    SKIN: Warm and well perfused, no rashes noted.      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 Cardene weaned off. Remains on vent with poor neuro exam. 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 Cardene weaned off. Remains on vent with poor neuro exam.     Events last 24 hours: febrile copious secretions    PAST MEDICAL & SURGICAL HISTORY:  Alcohol abuse  No significant past surgical history      Review of Systems:  unable to obtain secondary to stroke      Medications:  cefepime   IVPB      amLODIPine   Tablet 10 milliGRAM(s) Oral daily  hydrALAZINE 25 milliGRAM(s) Oral every 8 hours  hydrALAZINE Injectable 20 milliGRAM(s) IV Push every 6 hours PRN  labetalol 200 milliGRAM(s) Oral every 8 hours  labetalol Injectable 20 milliGRAM(s) IV Push every 2 hours PRN      acetaminophen    Suspension 650 milliGRAM(s) Oral every 6 hours PRN      enoxaparin Injectable 40 milliGRAM(s) SubCutaneous daily    bisacodyl Suppository 10 milliGRAM(s) Rectal daily  lactulose Syrup 20 Gram(s) Oral every 6 hours PRN  pantoprazole   Suspension 40 milliGRAM(s) Oral before lunch  polyethylene glycol 3350 17 Gram(s) Oral daily PRN  senna 2 Tablet(s) Oral at bedtime        folic acid 1 milliGRAM(s) Oral daily  potassium chloride  10 mEq/100 mL IVPB 10 milliEquivalent(s) IV Intermittent once  thiamine 100 milliGRAM(s) Oral daily      chlorhexidine 0.12% Liquid 15 milliLiter(s) Swish and Spit two times a day  petrolatum Ophthalmic Ointment 1 Application(s) Both EYES every 6 hours        Mode: CPAP with PS  FiO2: 30  PEEP: 5  PS: 5      ICU Vital Signs Last 24 Hrs  T(C): 37.7 (05 May 2018 05:00), Max: 38.7 (05 May 2018 00:00)  T(F): 99.9 (05 May 2018 05:00), Max: 101.7 (05 May 2018 00:00)  HR: 68 (05 May 2018 07:00) (68 - 101)  BP: 113/58 (05 May 2018 07:00) (99/50 - 139/63)  BP(mean): 81 (05 May 2018 07:00) (70 - 103)  ABP: --  ABP(mean): --  RR: 32 (05 May 2018 07:00) (22 - 40)  SpO2: 99% (05 May 2018 07:00) (97% - 100%)          I&O's Detail    04 May 2018 07:01  -  05 May 2018 07:00  --------------------------------------------------------  IN:    Enteral Tube Flush: 860 mL    Jevity: 1380 mL  Total IN: 2240 mL    OUT:    Indwelling Catheter - Urethral: 1090 mL  Total OUT: 1090 mL    Total NET: 1150 mL            LABS:                        9.8    11.2  )-----------( 388      ( 05 May 2018 05:56 )             30.9     05-05    143  |  101  |  17.0  ----------------------------<  135<H>  3.8   |  31.0<H>  |  0.57    Ca    9.7      05 May 2018 05:56  Phos  3.7     05-05  Mg     2.5     05-05    TPro  6.8  /  Alb  2.3<L>  /  TBili  0.2<L>  /  DBili  x   /  AST  65<H>  /  ALT  75<H>  /  AlkPhos  206<H>  05-05    CAPILLARY BLOOD GLUCOSE    CULTURES:      Physical Examination:    General: No acute distress.      HEENT: Pupils equal, reactive to light.  Symmetric.    PULM: Clear to auscultation bilaterally, no significant sputum production    CVS: Regular rate and rhythm, no murmurs, rubs, or gallops    ABD: Soft, nondistended, nontender, normoactive bowel sounds, no masses    EXT: No edema, nontender    SKIN: Warm and well perfused, no rashes noted.      CRITICAL CARE TIME SPENT: 35 min

## 2018-05-05 NOTE — CHART NOTE - NSCHARTNOTEFT_GEN_A_CORE
I have tried to call the patients  Capo twice today to discuss goals of care, no answer unable to leave message.

## 2018-05-06 LAB
-  AMPICILLIN/SULBACTAM: SIGNIFICANT CHANGE UP
-  CEFAZOLIN: SIGNIFICANT CHANGE UP
-  CIPROFLOXACIN: SIGNIFICANT CHANGE UP
-  CLINDAMYCIN: SIGNIFICANT CHANGE UP
-  ERYTHROMYCIN: SIGNIFICANT CHANGE UP
-  GENTAMICIN: SIGNIFICANT CHANGE UP
-  LEVOFLOXACIN: SIGNIFICANT CHANGE UP
-  MOXIFLOXACIN(AEROBIC): SIGNIFICANT CHANGE UP
-  OXACILLIN: SIGNIFICANT CHANGE UP
-  RIFAMPIN: SIGNIFICANT CHANGE UP
-  TETRACYCLINE: SIGNIFICANT CHANGE UP
-  TRIMETHOPRIM/SULFAMETHOXAZOLE: SIGNIFICANT CHANGE UP
-  VANCOMYCIN: SIGNIFICANT CHANGE UP
ALBUMIN SERPL ELPH-MCNC: 2.6 G/DL — LOW (ref 3.3–5.2)
ALP SERPL-CCNC: 186 U/L — HIGH (ref 40–120)
ALT FLD-CCNC: 64 U/L — HIGH
ANION GAP SERPL CALC-SCNC: 11 MMOL/L — SIGNIFICANT CHANGE UP (ref 5–17)
AST SERPL-CCNC: 46 U/L — HIGH
BILIRUB SERPL-MCNC: 0.3 MG/DL — LOW (ref 0.4–2)
BUN SERPL-MCNC: 19 MG/DL — SIGNIFICANT CHANGE UP (ref 8–20)
CALCIUM SERPL-MCNC: 10 MG/DL — SIGNIFICANT CHANGE UP (ref 8.6–10.2)
CHLORIDE SERPL-SCNC: 97 MMOL/L — LOW (ref 98–107)
CO2 SERPL-SCNC: 31 MMOL/L — HIGH (ref 22–29)
CREAT SERPL-MCNC: 0.55 MG/DL — SIGNIFICANT CHANGE UP (ref 0.5–1.3)
CULTURE RESULTS: SIGNIFICANT CHANGE UP
GLUCOSE SERPL-MCNC: 201 MG/DL — HIGH (ref 70–115)
HCT VFR BLD CALC: 31.7 % — LOW (ref 37–47)
HGB BLD-MCNC: 9.7 G/DL — LOW (ref 12–16)
MAGNESIUM SERPL-MCNC: 2.5 MG/DL — SIGNIFICANT CHANGE UP (ref 1.8–2.6)
MCHC RBC-ENTMCNC: 30 PG — SIGNIFICANT CHANGE UP (ref 27–31)
MCHC RBC-ENTMCNC: 30.6 G/DL — LOW (ref 32–36)
MCV RBC AUTO: 98.1 FL — SIGNIFICANT CHANGE UP (ref 81–99)
METHOD TYPE: SIGNIFICANT CHANGE UP
ORGANISM # SPEC MICROSCOPIC CNT: SIGNIFICANT CHANGE UP
ORGANISM # SPEC MICROSCOPIC CNT: SIGNIFICANT CHANGE UP
PHOSPHATE SERPL-MCNC: 3.9 MG/DL — SIGNIFICANT CHANGE UP (ref 2.4–4.7)
PLATELET # BLD AUTO: 401 K/UL — HIGH (ref 150–400)
POTASSIUM SERPL-MCNC: 4.7 MMOL/L — SIGNIFICANT CHANGE UP (ref 3.5–5.3)
POTASSIUM SERPL-SCNC: 4.7 MMOL/L — SIGNIFICANT CHANGE UP (ref 3.5–5.3)
PROT SERPL-MCNC: 7.8 G/DL — SIGNIFICANT CHANGE UP (ref 6.6–8.7)
RBC # BLD: 3.23 M/UL — LOW (ref 4.4–5.2)
RBC # FLD: 15.1 % — SIGNIFICANT CHANGE UP (ref 11–15.6)
SODIUM SERPL-SCNC: 139 MMOL/L — SIGNIFICANT CHANGE UP (ref 135–145)
SPECIMEN SOURCE: SIGNIFICANT CHANGE UP
WBC # BLD: 12.6 K/UL — HIGH (ref 4.8–10.8)
WBC # FLD AUTO: 12.6 K/UL — HIGH (ref 4.8–10.8)

## 2018-05-06 PROCEDURE — 99291 CRITICAL CARE FIRST HOUR: CPT

## 2018-05-06 PROCEDURE — 99233 SBSQ HOSP IP/OBS HIGH 50: CPT

## 2018-05-06 RX ORDER — VANCOMYCIN HCL 1 G
1000 VIAL (EA) INTRAVENOUS EVERY 12 HOURS
Qty: 0 | Refills: 0 | Status: DISCONTINUED | OUTPATIENT
Start: 2018-05-06 | End: 2018-05-06

## 2018-05-06 RX ORDER — VANCOMYCIN HCL 1 G
1000 VIAL (EA) INTRAVENOUS ONCE
Qty: 0 | Refills: 0 | Status: COMPLETED | OUTPATIENT
Start: 2018-05-06 | End: 2018-05-06

## 2018-05-06 RX ORDER — CEPHALEXIN 500 MG
500 CAPSULE ORAL
Qty: 0 | Refills: 0 | Status: DISCONTINUED | OUTPATIENT
Start: 2018-05-06 | End: 2018-05-16

## 2018-05-06 RX ORDER — VANCOMYCIN HCL 1 G
VIAL (EA) INTRAVENOUS
Qty: 0 | Refills: 0 | Status: DISCONTINUED | OUTPATIENT
Start: 2018-05-06 | End: 2018-05-06

## 2018-05-06 RX ADMIN — Medication 1 APPLICATION(S): at 18:49

## 2018-05-06 RX ADMIN — AMLODIPINE BESYLATE 10 MILLIGRAM(S): 2.5 TABLET ORAL at 06:23

## 2018-05-06 RX ADMIN — CHLORHEXIDINE GLUCONATE 15 MILLILITER(S): 213 SOLUTION TOPICAL at 06:23

## 2018-05-06 RX ADMIN — Medication 25 MILLIGRAM(S): at 15:32

## 2018-05-06 RX ADMIN — Medication 200 MILLIGRAM(S): at 06:23

## 2018-05-06 RX ADMIN — Medication 100 MILLIGRAM(S): at 11:23

## 2018-05-06 RX ADMIN — Medication 650 MILLIGRAM(S): at 22:32

## 2018-05-06 RX ADMIN — Medication 1 APPLICATION(S): at 06:23

## 2018-05-06 RX ADMIN — Medication 10 MILLIGRAM(S): at 11:22

## 2018-05-06 RX ADMIN — CEFEPIME 1000 MILLIGRAM(S): 1 INJECTION, POWDER, FOR SOLUTION INTRAMUSCULAR; INTRAVENOUS at 06:23

## 2018-05-06 RX ADMIN — ENOXAPARIN SODIUM 40 MILLIGRAM(S): 100 INJECTION SUBCUTANEOUS at 11:23

## 2018-05-06 RX ADMIN — Medication 25 MILLIGRAM(S): at 06:23

## 2018-05-06 RX ADMIN — CEFEPIME 1000 MILLIGRAM(S): 1 INJECTION, POWDER, FOR SOLUTION INTRAMUSCULAR; INTRAVENOUS at 15:32

## 2018-05-06 RX ADMIN — Medication 1 MILLIGRAM(S): at 11:23

## 2018-05-06 RX ADMIN — PANTOPRAZOLE SODIUM 40 MILLIGRAM(S): 20 TABLET, DELAYED RELEASE ORAL at 11:22

## 2018-05-06 RX ADMIN — CHLORHEXIDINE GLUCONATE 15 MILLILITER(S): 213 SOLUTION TOPICAL at 18:49

## 2018-05-06 RX ADMIN — SENNA PLUS 2 TABLET(S): 8.6 TABLET ORAL at 22:16

## 2018-05-06 RX ADMIN — Medication 250 MILLIGRAM(S): at 11:18

## 2018-05-06 RX ADMIN — Medication 200 MILLIGRAM(S): at 15:32

## 2018-05-06 RX ADMIN — Medication 1 APPLICATION(S): at 11:24

## 2018-05-06 RX ADMIN — Medication 500 MILLIGRAM(S): at 18:49

## 2018-05-06 NOTE — PROGRESS NOTE ADULT - SUBJECTIVE AND OBJECTIVE BOX
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 Cardene weaned off. Remains on vent with poor neuro exam. 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 Cardene weaned off. Remains on vent with poor neuro exam.       Events last 24 hours:     PAST MEDICAL & SURGICAL HISTORY:  Alcohol abuse  No significant past surgical history      Review of Systems:  unable to obtain secondary to stroke    Medications:  cefepime  Injectable. 1000 milliGRAM(s) IV Push every 8 hours  cefepime  Injectable.        amLODIPine   Tablet 10 milliGRAM(s) Oral daily  hydrALAZINE 25 milliGRAM(s) Oral every 8 hours  hydrALAZINE Injectable 20 milliGRAM(s) IV Push every 6 hours PRN  labetalol 200 milliGRAM(s) Oral every 8 hours  labetalol Injectable 20 milliGRAM(s) IV Push every 2 hours PRN      acetaminophen    Suspension 650 milliGRAM(s) Oral every 6 hours PRN      enoxaparin Injectable 40 milliGRAM(s) SubCutaneous daily    bisacodyl Suppository 10 milliGRAM(s) Rectal daily  lactulose Syrup 20 Gram(s) Oral every 6 hours PRN  pantoprazole   Suspension 40 milliGRAM(s) Oral before lunch  polyethylene glycol 3350 17 Gram(s) Oral daily PRN  senna 2 Tablet(s) Oral at bedtime        folic acid 1 milliGRAM(s) Oral daily  thiamine 100 milliGRAM(s) Oral daily      chlorhexidine 0.12% Liquid 15 milliLiter(s) Swish and Spit two times a day  petrolatum Ophthalmic Ointment 1 Application(s) Both EYES every 6 hours        Mode: CPAP with PS  FiO2: 30  PEEP: 5  PS: 5  MAP: 13      ICU Vital Signs Last 24 Hrs  T(C): 37.4 (06 May 2018 04:00), Max: 38.5 (05 May 2018 12:00)  T(F): 99.3 (06 May 2018 04:00), Max: 101.3 (05 May 2018 12:00)  HR: 91 (06 May 2018 07:00) (68 - 102)  BP: 124/65 (06 May 2018 07:00) (100/57 - 143/73)  BP(mean): 88 (06 May 2018 07:00) (73 - 99)  ABP: --  ABP(mean): --  RR: 29 (06 May 2018 07:00) (20 - 38)  SpO2: 99% (06 May 2018 07:00) (98% - 100%)          I&O's Detail    05 May 2018 07:01  -  06 May 2018 07:00  --------------------------------------------------------  IN:    Enteral Tube Flush: 800 mL    Jevity: 1440 mL    Solution: 100 mL  Total IN: 2340 mL    OUT:    Indwelling Catheter - Urethral: 1610 mL  Total OUT: 1610 mL    Total NET: 730 mL            LABS:                        9.7    12.6  )-----------( 401      ( 06 May 2018 06:57 )             31.7     05-06    139  |  97<L>  |  19.0  ----------------------------<  201<H>  4.7   |  31.0<H>  |  0.55    Ca    10.0      06 May 2018 06:57  Phos  3.9     05-06  Mg     2.5     05-06    TPro  7.8  /  Alb  2.6<L>  /  TBili  0.3<L>  /  DBili  x   /  AST  46<H>  /  ALT  64<H>  /  AlkPhos  186<H>  05-06          CAPILLARY BLOOD GLUCOSE            CULTURES:  Culture Results:   Numerous Staphylococcus species Identification and susceptibility to  follow.  No Routine respiratory ruddy present  Culture in progress (05-04-18 @ 04:32)      Physical Examination:    General: No acute distress.      HEENT: Pupils equal, reactive to light.  Symmetric.    PULM: Clear to auscultation bilaterally, no significant sputum production    CVS: Regular rate and rhythm, no murmurs, rubs, or gallops    ABD: Soft, nondistended, nontender, normoactive bowel sounds, no masses    EXT: No edema, nontender    SKIN: Warm and well perfused, no rashes noted.        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 Cardene weaned off. Remains on vent with poor neuro exam. 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 Cardene weaned off. Remains on vent with poor neuro exam.       Events last 24 hours: no acute issues    PAST MEDICAL & SURGICAL HISTORY:  Alcohol abuse  No significant past surgical history      Review of Systems:  unable to obtain secondary to stroke    Medications:  cefepime  Injectable. 1000 milliGRAM(s) IV Push every 8 hours  cefepime  Injectable.        amLODIPine   Tablet 10 milliGRAM(s) Oral daily  hydrALAZINE 25 milliGRAM(s) Oral every 8 hours  hydrALAZINE Injectable 20 milliGRAM(s) IV Push every 6 hours PRN  labetalol 200 milliGRAM(s) Oral every 8 hours  labetalol Injectable 20 milliGRAM(s) IV Push every 2 hours PRN      acetaminophen    Suspension 650 milliGRAM(s) Oral every 6 hours PRN      enoxaparin Injectable 40 milliGRAM(s) SubCutaneous daily    bisacodyl Suppository 10 milliGRAM(s) Rectal daily  lactulose Syrup 20 Gram(s) Oral every 6 hours PRN  pantoprazole   Suspension 40 milliGRAM(s) Oral before lunch  polyethylene glycol 3350 17 Gram(s) Oral daily PRN  senna 2 Tablet(s) Oral at bedtime        folic acid 1 milliGRAM(s) Oral daily  thiamine 100 milliGRAM(s) Oral daily      chlorhexidine 0.12% Liquid 15 milliLiter(s) Swish and Spit two times a day  petrolatum Ophthalmic Ointment 1 Application(s) Both EYES every 6 hours        Mode: CPAP with PS  FiO2: 30  PEEP: 5  PS: 5  MAP: 13      ICU Vital Signs Last 24 Hrs  T(C): 37.4 (06 May 2018 04:00), Max: 38.5 (05 May 2018 12:00)  T(F): 99.3 (06 May 2018 04:00), Max: 101.3 (05 May 2018 12:00)  HR: 91 (06 May 2018 07:00) (68 - 102)  BP: 124/65 (06 May 2018 07:00) (100/57 - 143/73)  BP(mean): 88 (06 May 2018 07:00) (73 - 99)  ABP: --  ABP(mean): --  RR: 29 (06 May 2018 07:00) (20 - 38)  SpO2: 99% (06 May 2018 07:00) (98% - 100%)          I&O's Detail    05 May 2018 07:01  -  06 May 2018 07:00  --------------------------------------------------------  IN:    Enteral Tube Flush: 800 mL    Jevity: 1440 mL    Solution: 100 mL  Total IN: 2340 mL    OUT:    Indwelling Catheter - Urethral: 1610 mL  Total OUT: 1610 mL    Total NET: 730 mL            LABS:                        9.7    12.6  )-----------( 401      ( 06 May 2018 06:57 )             31.7     05-06    139  |  97<L>  |  19.0  ----------------------------<  201<H>  4.7   |  31.0<H>  |  0.55    Ca    10.0      06 May 2018 06:57  Phos  3.9     05-06  Mg     2.5     05-06    TPro  7.8  /  Alb  2.6<L>  /  TBili  0.3<L>  /  DBili  x   /  AST  46<H>  /  ALT  64<H>  /  AlkPhos  186<H>  05-06          CAPILLARY BLOOD GLUCOSE            CULTURES:  Culture Results:   Numerous Staphylococcus species Identification and susceptibility to  follow.  No Routine respiratory ruddy present  Culture in progress (05-04-18 @ 04:32)      Physical Examination:    General: No acute distress.      HEENT: Pupils equal, reactive to light.  Symmetric.    PULM: Clear to auscultation bilaterally, no significant sputum production    CVS: Regular rate and rhythm, no murmurs, rubs, or gallops    ABD: Soft, nondistended, nontender, normoactive bowel sounds, no masses    EXT: No edema, nontender    SKIN: Warm and well perfused, no rashes noted.        CRITICAL CARE TIME SPENT: 35 min

## 2018-05-06 NOTE — PROGRESS NOTE ADULT - ASSESSMENT
63 yof with ICH, acute respiratory failure, prior ETOH abuse, HTN crisis 63 yof with ICH, acute respiratory failure, prior ETOH abuse, HTN crisis     reached out to Dr. Rivera today - likely trach and peg will discuss tomorrow

## 2018-05-06 NOTE — PROGRESS NOTE ADULT - ASSESSMENT
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     1) ICH ( intracranial hemorrhage )  Avoid antiplatelet.   Continue blood pressure control.   Supportive care/vent management.  Overall prognosis remains extremely poor.     2) Altered mental status  Secondary to intracranial hemorrhage.     2) Hypertension  Continue blood pressure control.     Palliative care following.

## 2018-05-06 NOTE — PROGRESS NOTE ADULT - SUBJECTIVE AND OBJECTIVE BOX
[facility]                                        Neurology at [facility]                                 Rima Whiteside, & Saad                                  [address]                                        [place], [place], [address]                                             [phone]        CC: Pontine intracranial hemorrhage.    HPI:   63y Female who c/o dizziness and then collapsed at home.  She had poor mental status and was intubated in the ED. There was no associated seizure activity.   She was found to have a large brainstem intracranial hemorrhage on CT.     She has remained on mechanical ventilator since admission.     Interim history:  She has remained on mechanical ventilator since yesterday and remains unresponsive.      ROS:   Unobtainable due to patient's condition.     MEDICATIONS  (STANDING):  amLODIPine   Tablet 10 milliGRAM(s) Oral daily  bisacodyl Suppository 10 milliGRAM(s) Rectal daily  cefepime  Injectable. 1000 milliGRAM(s) IV Push every 8 hours  cefepime  Injectable.      chlorhexidine 0.12% Liquid 15 milliLiter(s) Swish and Spit two times a day  enoxaparin Injectable 40 milliGRAM(s) SubCutaneous daily  folic acid 1 milliGRAM(s) Oral daily  hydrALAZINE 25 milliGRAM(s) Oral every 8 hours  labetalol 200 milliGRAM(s) Oral every 8 hours  pantoprazole   Suspension 40 milliGRAM(s) Oral before lunch  petrolatum Ophthalmic Ointment 1 Application(s) Both EYES every 6 hours  senna 2 Tablet(s) Oral at bedtime  thiamine 100 milliGRAM(s) Oral daily  vancomycin  IVPB 1000 milliGRAM(s) IV Intermittent once  vancomycin  IVPB 1000 milliGRAM(s) IV Intermittent every 12 hours    Vital Signs Last 24 Hrs  T(F): 99.7 (06 May 2018 08:00), Max: 101.3 (05 May 2018 12:00)  HR: 80 (06 May 2018 10:00) (68 - 102)  BP: 121/61 (06 May 2018 10:00) (100/57 - 141/72)  BP(mean): 86 (06 May 2018 10:00) (73 - 97)  RR: 33 (06 May 2018 10:00) (20 - 38)  SpO2: 99% (06 May 2018 10:00) (98% - 100%)    Detailed Neurologic Exam:    Mental status: Eyes open,  but not responding to voice. Not following any instructions.  Not tracking with gaze today.     Cranial nerves: Pupils pinpoint. There is slight blink to threat. There is roving eye movement in the vertical plane. Corneal reflexes absent. Palate and tongue cannot be assessed.     Motor/Sensory:  There is normal bulk and tone.  There is no tremor.  There is minimal extensor posturing bilaterally to stimuli.  No purposeful movement (including fingers on left).    Reflexes: Trace throughout and plantar responses are extensor bilaterally.    Cerebellar: Cannot be tested.    Labs:     05-06    139  |  97<L>  |  19.0  ----------------------------<  201<H>  4.7   |  31.0<H>  |  0.55    Ca    10.0      06 May 2018 06:57  Phos  3.9     05-06  Mg     2.5     05-06    TPro  7.8  /  Alb  2.6<L>  /  TBili  0.3<L>  /  DBili  x   /  AST  46<H>  /  ALT  64<H>  /  AlkPhos  186<H>  05-06                            9.7    12.6  )-----------( 401      ( 06 May 2018 06:57 )             31.7

## 2018-05-07 LAB
ALBUMIN SERPL ELPH-MCNC: 2.3 G/DL — LOW (ref 3.3–5.2)
ALP SERPL-CCNC: 155 U/L — HIGH (ref 40–120)
ALT FLD-CCNC: 53 U/L — HIGH
ANION GAP SERPL CALC-SCNC: 11 MMOL/L — SIGNIFICANT CHANGE UP (ref 5–17)
AST SERPL-CCNC: 39 U/L — HIGH
BILIRUB SERPL-MCNC: <0.2 MG/DL — LOW (ref 0.4–2)
BUN SERPL-MCNC: 18 MG/DL — SIGNIFICANT CHANGE UP (ref 8–20)
CALCIUM SERPL-MCNC: 9.7 MG/DL — SIGNIFICANT CHANGE UP (ref 8.6–10.2)
CHLORIDE SERPL-SCNC: 99 MMOL/L — SIGNIFICANT CHANGE UP (ref 98–107)
CO2 SERPL-SCNC: 30 MMOL/L — HIGH (ref 22–29)
CREAT SERPL-MCNC: 0.54 MG/DL — SIGNIFICANT CHANGE UP (ref 0.5–1.3)
GLUCOSE SERPL-MCNC: 159 MG/DL — HIGH (ref 70–115)
HCT VFR BLD CALC: 29.1 % — LOW (ref 37–47)
HGB BLD-MCNC: 9.1 G/DL — LOW (ref 12–16)
MAGNESIUM SERPL-MCNC: 2.4 MG/DL — SIGNIFICANT CHANGE UP (ref 1.6–2.6)
MCHC RBC-ENTMCNC: 31.1 PG — HIGH (ref 27–31)
MCHC RBC-ENTMCNC: 31.3 G/DL — LOW (ref 32–36)
MCV RBC AUTO: 99.3 FL — HIGH (ref 81–99)
PHOSPHATE SERPL-MCNC: 3.9 MG/DL — SIGNIFICANT CHANGE UP (ref 2.4–4.7)
PLATELET # BLD AUTO: 433 K/UL — HIGH (ref 150–400)
POTASSIUM SERPL-MCNC: 4.6 MMOL/L — SIGNIFICANT CHANGE UP (ref 3.5–5.3)
POTASSIUM SERPL-SCNC: 4.6 MMOL/L — SIGNIFICANT CHANGE UP (ref 3.5–5.3)
PROT SERPL-MCNC: 7 G/DL — SIGNIFICANT CHANGE UP (ref 6.6–8.7)
RBC # BLD: 2.93 M/UL — LOW (ref 4.4–5.2)
RBC # FLD: 14.7 % — SIGNIFICANT CHANGE UP (ref 11–15.6)
SODIUM SERPL-SCNC: 140 MMOL/L — SIGNIFICANT CHANGE UP (ref 135–145)
WBC # BLD: 9.9 K/UL — SIGNIFICANT CHANGE UP (ref 4.8–10.8)
WBC # FLD AUTO: 9.9 K/UL — SIGNIFICANT CHANGE UP (ref 4.8–10.8)

## 2018-05-07 PROCEDURE — 71045 X-RAY EXAM CHEST 1 VIEW: CPT | Mod: 26

## 2018-05-07 PROCEDURE — 99291 CRITICAL CARE FIRST HOUR: CPT

## 2018-05-07 PROCEDURE — 99232 SBSQ HOSP IP/OBS MODERATE 35: CPT

## 2018-05-07 PROCEDURE — 99233 SBSQ HOSP IP/OBS HIGH 50: CPT

## 2018-05-07 RX ORDER — IPRATROPIUM/ALBUTEROL SULFATE 18-103MCG
3 AEROSOL WITH ADAPTER (GRAM) INHALATION EVERY 6 HOURS
Qty: 0 | Refills: 0 | Status: DISCONTINUED | OUTPATIENT
Start: 2018-05-07 | End: 2018-05-08

## 2018-05-07 RX ADMIN — Medication 1 MILLIGRAM(S): at 11:09

## 2018-05-07 RX ADMIN — Medication 3 MILLILITER(S): at 14:44

## 2018-05-07 RX ADMIN — ENOXAPARIN SODIUM 40 MILLIGRAM(S): 100 INJECTION SUBCUTANEOUS at 11:08

## 2018-05-07 RX ADMIN — Medication 200 MILLIGRAM(S): at 06:02

## 2018-05-07 RX ADMIN — Medication 3 MILLILITER(S): at 20:29

## 2018-05-07 RX ADMIN — Medication 25 MILLIGRAM(S): at 06:02

## 2018-05-07 RX ADMIN — Medication 500 MILLIGRAM(S): at 00:29

## 2018-05-07 RX ADMIN — CHLORHEXIDINE GLUCONATE 15 MILLILITER(S): 213 SOLUTION TOPICAL at 18:09

## 2018-05-07 RX ADMIN — PANTOPRAZOLE SODIUM 40 MILLIGRAM(S): 20 TABLET, DELAYED RELEASE ORAL at 11:10

## 2018-05-07 RX ADMIN — Medication 1 APPLICATION(S): at 11:09

## 2018-05-07 RX ADMIN — Medication 1 APPLICATION(S): at 06:03

## 2018-05-07 RX ADMIN — Medication 500 MILLIGRAM(S): at 06:02

## 2018-05-07 RX ADMIN — Medication 1 APPLICATION(S): at 18:09

## 2018-05-07 RX ADMIN — Medication 500 MILLIGRAM(S): at 11:08

## 2018-05-07 RX ADMIN — Medication 10 MILLIGRAM(S): at 18:09

## 2018-05-07 RX ADMIN — Medication 650 MILLIGRAM(S): at 04:35

## 2018-05-07 RX ADMIN — Medication 1 APPLICATION(S): at 00:29

## 2018-05-07 RX ADMIN — SENNA PLUS 2 TABLET(S): 8.6 TABLET ORAL at 22:55

## 2018-05-07 RX ADMIN — CHLORHEXIDINE GLUCONATE 15 MILLILITER(S): 213 SOLUTION TOPICAL at 06:03

## 2018-05-07 RX ADMIN — Medication 1 APPLICATION(S): at 22:54

## 2018-05-07 RX ADMIN — AMLODIPINE BESYLATE 10 MILLIGRAM(S): 2.5 TABLET ORAL at 06:02

## 2018-05-07 RX ADMIN — Medication 500 MILLIGRAM(S): at 18:08

## 2018-05-07 RX ADMIN — Medication 500 MILLIGRAM(S): at 22:55

## 2018-05-07 RX ADMIN — Medication 100 MILLIGRAM(S): at 11:08

## 2018-05-07 NOTE — PROGRESS NOTE ADULT - PROBLEM SELECTOR PLAN 8
met with Capo this morning, he wants to pursue tracheostomy at this point. Continued family support. Palliative care is following.

## 2018-05-07 NOTE — PROGRESS NOTE ADULT - ATTENDING COMMENTS
Thank you for the opportunity to assist with the care of this patient.   [facility] [phone].

## 2018-05-07 NOTE — PROGRESS NOTE ADULT - SUBJECTIVE AND OBJECTIVE BOX
[facility]                                        Neurology at [place]                                 Rima Whiteside & Saad                                  [address]                                        [place], [place], [address]                                             [phone]        CC: Pontine intracranial hemorrhage.    HPI:   63y Female who c/o dizziness and then collapsed at home.  She had poor mental status and was intubated in the ED. There was no associated seizure activity.   She was found to have a large brainstem intracranial hemorrhage on CT.     She has remained on mechanical ventilator since admission.     Interim history:  She has remained on mechanical ventilator since yesterday and remains unresponsive.    ROS:   Unobtainable due to patient's condition.     MEDICATIONS  (STANDING):  amLODIPine   Tablet 10 milliGRAM(s) Oral daily  bisacodyl Suppository 10 milliGRAM(s) Rectal daily  cephalexin 500 milliGRAM(s) Oral four times a day  chlorhexidine 0.12% Liquid 15 milliLiter(s) Swish and Spit two times a day  enoxaparin Injectable 40 milliGRAM(s) SubCutaneous daily  folic acid 1 milliGRAM(s) Oral daily  hydrALAZINE 25 milliGRAM(s) Oral every 8 hours  labetalol 200 milliGRAM(s) Oral every 8 hours  pantoprazole   Suspension 40 milliGRAM(s) Oral before lunch  petrolatum Ophthalmic Ointment 1 Application(s) Both EYES every 6 hours  senna 2 Tablet(s) Oral at bedtime  thiamine 100 milliGRAM(s) Oral daily      Vital Signs Last 24 Hrs  T(F): 100.6 (07 May 2018 07:00), Max: 101.5 (07 May 2018 04:00)  HR: 80 (07 May 2018 09:00) (68 - 92)  BP: 111/55 (07 May 2018 09:00) (99/55 - 135/71)  BP(mean): 77 (07 May 2018 09:00) (72 - 94)  RR: 28 (07 May 2018 09:00) (16 - 38)  SpO2: 100% (07 May 2018 09:00) (99% - 100%)    Detailed Neurologic Exam:    Mental status: Eyes open,  but not responding to voice. Not following any instructions.  Not tracking with gaze today.     Cranial nerves: Pupils pinpoint. There is slight blink to threat. There is roving eye movement in the vertical plane. Corneal reflexes absent. Palate and tongue cannot be assessed.     Motor/Sensory:  There is normal bulk and tone.  There is no tremor.  There is minimal extensor posturing bilaterally to stimuli.  No purposeful movement (including fingers on left).    Reflexes: Trace throughout and plantar responses are extensor bilaterally.    Cerebellar: Cannot be tested.    Labs:     05-07    140  |  99  |  18.0  ----------------------------<  159<H>  4.6   |  30.0<H>  |  0.54    Ca    9.7      07 May 2018 08:19  Phos  3.9     05-07  Mg     2.4     05-07    TPro  7.0  /  Alb  2.3<L>  /  TBili  <0.2<L>  /  DBili  x   /  AST  39<H>  /  ALT  53<H>  /  AlkPhos  155<H>  05-07                            9.7    12.6  )-----------( 401      ( 06 May 2018 06:57 )             31.7

## 2018-05-07 NOTE — PROGRESS NOTE ADULT - PROBLEM SELECTOR PLAN 4
-lengthy discussion again with Capo yesterday. He knows his wife would not want the trach long term but is not ready to let her go at this time. Thus, he has opted to go forth with the tracheostomy at this time and if she does not progress at all and decompensates, at that time he will opt to pursue comfort and end of life care.

## 2018-05-07 NOTE — PROGRESS NOTE ADULT - SUBJECTIVE AND OBJECTIVE BOX
CC: patient being seen for pontine hemorrhage     Present Symptoms:     Dyspnea: vented   Nausea/Vomiting: unable   Anxiety: unable   Depression: unable   Fatigue: unable   Loss of appetite: unable     Pain:             Character-            Duration-            Effect-            Factors-            Frequency-            Location-            Severity-    Review of Systems: Reviewed            Unable to obtain due to poor mentation   All others negative    MEDICATIONS  (STANDING):  amLODIPine   Tablet 10 milliGRAM(s) Oral daily  bisacodyl Suppository 10 milliGRAM(s) Rectal daily  cephalexin 500 milliGRAM(s) Oral four times a day  chlorhexidine 0.12% Liquid 15 milliLiter(s) Swish and Spit two times a day  enoxaparin Injectable 40 milliGRAM(s) SubCutaneous daily  folic acid 1 milliGRAM(s) Oral daily  hydrALAZINE 25 milliGRAM(s) Oral every 8 hours  labetalol 200 milliGRAM(s) Oral every 8 hours  pantoprazole   Suspension 40 milliGRAM(s) Oral before lunch  petrolatum Ophthalmic Ointment 1 Application(s) Both EYES every 6 hours  senna 2 Tablet(s) Oral at bedtime  thiamine 100 milliGRAM(s) Oral daily    MEDICATIONS  (PRN):  acetaminophen    Suspension 650 milliGRAM(s) Oral every 6 hours PRN For Temp greater than 38 C (100.4 F)  hydrALAZINE Injectable 20 milliGRAM(s) IV Push every 6 hours PRN systolic > 150  labetalol Injectable 20 milliGRAM(s) IV Push every 2 hours PRN systolic > 150  lactulose Syrup 20 Gram(s) Oral every 6 hours PRN Constpation  polyethylene glycol 3350 17 Gram(s) Oral daily PRN Constipation    PHYSICAL EXAM:    Vital Signs Last 24 Hrs  T(C): 37.9 (07 May 2018 10:03), Max: 38.6 (07 May 2018 04:00)  T(F): 100.2 (07 May 2018 10:03), Max: 101.5 (07 May 2018 04:00)  HR: 80 (07 May 2018 10:03) (68 - 92)  BP: 109/56 (07 May 2018 10:03) (99/55 - 135/71)  BP(mean): 77 (07 May 2018 09:00) (72 - 94)  RR: 27 (07 May 2018 10:03) (16 - 38)  SpO2: 100% (07 May 2018 10:03) (99% - 100%)    General: lethargic     Karnofsky:  20%    HEENT: ET tube    Lungs: comfortable     CV: normal      GI: normal     : eleazar    MSK: weakness; anasarca     Skin: no rash    LABS:                      9.1    9.9   )-----------( 433      ( 07 May 2018 08:19 )             29.1     05-07    140  |  99  |  18.0  ----------------------------<  159<H>  4.6   |  30.0<H>  |  0.54    Ca    9.7      07 May 2018 08:19  Phos  3.9     05-07  Mg     2.4     05-07    TPro  7.0  /  Alb  2.3<L>  /  TBili  <0.2<L>  /  DBili  x   /  AST  39<H>  /  ALT  53<H>  /  AlkPhos  155<H>  05-07    I&O's Summary    06 May 2018 07:01  -  07 May 2018 07:00  --------------------------------------------------------  IN: 2540 mL / OUT: 805 mL / NET: 1735 mL    07 May 2018 07:01  -  07 May 2018 10:29  --------------------------------------------------------  IN: 180 mL / OUT: 150 mL / NET: 30 mL    RADIOLOGY & ADDITIONAL STUDIES:    ADVANCE DIRECTIVES: Full Code

## 2018-05-07 NOTE — PROGRESS NOTE ADULT - SUBJECTIVE AND OBJECTIVE BOX
Patient is a 63y old  Female who presents with a chief complaint of complained of dizzyness and collapsed at home (23 Apr 2018 10:59)      BRIEF HOSPITAL COURSE:  63F admitted to MICU with pontine hemorrhage, respiratory failure, requiring cardene drip for BP control. Cardene weaned off. Remains on vent with poor neuro exam. 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 Cardene weaned off. Remains on vent with poor neuro exam.     Events last 24 hours: febrile at 4am, copious oropharyngeal secretions     PAST MEDICAL & SURGICAL HISTORY:  Alcohol abuse  No significant past surgical history      Review of Systems:  Cannot be obtained pt intubated      Medications:  cephalexin 500 milliGRAM(s) Oral four times a day    amLODIPine   Tablet 10 milliGRAM(s) Oral daily  hydrALAZINE 25 milliGRAM(s) Oral every 8 hours  hydrALAZINE Injectable 20 milliGRAM(s) IV Push every 6 hours PRN  labetalol 200 milliGRAM(s) Oral every 8 hours  labetalol Injectable 20 milliGRAM(s) IV Push every 2 hours PRN      acetaminophen    Suspension 650 milliGRAM(s) Oral every 6 hours PRN      enoxaparin Injectable 40 milliGRAM(s) SubCutaneous daily    bisacodyl Suppository 10 milliGRAM(s) Rectal daily  lactulose Syrup 20 Gram(s) Oral every 6 hours PRN  pantoprazole   Suspension 40 milliGRAM(s) Oral before lunch  polyethylene glycol 3350 17 Gram(s) Oral daily PRN  senna 2 Tablet(s) Oral at bedtime        folic acid 1 milliGRAM(s) Oral daily  thiamine 100 milliGRAM(s) Oral daily      chlorhexidine 0.12% Liquid 15 milliLiter(s) Swish and Spit two times a day  petrolatum Ophthalmic Ointment 1 Application(s) Both EYES every 6 hours        Mode: CPAP with PS  FiO2: 30  PEEP: 5  PS: 10  MAP: 10      ICU Vital Signs Last 24 Hrs  T(C): 38.1 (07 May 2018 07:00), Max: 38.6 (07 May 2018 04:00)  T(F): 100.6 (07 May 2018 07:00), Max: 101.5 (07 May 2018 04:00)  HR: 80 (07 May 2018 09:00) (68 - 92)  BP: 111/55 (07 May 2018 09:00) (99/55 - 135/71)  BP(mean): 77 (07 May 2018 09:00) (72 - 94)  ABP: --  ABP(mean): --  RR: 28 (07 May 2018 09:00) (16 - 38)  SpO2: 100% (07 May 2018 09:00) (99% - 100%)          I&O's Detail    06 May 2018 07:01  -  07 May 2018 07:00  --------------------------------------------------------  IN:    Enteral Tube Flush: 900 mL    Jevity: 1440 mL    Oral Fluid: 200 mL  Total IN: 2540 mL    OUT:    Indwelling Catheter - Urethral: 805 mL  Total OUT: 805 mL    Total NET: 1735 mL      07 May 2018 07:01  -  07 May 2018 09:28  --------------------------------------------------------  IN:    Jevity: 180 mL  Total IN: 180 mL    OUT:    Indwelling Catheter - Urethral: 150 mL  Total OUT: 150 mL    Total NET: 30 mL            LABS:                        9.7    12.6  )-----------( 401      ( 06 May 2018 06:57 )             31.7     05-07    140  |  99  |  18.0  ----------------------------<  159<H>  4.6   |  30.0<H>  |  0.54    Ca    9.7      07 May 2018 08:19  Phos  3.9     05-07  Mg     2.4     05-07    TPro  7.0  /  Alb  2.3<L>  /  TBili  <0.2<L>  /  DBili  x   /  AST  39<H>  /  ALT  53<H>  /  AlkPhos  155<H>  05-07          CAPILLARY BLOOD GLUCOSE            CULTURES:  Culture Results:   Numerous Staphylococcus aureus  No Routine respiratory ruddy present (05-04 @ 04:32)      Physical Examination:    General: No acute distress.      HEENT: Pupils equal, reactive to light.  Symmetric.    PULM: coarse to auscultation bilaterally, significant thick yellow green sputum production    CVS: Regular rate and rhythm, no murmurs, rubs, or gallops    ABD: Soft, nondistended, nontender, normoactive bowel sounds, no masses    EXT: anasarca     SKIN: Warm and well perfused, no rashes noted.    NEURO: Awake- tracks to voice, weakly attempts to squeeze hands    RADIOLOGY:     CRITICAL CARE TIME SPENT: 45

## 2018-05-07 NOTE — PROGRESS NOTE ADULT - ASSESSMENT
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     1) ICH ( intracranial hemorrhage )  Avoid antiplatelet.   Continue blood pressure control.   Supportive care/vent management.  Overall prognosis remains poor.   Family has elected for tracheostomy/PEG.    2) Altered mental status  Secondary to intracranial hemorrhage.     2) Hypertension  Continue blood pressure control.     Case discussed with Dr Anton (MICU attending).

## 2018-05-08 DIAGNOSIS — R62.7 ADULT FAILURE TO THRIVE: ICD-10-CM

## 2018-05-08 DIAGNOSIS — R04.0 EPISTAXIS: ICD-10-CM

## 2018-05-08 DIAGNOSIS — Z99.11 DEPENDENCE ON RESPIRATOR [VENTILATOR] STATUS: ICD-10-CM

## 2018-05-08 PROBLEM — F10.10 ALCOHOL ABUSE, UNCOMPLICATED: Chronic | Status: ACTIVE | Noted: 2018-04-23

## 2018-05-08 LAB
ANION GAP SERPL CALC-SCNC: 11 MMOL/L — SIGNIFICANT CHANGE UP (ref 5–17)
APTT BLD: 29.6 SEC — SIGNIFICANT CHANGE UP (ref 27.5–37.4)
BUN SERPL-MCNC: 16 MG/DL — SIGNIFICANT CHANGE UP (ref 8–20)
CALCIUM SERPL-MCNC: 9.7 MG/DL — SIGNIFICANT CHANGE UP (ref 8.6–10.2)
CHLORIDE SERPL-SCNC: 99 MMOL/L — SIGNIFICANT CHANGE UP (ref 98–107)
CO2 SERPL-SCNC: 31 MMOL/L — HIGH (ref 22–29)
CREAT SERPL-MCNC: 0.44 MG/DL — LOW (ref 0.5–1.3)
GLUCOSE SERPL-MCNC: 146 MG/DL — HIGH (ref 70–115)
HCT VFR BLD CALC: 30.4 % — LOW (ref 37–47)
HGB BLD-MCNC: 9.2 G/DL — LOW (ref 12–16)
INR BLD: 1.01 RATIO — SIGNIFICANT CHANGE UP (ref 0.88–1.16)
MAGNESIUM SERPL-MCNC: 2.3 MG/DL — SIGNIFICANT CHANGE UP (ref 1.6–2.6)
MCHC RBC-ENTMCNC: 30.1 PG — SIGNIFICANT CHANGE UP (ref 27–31)
MCHC RBC-ENTMCNC: 30.3 G/DL — LOW (ref 32–36)
MCV RBC AUTO: 99.3 FL — HIGH (ref 81–99)
PHOSPHATE SERPL-MCNC: 3.6 MG/DL — SIGNIFICANT CHANGE UP (ref 2.4–4.7)
PLATELET # BLD AUTO: 427 K/UL — HIGH (ref 150–400)
POTASSIUM SERPL-MCNC: 4.3 MMOL/L — SIGNIFICANT CHANGE UP (ref 3.5–5.3)
POTASSIUM SERPL-SCNC: 4.3 MMOL/L — SIGNIFICANT CHANGE UP (ref 3.5–5.3)
PROTHROM AB SERPL-ACNC: 11.1 SEC — SIGNIFICANT CHANGE UP (ref 9.8–12.7)
RBC # BLD: 3.06 M/UL — LOW (ref 4.4–5.2)
RBC # FLD: 14.8 % — SIGNIFICANT CHANGE UP (ref 11–15.6)
SODIUM SERPL-SCNC: 141 MMOL/L — SIGNIFICANT CHANGE UP (ref 135–145)
WBC # BLD: 12 K/UL — HIGH (ref 4.8–10.8)
WBC # FLD AUTO: 12 K/UL — HIGH (ref 4.8–10.8)

## 2018-05-08 PROCEDURE — 99222 1ST HOSP IP/OBS MODERATE 55: CPT

## 2018-05-08 PROCEDURE — 99291 CRITICAL CARE FIRST HOUR: CPT

## 2018-05-08 PROCEDURE — 99232 SBSQ HOSP IP/OBS MODERATE 35: CPT

## 2018-05-08 RX ADMIN — Medication 3 MILLILITER(S): at 03:40

## 2018-05-08 RX ADMIN — AMLODIPINE BESYLATE 10 MILLIGRAM(S): 2.5 TABLET ORAL at 05:21

## 2018-05-08 RX ADMIN — PANTOPRAZOLE SODIUM 40 MILLIGRAM(S): 20 TABLET, DELAYED RELEASE ORAL at 12:11

## 2018-05-08 RX ADMIN — Medication 1 APPLICATION(S): at 12:11

## 2018-05-08 RX ADMIN — Medication 3 MILLILITER(S): at 08:53

## 2018-05-08 RX ADMIN — CHLORHEXIDINE GLUCONATE 15 MILLILITER(S): 213 SOLUTION TOPICAL at 18:05

## 2018-05-08 RX ADMIN — Medication 100 MILLIGRAM(S): at 12:11

## 2018-05-08 RX ADMIN — Medication 500 MILLIGRAM(S): at 05:21

## 2018-05-08 RX ADMIN — Medication 10 MILLIGRAM(S): at 12:12

## 2018-05-08 RX ADMIN — Medication 1 MILLIGRAM(S): at 12:11

## 2018-05-08 RX ADMIN — Medication 200 MILLIGRAM(S): at 05:17

## 2018-05-08 RX ADMIN — Medication 650 MILLIGRAM(S): at 12:12

## 2018-05-08 RX ADMIN — Medication 1 APPLICATION(S): at 18:05

## 2018-05-08 RX ADMIN — SENNA PLUS 2 TABLET(S): 8.6 TABLET ORAL at 22:07

## 2018-05-08 RX ADMIN — Medication 500 MILLIGRAM(S): at 12:10

## 2018-05-08 RX ADMIN — CHLORHEXIDINE GLUCONATE 15 MILLILITER(S): 213 SOLUTION TOPICAL at 05:22

## 2018-05-08 RX ADMIN — Medication 500 MILLIGRAM(S): at 18:05

## 2018-05-08 RX ADMIN — Medication 1 APPLICATION(S): at 05:22

## 2018-05-08 NOTE — CONSULT NOTE ADULT - PROBLEM SELECTOR RECOMMENDATION 9
Continue medical management as per the primary team. Plan discussed with Dr. Gonzalez for scheduled trach and PEG placement on Thursday 5/9/18.    Pre-op: updated type and screen prior to surgery, ancef 2g on call to OR unless patient is already on antibiotics. NPO after midnight for surgery on 5/9. Continue medical management as per the primary team. Plan discussed with Dr. Gonzalez for scheduled trach and PEG placement on Thursday 5/10/18.    Pre-op: updated type and screen prior to surgery, ancef 2g on call to OR unless patient is already on antibiotics. NPO after midnight for surgery on 5/10.

## 2018-05-08 NOTE — PROGRESS NOTE ADULT - PROBLEM SELECTOR PLAN 2
spoke with  today at length, agrees to pursue trach  Dr. Gonzalez made aware agrees to pursue trach  Dr. Gonzalez made aware

## 2018-05-08 NOTE — PROGRESS NOTE ADULT - PROBLEM SELECTOR PLAN 8
met with Capo this morning, he wants to pursue tracheostomy at this point. Continued family support. Palliative care is following. h/o - not active issue at present but has complicated family dynamics somewhat

## 2018-05-08 NOTE — PROGRESS NOTE ADULT - SUBJECTIVE AND OBJECTIVE BOX
[facility]                                        Neurology at [place]                                 Rima Whiteside, & Saad                                  [address]                                        [place], [place], [address]                                             [phone]          CC: Pontine intracranial hemorrhage.    HPI:   63y Female who c/o dizziness and then collapsed at home.  She had poor mental status and was intubated in the ED. There was no associated seizure activity.   She was found to have a large brainstem intracranial hemorrhage on CT.     She has remained on mechanical ventilator since admission.     Interim history:  She has remained on mechanical ventilator since yesterday and remains unresponsive.    ROS:   Unobtainable due to patient's condition.     MEDICATIONS  (STANDING):  amLODIPine   Tablet 10 milliGRAM(s) Oral daily  bisacodyl Suppository 10 milliGRAM(s) Rectal daily  cephalexin 500 milliGRAM(s) Oral four times a day  chlorhexidine 0.12% Liquid 15 milliLiter(s) Swish and Spit two times a day  folic acid 1 milliGRAM(s) Oral daily  labetalol 200 milliGRAM(s) Oral every 8 hours  pantoprazole   Suspension 40 milliGRAM(s) Oral before lunch  petrolatum Ophthalmic Ointment 1 Application(s) Both EYES every 6 hours  senna 2 Tablet(s) Oral at bedtime  thiamine 100 milliGRAM(s) Oral daily      Vital Signs Last 24 Hrs  T(F): 99.3 (08 May 2018 10:00), Max: 100.1 (07 May 2018 13:00)  HR: 96 (08 May 2018 11:00) (67 - 96)  BP: 120/95 (08 May 2018 11:00) (108/56 - 134/66)  BP(mean): 104 (08 May 2018 11:00) (76 - 104)  RR: 31 (08 May 2018 11:00) (14 - 42)  SpO2: 99% (08 May 2018 11:00) (96% - 100%)    Detailed Neurologic Exam:    Mental status: Eyes open,  but not responding to voice. Not following any instructions.  Not tracking with gaze today.     Cranial nerves: Pupils pinpoint. There is slight blink to threat. There is roving eye movement in the vertical plane. Corneal reflexes absent. Palate and tongue cannot be assessed.     Motor/Sensory:  There is normal bulk and tone.  There is no tremor.  There is minimal extensor posturing bilaterally to stimuli.  No purposeful movement (including fingers on left).    Reflexes: Trace throughout and plantar responses are extensor bilaterally.    Cerebellar: Cannot be tested.    Labs:     05-08    141  |  99  |  16.0  ----------------------------<  146<H>  4.3   |  31.0<H>  |  0.44<L>    Ca    9.7      08 May 2018 05:18  Phos  3.6     05-08  Mg     2.3     05-08    TPro  7.0  /  Alb  2.3<L>  /  TBili  <0.2<L>  /  DBili  x   /  AST  39<H>  /  ALT  53<H>  /  AlkPhos  155<H>  05-07                            9.2    12.0  )-----------( 427      ( 08 May 2018 05:18 )             30.4

## 2018-05-08 NOTE — PROGRESS NOTE ADULT - ASSESSMENT
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     1) ICH ( intracranial hemorrhage )  Avoid antiplatelet.   Continue blood pressure control.   Supportive care/vent management.  Overall prognosis remains poor.   Family has elected for tracheostomy/PEG.    2) Altered mental status  Secondary to intracranial hemorrhage.     2) Hypertension  Continue blood pressure control.     Case discussed with MICU team on rounds.

## 2018-05-08 NOTE — PROGRESS NOTE ADULT - SUBJECTIVE AND OBJECTIVE BOX
Patient is a 63y old  Female who presents with a chief complaint of complained of dizzyness and collapsed at home (23 Apr 2018 10:59)      BRIEF HOSPITAL COURSE:     Events last 24 hours: low grade temps last yesterday afternoon, continues with copious secretions, bleeding from     PAST MEDICAL & SURGICAL HISTORY:  Alcohol abuse  No significant past surgical history      Review of Systems:  C    Medications:  cephalexin 500 milliGRAM(s) Oral four times a day    amLODIPine   Tablet 10 milliGRAM(s) Oral daily  hydrALAZINE Injectable 20 milliGRAM(s) IV Push every 6 hours PRN  labetalol 200 milliGRAM(s) Oral every 8 hours  labetalol Injectable 20 milliGRAM(s) IV Push every 2 hours PRN    ALBUTerol/ipratropium for Nebulization 3 milliLiter(s) Nebulizer every 6 hours    acetaminophen    Suspension 650 milliGRAM(s) Oral every 6 hours PRN      enoxaparin Injectable 40 milliGRAM(s) SubCutaneous daily    bisacodyl Suppository 10 milliGRAM(s) Rectal daily  lactulose Syrup 20 Gram(s) Oral every 6 hours PRN  pantoprazole   Suspension 40 milliGRAM(s) Oral before lunch  polyethylene glycol 3350 17 Gram(s) Oral daily PRN  senna 2 Tablet(s) Oral at bedtime        folic acid 1 milliGRAM(s) Oral daily  thiamine 100 milliGRAM(s) Oral daily      chlorhexidine 0.12% Liquid 15 milliLiter(s) Swish and Spit two times a day  petrolatum Ophthalmic Ointment 1 Application(s) Both EYES every 6 hours        Mode: CPAP with PS  FiO2: 30  PEEP: 5  PS: 8  MAP: 9      ICU Vital Signs Last 24 Hrs  T(C): 37.3 (08 May 2018 08:00), Max: 37.9 (07 May 2018 10:03)  T(F): 99.1 (08 May 2018 08:00), Max: 100.2 (07 May 2018 10:03)  HR: 76 (08 May 2018 09:00) (67 - 87)  BP: 113/62 (08 May 2018 09:00) (108/56 - 134/66)  BP(mean): 82 (08 May 2018 09:00) (76 - 99)  ABP: --  ABP(mean): --  RR: 30 (08 May 2018 09:00) (14 - 42)  SpO2: 98% (08 May 2018 09:00) (96% - 100%)          I&O's Detail    07 May 2018 07:01  -  08 May 2018 07:00  --------------------------------------------------------  IN:    Enteral Tube Flush: 800 mL    Jevity: 1320 mL  Total IN: 2120 mL    OUT:    Indwelling Catheter - Urethral: 1360 mL  Total OUT: 1360 mL    Total NET: 760 mL      08 May 2018 07:01  -  08 May 2018 09:39  --------------------------------------------------------  IN:  Total IN: 0 mL    OUT:    Indwelling Catheter - Urethral: 160 mL  Total OUT: 160 mL    Total NET: -160 mL            LABS:                        9.2    12.0  )-----------( 427      ( 08 May 2018 05:18 )             30.4     05-08    141  |  99  |  16.0  ----------------------------<  146<H>  4.3   |  31.0<H>  |  0.44<L>    Ca    9.7      08 May 2018 05:18  Phos  3.6     05-08  Mg     2.3     05-08    TPro  7.0  /  Alb  2.3<L>  /  TBili  <0.2<L>  /  DBili  x   /  AST  39<H>  /  ALT  53<H>  /  AlkPhos  155<H>  05-07          CAPILLARY BLOOD GLUCOSE            CULTURES:  Culture Results:   Numerous Staphylococcus aureus  No Routine respiratory ruddy present (05-04 @ 04:32)      Physical Examination:    General: No acute distress.      HEENT: Pupils equal, reactive to light.  Symmetric. bleeding from right nare     PULM: Clear to auscultation bilaterally, no significant sputum production    CVS: Regular rate and rhythm, no murmurs, rubs, or gallops    ABD: Soft, nondistended, nontender, normoactive bowel sounds, no masses    EXT: No edema, nontender    SKIN: Warm and well perfused, no rashes noted.    NEURO:     RADIOLOGY:   < from: Xray Chest 1 View- PORTABLE-Urgent (05.07.18 @ 10:13) >  Comparisons:  Chest x-ray dated 5/2/2018    Findings:     Lungs remain clear. There are no infiltrates, congestion or   pleural effusions. ET tube is in good position at the level the aortic   arch. Nasogastric tube is in the stomach.  .    The pulmonary vasculature   and aorta are normal for age. Heart size is unremarkable.     The thorax is normal for age.    Impression: No acute pulmonary disease. Intubated.    No interval change                OSBALDO KHAN M.D., ATTENDING RADIOLOGIST  This document has been electronically signed. May  7 2018 10:19AM        < end of copied text >    CRITICAL CARE TIME SPENT: 60 Patient is a 63y old  Female who presents with a chief complaint of complained of dizzyness and collapsed at home (23 Apr 2018 10:59)      BRIEF HOSPITAL COURSE:   63F admitted to MICU with pontine hemorrhage, respiratory failure, requiring cardene drip for BP control. Cardene weaned off. Remains on vent with poor neuro exam. 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 Cardene weaned off. Remains on vent with poor neuro exam.     Events last 24 hours: low grade temps last yesterday afternoon, continues with copious secretions, bleeding from right nare    PAST MEDICAL & SURGICAL HISTORY:  Alcohol abuse  No significant past surgical history      Review of Systems:  Cannot be obtained pt intubated     Medications:  cephalexin 500 milliGRAM(s) Oral four times a day    amLODIPine   Tablet 10 milliGRAM(s) Oral daily  hydrALAZINE Injectable 20 milliGRAM(s) IV Push every 6 hours PRN  labetalol 200 milliGRAM(s) Oral every 8 hours  labetalol Injectable 20 milliGRAM(s) IV Push every 2 hours PRN    ALBUTerol/ipratropium for Nebulization 3 milliLiter(s) Nebulizer every 6 hours    acetaminophen    Suspension 650 milliGRAM(s) Oral every 6 hours PRN      enoxaparin Injectable 40 milliGRAM(s) SubCutaneous daily    bisacodyl Suppository 10 milliGRAM(s) Rectal daily  lactulose Syrup 20 Gram(s) Oral every 6 hours PRN  pantoprazole   Suspension 40 milliGRAM(s) Oral before lunch  polyethylene glycol 3350 17 Gram(s) Oral daily PRN  senna 2 Tablet(s) Oral at bedtime        folic acid 1 milliGRAM(s) Oral daily  thiamine 100 milliGRAM(s) Oral daily      chlorhexidine 0.12% Liquid 15 milliLiter(s) Swish and Spit two times a day  petrolatum Ophthalmic Ointment 1 Application(s) Both EYES every 6 hours        Mode: CPAP with PS  FiO2: 30  PEEP: 5  PS: 8  MAP: 9      ICU Vital Signs Last 24 Hrs  T(C): 37.3 (08 May 2018 08:00), Max: 37.9 (07 May 2018 10:03)  T(F): 99.1 (08 May 2018 08:00), Max: 100.2 (07 May 2018 10:03)  HR: 76 (08 May 2018 09:00) (67 - 87)  BP: 113/62 (08 May 2018 09:00) (108/56 - 134/66)  BP(mean): 82 (08 May 2018 09:00) (76 - 99)  ABP: --  ABP(mean): --  RR: 30 (08 May 2018 09:00) (14 - 42)  SpO2: 98% (08 May 2018 09:00) (96% - 100%)          I&O's Detail    07 May 2018 07:01  -  08 May 2018 07:00  --------------------------------------------------------  IN:    Enteral Tube Flush: 800 mL    Jevity: 1320 mL  Total IN: 2120 mL    OUT:    Indwelling Catheter - Urethral: 1360 mL  Total OUT: 1360 mL    Total NET: 760 mL      08 May 2018 07:01  -  08 May 2018 09:39  --------------------------------------------------------  IN:  Total IN: 0 mL    OUT:    Indwelling Catheter - Urethral: 160 mL  Total OUT: 160 mL    Total NET: -160 mL            LABS:                        9.2    12.0  )-----------( 427      ( 08 May 2018 05:18 )             30.4     05-08    141  |  99  |  16.0  ----------------------------<  146<H>  4.3   |  31.0<H>  |  0.44<L>    Ca    9.7      08 May 2018 05:18  Phos  3.6     05-08  Mg     2.3     05-08    TPro  7.0  /  Alb  2.3<L>  /  TBili  <0.2<L>  /  DBili  x   /  AST  39<H>  /  ALT  53<H>  /  AlkPhos  155<H>  05-07          CAPILLARY BLOOD GLUCOSE            CULTURES:  Culture Results:   Numerous Staphylococcus aureus  No Routine respiratory ruddy present (05-04 @ 04:32)      Physical Examination:    General: No acute distress.      HEENT: Pupils equal, reactive to light.  Symmetric. bleeding from right nare     PULM: Coarse to auscultation bilaterally, copious thick yellow green sputum production    CVS: Regular rate and rhythm, no murmurs, rubs, or gallops    ABD: Soft, nondistended, nontender, normoactive bowel sounds, no masses    EXT: anasarca, nontender    SKIN: Warm and well perfused, no rashes noted.    NEURO:  tracks inconsistently to voice/name calling, attempts to squeeze hands    RADIOLOGY:   < from: Xray Chest 1 View- PORTABLE-Urgent (05.07.18 @ 10:13) >  Comparisons:  Chest x-ray dated 5/2/2018    Findings:     Lungs remain clear. There are no infiltrates, congestion or   pleural effusions. ET tube is in good position at the level the aortic   arch. Nasogastric tube is in the stomach.  .    The pulmonary vasculature   and aorta are normal for age. Heart size is unremarkable.     The thorax is normal for age.    Impression: No acute pulmonary disease. Intubated.    No interval change                OBSALDO KHAN M.D., ATTENDING RADIOLOGIST  This document has been electronically signed. May  7 2018 10:19AM        < end of copied text >    CRITICAL CARE TIME SPENT: 60

## 2018-05-08 NOTE — CONSULT NOTE ADULT - ASSESSMENT
63 y.o female with PMH of alcoholism s/p pontine bleed currently intubated since 4/23 with multiple unsuccessful attempts to wean from ventilator.

## 2018-05-08 NOTE — CONSULT NOTE ADULT - ATTENDING COMMENTS
Thank you for the opportunity to assist with the care of this patient.   [facility] [phone].
Pt seen and examined.  Unable to wean from vent.  Will plan for trach/PEG 5/10

## 2018-05-08 NOTE — PROGRESS NOTE ADULT - PROBLEM SELECTOR PLAN 7
h/o - not active issue at present but has complicated family dynamics somewhat MSSA - keflex x 7 days  CXR without infiltrates   plan to trach likely to go to OR Thursday per Dr. Gonzalez

## 2018-05-08 NOTE — PROGRESS NOTE ADULT - PROBLEM SELECTOR PLAN 4
continue labetolol and hydralazine continue labetolol   took hydralazine off yesterday as BP more normal to low

## 2018-05-08 NOTE — PROGRESS NOTE ADULT - PROBLEM SELECTOR PLAN 6
MSSA - keflex x 7 days  check CXR  plan to trach guaze in nare   check coags  high platelet count due to acute phase reaction from tracheitis

## 2018-05-08 NOTE — CONSULT NOTE ADULT - SUBJECTIVE AND OBJECTIVE BOX
63F with a history of alcoholism, who presented to [facility] on 4/23/18 with complaints of dizziness and collapse at home. Arrived to ER obtunded, was intubated for airway support, and admitted to MICU. 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 During inpatient course BP was controlled by primary team, now current issue is inability to wean from vent with poor neuro exam. Thoracic surgery consulted today for trach/peg evaluation.      Past Medical History: Alcohol abuse  Past Surgical History: No significant past surgical history  Allergies: No Known Allergies  Intolerances: None.  Social History:  Alcohol abuse  Family History: No pertinent family history in first degree relatives        REVIEW OF SYSTEMS: Intubated, unable to obtain due to underlying neurologic dysfunction.      MEDICATIONS  (STANDING):  amLODIPine   Tablet 10 milliGRAM(s) Oral daily  bisacodyl Suppository 10 milliGRAM(s) Rectal daily  cephalexin 500 milliGRAM(s) Oral four times a day  chlorhexidine 0.12% Liquid 15 milliLiter(s) Swish and Spit two times a day  folic acid 1 milliGRAM(s) Oral daily  labetalol 200 milliGRAM(s) Oral every 8 hours  pantoprazole   Suspension 40 milliGRAM(s) Oral before lunch  petrolatum Ophthalmic Ointment 1 Application(s) Both EYES every 6 hours  senna 2 Tablet(s) Oral at bedtime  thiamine 100 milliGRAM(s) Oral daily    MEDICATIONS  (PRN):  acetaminophen    Suspension 650 milliGRAM(s) Oral every 6 hours PRN For Temp greater than 38 C (100.4 F)  hydrALAZINE Injectable 20 milliGRAM(s) IV Push every 6 hours PRN systolic > 150  labetalol Injectable 20 milliGRAM(s) IV Push every 2 hours PRN systolic > 150  lactulose Syrup 20 Gram(s) Oral every 6 hours PRN Constpation  polyethylene glycol 3350 17 Gram(s) Oral daily PRN Constipation      Vital Signs Last 24 Hrs  T(C): 37.4 (08 May 2018 12:00), Max: 37.8 (07 May 2018 14:00)  T(F): 99.4 (08 May 2018 12:00), Max: 100 (07 May 2018 14:00)  HR: 88 (08 May 2018 13:00) (67 - 96)  BP: 105/54 (08 May 2018 13:00) (105/54 - 134/66)  BP(mean): 75 (08 May 2018 13:00) (75 - 104)  RR: 32 (08 May 2018 13:00) (14 - 42)  SpO2: 99% (08 May 2018 13:00) (96% - 100%)    General: Intubated, neurologically impaired with active cooling blanket.  Neurology: Awake, Nods head to verbal commands. Withdraws to pain in all extremities except RUE.   ENT: Right nare with saturated packing in place.  Respiratory: CTA B/L, No wheezing, rales, rhonchi on full vent support.  CV: S1S2, no murmurs, rubs or gallops  Abdominal: Soft, NT, ND +BS,   Extremities: No edema, + peripheral pulses    LABS:             9.2    12.0  )-----------( 427      ( 08 May 2018 05:18 )             30.4     05-08    141  |  99  |  16.0  ----------------------------<  146<H>  4.3   |  31.0<H>  |  0.44<L>    Ca    9.7      08 May 2018 05:18  Phos  3.6     05-08  Mg     2.3     05-08    TPro  7.0  /  Alb  2.3<L>  /  TBili  <0.2<L>  /  DBili  x   /  AST  39<H>  /  ALT  53<H>  /  AlkPhos  155<H>  05-07    PT/INR - ( 08 May 2018 11:18 )   PT: 11.1 sec;   INR: 1.01 ratio      PTT - ( 08 May 2018 11:18 )  PTT:29.6 sec      RADIOLOGY & ADDITIONAL STUDIES:  CT Head No Cont (04.29.18 @ 13:44) >  FINDINGS:   CT dated 4/28/2018 available for review.    The brain demonstrates unchanged small pontine hemorrhage.   Mild   periventricular white matter ischemia unchanged. No acute cerebral   cortical infarct is seen.  No mass effect is found in the brain.      The ventricles, sulci and basal cisterns appear unremarkable.         The orbits are unremarkable. The paranasal sinuses are significant for   mucosal thickening and secretions in the BILATERAL maxillary and ethmoid   sinuses. ET tube is noted. The nasal cavity appears intact. The   nasopharynx is symmetric. The central skull base, petrous temporal bones   and calvarium remain intact.    IMPRESSION:   unchanged small pontine hemorrhage.  Mildperiventricular   white matter ischemia unchanged. mucosal thickening and secretions in the   BILATERAL maxillary and ethmoid sinuses. ET tube is noted.      < from: Xray Chest 1 View- PORTABLE-Urgent (05.07.18 @ 10:13) >  Technique:  AP portable    Comparisons:  Chest x-ray dated 5/2/2018    Findings:     Lungs remain clear. There are no infiltrates, congestion or   pleural effusions. ET tube is in good position at the level the aortic   arch. Nasogastric tube is in the stomach.  .    The pulmonary vasculature   and aorta are normal for age. Heart size is unremarkable.     The thorax is normal for age.    Impression: No acute pulmonary disease. Intubated. No interval change. 63F with a history of alcoholism, who presented to [facility] on 4/23/18 with complaints of dizziness and collapse at home. Arrived to ER obtunded, was intubated for airway support, and admitted to MICU. 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 During inpatient course BP was controlled by primary team, now current issue is inability to wean from vent with poor neuro exam. Thoracic surgery consulted today for trach/peg evaluation.      Past Medical History: Alcohol abuse  Past Surgical History: No significant past surgical history  Allergies: No Known Allergies  Intolerances: None.  Social History:  Alcohol abuse  Family History: No pertinent family history in first degree relatives        REVIEW OF SYSTEMS: Intubated, unable to obtain due to underlying neurologic dysfunction.      MEDICATIONS  (STANDING):  amLODIPine   Tablet 10 milliGRAM(s) Oral daily  bisacodyl Suppository 10 milliGRAM(s) Rectal daily  cephalexin 500 milliGRAM(s) Oral four times a day  chlorhexidine 0.12% Liquid 15 milliLiter(s) Swish and Spit two times a day  folic acid 1 milliGRAM(s) Oral daily  labetalol 200 milliGRAM(s) Oral every 8 hours  pantoprazole   Suspension 40 milliGRAM(s) Oral before lunch  petrolatum Ophthalmic Ointment 1 Application(s) Both EYES every 6 hours  senna 2 Tablet(s) Oral at bedtime  thiamine 100 milliGRAM(s) Oral daily    MEDICATIONS  (PRN):  acetaminophen    Suspension 650 milliGRAM(s) Oral every 6 hours PRN For Temp greater than 38 C (100.4 F)  hydrALAZINE Injectable 20 milliGRAM(s) IV Push every 6 hours PRN systolic > 150  labetalol Injectable 20 milliGRAM(s) IV Push every 2 hours PRN systolic > 150  lactulose Syrup 20 Gram(s) Oral every 6 hours PRN Constpation  polyethylene glycol 3350 17 Gram(s) Oral daily PRN Constipation      Vital Signs Last 24 Hrs  T(C): 37.4 (08 May 2018 12:00), Max: 37.8 (07 May 2018 14:00)  T(F): 99.4 (08 May 2018 12:00), Max: 100 (07 May 2018 14:00)  HR: 88 (08 May 2018 13:00) (67 - 96)  BP: 105/54 (08 May 2018 13:00) (105/54 - 134/66)  BP(mean): 75 (08 May 2018 13:00) (75 - 104)  RR: 32 (08 May 2018 13:00) (14 - 42)  SpO2: 99% (08 May 2018 13:00) (96% - 100%)    PE:  General: Intubated, neurologically impaired with active cooling blanket.  Neurology: Awake, Nods head to verbal commands. Withdraws to pain in all extremities except RUE.   ENT: Right nare with saturated packing in place.  Neck: Trachea midline, no surgical scars. Cricoid palpable.  Respiratory: CTA B/L, No wheezing, rales, rhonchi on full vent support.  CV: RRR. S1S2, no murmurs, rubs or gallops  Abdominal: Soft, NT, ND +BS,   Extremities: No edema, + peripheral pulses    LABS:             9.2    12.0  )-----------( 427      ( 08 May 2018 05:18 )             30.4     05-08    141  |  99  |  16.0  ----------------------------<  146<H>  4.3   |  31.0<H>  |  0.44<L>    Ca    9.7      08 May 2018 05:18  Phos  3.6     05-08  Mg     2.3     05-08    TPro  7.0  /  Alb  2.3<L>  /  TBili  <0.2<L>  /  DBili  x   /  AST  39<H>  /  ALT  53<H>  /  AlkPhos  155<H>  05-07    PT/INR - ( 08 May 2018 11:18 )   PT: 11.1 sec;   INR: 1.01 ratio      PTT - ( 08 May 2018 11:18 )  PTT:29.6 sec      RADIOLOGY & ADDITIONAL STUDIES:  CT Head No Cont (04.29.18 @ 13:44) >  FINDINGS:   CT dated 4/28/2018 available for review.    The brain demonstrates unchanged small pontine hemorrhage.   Mild   periventricular white matter ischemia unchanged. No acute cerebral   cortical infarct is seen.  No mass effect is found in the brain.      The ventricles, sulci and basal cisterns appear unremarkable.         The orbits are unremarkable. The paranasal sinuses are significant for   mucosal thickening and secretions in the BILATERAL maxillary and ethmoid   sinuses. ET tube is noted. The nasal cavity appears intact. The   nasopharynx is symmetric. The central skull base, petrous temporal bones   and calvarium remain intact.    IMPRESSION:   unchanged small pontine hemorrhage.  Mildperiventricular   white matter ischemia unchanged. mucosal thickening and secretions in the   BILATERAL maxillary and ethmoid sinuses. ET tube is noted.      < from: Xray Chest 1 View- PORTABLE-Urgent (05.07.18 @ 10:13) >  Technique:  AP portable    Comparisons:  Chest x-ray dated 5/2/2018    Findings:     Lungs remain clear. There are no infiltrates, congestion or   pleural effusions. ET tube is in good position at the level the aortic   arch. Nasogastric tube is in the stomach.  .    The pulmonary vasculature   and aorta are normal for age. Heart size is unremarkable.     The thorax is normal for age.    Impression: No acute pulmonary disease. Intubated. No interval change.

## 2018-05-09 ENCOUNTER — TRANSCRIPTION ENCOUNTER (OUTPATIENT)
Age: 63
End: 2018-05-09

## 2018-05-09 LAB
ALBUMIN SERPL ELPH-MCNC: 2.4 G/DL — LOW (ref 3.3–5.2)
ALP SERPL-CCNC: 121 U/L — HIGH (ref 40–120)
ALT FLD-CCNC: 43 U/L — HIGH
ANION GAP SERPL CALC-SCNC: 10 MMOL/L — SIGNIFICANT CHANGE UP (ref 5–17)
AST SERPL-CCNC: 29 U/L — SIGNIFICANT CHANGE UP
BILIRUB SERPL-MCNC: <0.2 MG/DL — LOW (ref 0.4–2)
BUN SERPL-MCNC: 16 MG/DL — SIGNIFICANT CHANGE UP (ref 8–20)
CALCIUM SERPL-MCNC: 9.3 MG/DL — SIGNIFICANT CHANGE UP (ref 8.6–10.2)
CHLORIDE SERPL-SCNC: 100 MMOL/L — SIGNIFICANT CHANGE UP (ref 98–107)
CO2 SERPL-SCNC: 31 MMOL/L — HIGH (ref 22–29)
CREAT SERPL-MCNC: 0.49 MG/DL — LOW (ref 0.5–1.3)
GLUCOSE SERPL-MCNC: 155 MG/DL — HIGH (ref 70–115)
HCT VFR BLD CALC: 28.9 % — LOW (ref 37–47)
HGB BLD-MCNC: 9 G/DL — LOW (ref 12–16)
MAGNESIUM SERPL-MCNC: 2.2 MG/DL — SIGNIFICANT CHANGE UP (ref 1.6–2.6)
MCHC RBC-ENTMCNC: 30.9 PG — SIGNIFICANT CHANGE UP (ref 27–31)
MCHC RBC-ENTMCNC: 31.1 G/DL — LOW (ref 32–36)
MCV RBC AUTO: 99.3 FL — HIGH (ref 81–99)
PHOSPHATE SERPL-MCNC: 3.1 MG/DL — SIGNIFICANT CHANGE UP (ref 2.4–4.7)
PLATELET # BLD AUTO: 468 K/UL — HIGH (ref 150–400)
POTASSIUM SERPL-MCNC: 4.3 MMOL/L — SIGNIFICANT CHANGE UP (ref 3.5–5.3)
POTASSIUM SERPL-SCNC: 4.3 MMOL/L — SIGNIFICANT CHANGE UP (ref 3.5–5.3)
PROT SERPL-MCNC: 7 G/DL — SIGNIFICANT CHANGE UP (ref 6.6–8.7)
RBC # BLD: 2.91 M/UL — LOW (ref 4.4–5.2)
RBC # FLD: 14.4 % — SIGNIFICANT CHANGE UP (ref 11–15.6)
SODIUM SERPL-SCNC: 141 MMOL/L — SIGNIFICANT CHANGE UP (ref 135–145)
WBC # BLD: 10.5 K/UL — SIGNIFICANT CHANGE UP (ref 4.8–10.8)
WBC # FLD AUTO: 10.5 K/UL — SIGNIFICANT CHANGE UP (ref 4.8–10.8)

## 2018-05-09 PROCEDURE — 99232 SBSQ HOSP IP/OBS MODERATE 35: CPT

## 2018-05-09 PROCEDURE — 99233 SBSQ HOSP IP/OBS HIGH 50: CPT

## 2018-05-09 PROCEDURE — 99291 CRITICAL CARE FIRST HOUR: CPT

## 2018-05-09 RX ADMIN — Medication 10 MILLIGRAM(S): at 13:20

## 2018-05-09 RX ADMIN — CHLORHEXIDINE GLUCONATE 15 MILLILITER(S): 213 SOLUTION TOPICAL at 05:17

## 2018-05-09 RX ADMIN — Medication 100 MILLIGRAM(S): at 11:21

## 2018-05-09 RX ADMIN — Medication 500 MILLIGRAM(S): at 11:20

## 2018-05-09 RX ADMIN — Medication 200 MILLIGRAM(S): at 05:17

## 2018-05-09 RX ADMIN — CHLORHEXIDINE GLUCONATE 15 MILLILITER(S): 213 SOLUTION TOPICAL at 16:29

## 2018-05-09 RX ADMIN — Medication 500 MILLIGRAM(S): at 00:33

## 2018-05-09 RX ADMIN — Medication 1 APPLICATION(S): at 00:33

## 2018-05-09 RX ADMIN — PANTOPRAZOLE SODIUM 40 MILLIGRAM(S): 20 TABLET, DELAYED RELEASE ORAL at 11:20

## 2018-05-09 RX ADMIN — Medication 1 MILLIGRAM(S): at 11:21

## 2018-05-09 RX ADMIN — Medication 650 MILLIGRAM(S): at 15:12

## 2018-05-09 RX ADMIN — Medication 1 APPLICATION(S): at 16:29

## 2018-05-09 RX ADMIN — SENNA PLUS 2 TABLET(S): 8.6 TABLET ORAL at 21:20

## 2018-05-09 RX ADMIN — AMLODIPINE BESYLATE 10 MILLIGRAM(S): 2.5 TABLET ORAL at 05:17

## 2018-05-09 RX ADMIN — Medication 1 APPLICATION(S): at 05:17

## 2018-05-09 RX ADMIN — Medication 1 APPLICATION(S): at 11:21

## 2018-05-09 RX ADMIN — Medication 500 MILLIGRAM(S): at 05:17

## 2018-05-09 RX ADMIN — Medication 500 MILLIGRAM(S): at 16:29

## 2018-05-09 NOTE — PROGRESS NOTE ADULT - SUBJECTIVE AND OBJECTIVE BOX
The patient is a 63y old female with a history of alcoholism who complained of dizzyness and   collapsed at home on April 23rd 2018. She was taken to the [facility]  She was obtunded  and was intubated for airway support.  A CT scan of her head was done which showed a   pontine hemorrhage measuring 2.2 x 1.0 x 2.0 cms.  There was no mass effect.  She was   unresponsive and her pupils were small & nonreactive.  Neurosurgery was consulted and   declined surgical intervention.  She remains intubated and is sedated in the MICU on CPAP  with pressure support of 8 cms H2O, on an O2 of 30% with a sat of 100%.  She is scheduled to  have a FLEXIBLE BRONCHOSCOPY, TRACHEOSTOMY, ESOPHAGOGASTRODUODENOSCOPY,  PERCUTANEOUS ENDOSCOPIC GASTROSTOMY.  (23 Apr 2018 10:59)      PAST MEDICAL HISTORY:  Alcohol abuse      PAST SURGICAL HISTORY:  No significant past surgical history      MEDICATIONS  (STANDING):  amLODIPine   Tablet 10 milliGRAM(s) Oral daily  bisacodyl Suppository 10 milliGRAM(s) Rectal daily  cephalexin 500 milliGRAM(s) Oral four times a day  chlorhexidine 0.12% Liquid 15 milliLiter(s) Swish and Spit two times a day  folic acid 1 milliGRAM(s) Oral daily  labetalol 200 milliGRAM(s) Oral every 8 hours  pantoprazole   Suspension 40 milliGRAM(s) Oral before lunch  petrolatum Ophthalmic Ointment 1 Application(s) Both EYES every 6 hours  senna 2 Tablet(s) Oral at bedtime  thiamine 100 milliGRAM(s) Oral daily    MEDICATIONS  (PRN):  acetaminophen    Suspension 650 milliGRAM(s) Oral every 6 hours PRN For Temp greater than 38 C (100.4 F)  hydrALAZINE Injectable 20 milliGRAM(s) IV Push every 6 hours PRN systolic > 150  labetalol Injectable 20 milliGRAM(s) IV Push every 2 hours PRN systolic > 150  lactulose Syrup 20 Gram(s) Oral every 6 hours PRN Constpation  polyethylene glycol 3350 17 Gram(s) Oral daily PRN Constipation      Allergies:     No Known Allergies      SOCIAL HISTORY:                        9.0    10.5  )-----------( 468      ( 09 May 2018 05:29 )             28.9     PT/INR - ( 08 May 2018 11:18 )   PT: 11.1 sec;   INR: 1.01 ratio       PTT - ( 08 May 2018 11:18 )  PTT:29.6 sec    05-09    141  |  100  |  16.0  ----------------------------<  155<H>  4.3   |  31.0<H>  |  0.49<L>    Ca    9.3      09 May 2018 05:29  Phos  3.1     05-09  Mg     2.2     05-09    TPro  7.0  /  Alb  2.4<L>  /  TBili  <0.2<L>  /  DBili  x   /  AST  29  /  ALT  43<H>  /  AlkPhos  121<H>  05-09    EKG:  -  4/27/2018  Normal sinus rhythm  T wave abnormality, consider lateral ischemia    TT ECHO:  none    CHEST X-RAY  -  5/7/2018  Impression: No acute pulmonary disease. Intubated.      ASA # = 4   Mallampati # = Intubated

## 2018-05-09 NOTE — PROGRESS NOTE ADULT - SUBJECTIVE AND OBJECTIVE BOX
OVERNIGHT EVENTS:    BRIEF HOSPITAL COURSE:    Present Symptoms:     Dyspnea: 0 1 2 3   Nausea/Vomiting: Yes No  Anxiety:  Yes No  Depression: Yes No  Fatigue: Yes No  Loss of appetite: Yes No    Pain:             Character-            Duration-            Effect-            Factors-            Frequency-            Location-            Severity-    Review of Systems: Reviewed                     Negative:                     Positive:  Unable to obtain due to poor mentation   All others negative    MEDICATIONS  (STANDING):  amLODIPine   Tablet 10 milliGRAM(s) Oral daily  bisacodyl Suppository 10 milliGRAM(s) Rectal daily  cephalexin 500 milliGRAM(s) Oral four times a day  chlorhexidine 0.12% Liquid 15 milliLiter(s) Swish and Spit two times a day  folic acid 1 milliGRAM(s) Oral daily  labetalol 200 milliGRAM(s) Oral every 8 hours  pantoprazole   Suspension 40 milliGRAM(s) Oral before lunch  petrolatum Ophthalmic Ointment 1 Application(s) Both EYES every 6 hours  senna 2 Tablet(s) Oral at bedtime  thiamine 100 milliGRAM(s) Oral daily    MEDICATIONS  (PRN):  acetaminophen    Suspension 650 milliGRAM(s) Oral every 6 hours PRN For Temp greater than 38 C (100.4 F)  hydrALAZINE Injectable 20 milliGRAM(s) IV Push every 6 hours PRN systolic > 150  labetalol Injectable 20 milliGRAM(s) IV Push every 2 hours PRN systolic > 150  lactulose Syrup 20 Gram(s) Oral every 6 hours PRN Constpation  polyethylene glycol 3350 17 Gram(s) Oral daily PRN Constipation      PHYSICAL EXAM:    Vital Signs Last 24 Hrs  T(C): 37.8 (09 May 2018 12:00), Max: 37.9 (08 May 2018 22:00)  T(F): 100 (09 May 2018 12:00), Max: 100.2 (08 May 2018 22:00)  HR: 88 (09 May 2018 14:00) (70 - 101)  BP: 114/64 (09 May 2018 14:00) (103/59 - 133/62)  BP(mean): 84 (09 May 2018 14:00) (76 - 106)  RR: 27 (09 May 2018 14:00) (16 - 37)  SpO2: 98% (09 May 2018 14:00) (97% - 100%)    General: alert  oriented x ____ lethargic agitated                  cachexia  nonverbal  coma    Karnofsky:  %    HEENT: normal  dry mouth  ET tube/trach    Lungs: comfortable tachypnea/labored breathing  excessive secretions    CV: normal  tachycardia    GI: normal  distended  tender  no BS               PEG/NG/OG tube  constipation  last BM:     : normal  incontinent  oliguria/anuria  bush    MSK: normal  weakness  edema             ambulatory  bedbound/wheelchair bound    Skin: normal  pressure ulcers- Stage_____  no rash    LABS:                          9.0    10.5  )-----------( 468      ( 09 May 2018 05:29 )             28.9     05-09    141  |  100  |  16.0  ----------------------------<  155<H>  4.3   |  31.0<H>  |  0.49<L>    Ca    9.3      09 May 2018 05:29  Phos  3.1     05-09  Mg     2.2     05-09    TPro  7.0  /  Alb  2.4<L>  /  TBili  <0.2<L>  /  DBili  x   /  AST  29  /  ALT  43<H>  /  AlkPhos  121<H>  05-09    PT/INR - ( 08 May 2018 11:18 )   PT: 11.1 sec;   INR: 1.01 ratio         PTT - ( 08 May 2018 11:18 )  PTT:29.6 sec    I&O's Summary    08 May 2018 07:01  -  09 May 2018 07:00  --------------------------------------------------------  IN: 2240 mL / OUT: 1610 mL / NET: 630 mL    09 May 2018 07:01  -  09 May 2018 14:55  --------------------------------------------------------  IN: 620 mL / OUT: 320 mL / NET: 300 mL        RADIOLOGY & ADDITIONAL STUDIES:    ADVANCE DIRECTIVES:   DNR YES NO  Completed on:                     MOLST  YES NO   Completed on:  Living Will  YES NO   Completed on: OVERNIGHT EVENTS: patient being seen for respiratory failure, pontine hemorrhage     Present Symptoms:     Dyspnea: vented   Nausea/Vomiting: No  Anxiety:  unable  Depression: unable  Fatigue: unable  Loss of appetite: unable    Pain: none             Character-            Duration-            Effect-            Factors-            Frequency-            Location-            Severity-    Review of Systems: Reviewed  Unable to obtain due to poor mentation   All others negative    MEDICATIONS  (STANDING):  amLODIPine   Tablet 10 milliGRAM(s) Oral daily  bisacodyl Suppository 10 milliGRAM(s) Rectal daily  cephalexin 500 milliGRAM(s) Oral four times a day  chlorhexidine 0.12% Liquid 15 milliLiter(s) Swish and Spit two times a day  folic acid 1 milliGRAM(s) Oral daily  labetalol 200 milliGRAM(s) Oral every 8 hours  pantoprazole   Suspension 40 milliGRAM(s) Oral before lunch  petrolatum Ophthalmic Ointment 1 Application(s) Both EYES every 6 hours  senna 2 Tablet(s) Oral at bedtime  thiamine 100 milliGRAM(s) Oral daily    MEDICATIONS  (PRN):  acetaminophen    Suspension 650 milliGRAM(s) Oral every 6 hours PRN For Temp greater than 38 C (100.4 F)  hydrALAZINE Injectable 20 milliGRAM(s) IV Push every 6 hours PRN systolic > 150  labetalol Injectable 20 milliGRAM(s) IV Push every 2 hours PRN systolic > 150  lactulose Syrup 20 Gram(s) Oral every 6 hours PRN Constpation  polyethylene glycol 3350 17 Gram(s) Oral daily PRN Constipation    PHYSICAL EXAM:    Vital Signs Last 24 Hrs  T(C): 37.8 (09 May 2018 12:00), Max: 37.9 (08 May 2018 22:00)  T(F): 100 (09 May 2018 12:00), Max: 100.2 (08 May 2018 22:00)  HR: 88 (09 May 2018 14:00) (70 - 101)  BP: 114/64 (09 May 2018 14:00) (103/59 - 133/62)  BP(mean): 84 (09 May 2018 14:00) (76 - 106)  RR: 27 (09 May 2018 14:00) (16 - 37)  SpO2: 98% (09 May 2018 14:00) (97% - 100%)    al: lethargic     Karnofsky:  20%    HEENT: ET tube    Lungs: comfortable     CV: normal      GI: normal     : eleazar    MSK: weakness; anasarca     Skin: no rash    LABS:               9.0    10.5  )-----------( 468      ( 09 May 2018 05:29 )             28.9     05-09    141  |  100  |  16.0  ----------------------------<  155<H>  4.3   |  31.0<H>  |  0.49<L>    Ca    9.3      09 May 2018 05:29  Phos  3.1     05-09  Mg     2.2     05-09    TPro  7.0  /  Alb  2.4<L>  /  TBili  <0.2<L>  /  DBili  x   /  AST  29  /  ALT  43<H>  /  AlkPhos  121<H>  05-09    PT/INR - ( 08 May 2018 11:18 )   PT: 11.1 sec;   INR: 1.01 ratio      PTT - ( 08 May 2018 11:18 )  PTT:29.6 sec    I&O's Summary    08 May 2018 07:01  -  09 May 2018 07:00  --------------------------------------------------------  IN: 2240 mL / OUT: 1610 mL / NET: 630 mL    09 May 2018 07:01  -  09 May 2018 14:55  --------------------------------------------------------  IN: 620 mL / OUT: 320 mL / NET: 300 mL    RADIOLOGY & ADDITIONAL STUDIES:    ADVANCE DIRECTIVES: Full code

## 2018-05-09 NOTE — PROGRESS NOTE ADULT - ASSESSMENT
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     1) ICH ( intracranial hemorrhage )  Avoid antiplatelet.   Continue blood pressure control.   Supportive care/vent management.  Overall prognosis remains poor.   Family has elected for tracheostomy/PEG. These are scheduled for 5/10/18    2) Altered mental status  Secondary to intracranial hemorrhage.     3) Hypertension  Continue blood pressure control.     Case discussed with Dr. Wang    will follow with you    Anirudh Abarca MD PhD   [id number]

## 2018-05-09 NOTE — PROGRESS NOTE ADULT - SUBJECTIVE AND OBJECTIVE BOX
Patient is a 63y old  Female who presents with a chief complaint of complained of dizzyness and collapsed at home (23 Apr 2018 10:59)      BRIEF HOSPITAL COURSE:     Events last 24 hours: ***    PAST MEDICAL & SURGICAL HISTORY:  Alcohol abuse  No significant past surgical history      Review of Systems:  CONSTITUTIONAL: No fever, chills, or fatigue  EYES: No eye pain, visual disturbances, or discharge  ENMT:  No difficulty hearing, tinnitus, vertigo; No sinus or throat pain  NECK: No pain or stiffness  RESPIRATORY: No cough, wheezing, chills or hemoptysis; No shortness of breath  CARDIOVASCULAR: No chest pain, palpitations, dizziness, or leg swelling  GASTROINTESTINAL: No abdominal or epigastric pain. No nausea, vomiting, or hematemesis; No diarrhea or constipation. No melena or hematochezia.  GENITOURINARY: No dysuria, frequency, hematuria, or incontinence  NEUROLOGICAL: No headaches, memory loss, loss of strength, numbness, or tremors  SKIN: No itching, burning, rashes, or lesions   MUSCULOSKELETAL: No joint pain or swelling; No muscle, back, or extremity pain  PSYCHIATRIC: No depression, anxiety, mood swings, or difficulty sleeping      Medications:  cephalexin 500 milliGRAM(s) Oral four times a day    amLODIPine   Tablet 10 milliGRAM(s) Oral daily  hydrALAZINE Injectable 20 milliGRAM(s) IV Push every 6 hours PRN  labetalol 200 milliGRAM(s) Oral every 8 hours  labetalol Injectable 20 milliGRAM(s) IV Push every 2 hours PRN      acetaminophen    Suspension 650 milliGRAM(s) Oral every 6 hours PRN        bisacodyl Suppository 10 milliGRAM(s) Rectal daily  lactulose Syrup 20 Gram(s) Oral every 6 hours PRN  pantoprazole   Suspension 40 milliGRAM(s) Oral before lunch  polyethylene glycol 3350 17 Gram(s) Oral daily PRN  senna 2 Tablet(s) Oral at bedtime        folic acid 1 milliGRAM(s) Oral daily  thiamine 100 milliGRAM(s) Oral daily      chlorhexidine 0.12% Liquid 15 milliLiter(s) Swish and Spit two times a day  petrolatum Ophthalmic Ointment 1 Application(s) Both EYES every 6 hours        Mode: CPAP with PS  FiO2: 30  PEEP: 5  PS: 8  MAP: 8      ICU Vital Signs Last 24 Hrs  T(C): 37.6 (09 May 2018 09:00), Max: 38 (08 May 2018 12:00)  T(F): 99.7 (09 May 2018 09:00), Max: 100.4 (08 May 2018 12:00)  HR: 84 (09 May 2018 10:00) (70 - 101)  BP: 118/68 (09 May 2018 10:00) (101/55 - 133/62)  BP(mean): 88 (09 May 2018 10:00) (75 - 104)  ABP: --  ABP(mean): --  RR: 36 (09 May 2018 10:00) (16 - 37)  SpO2: 100% (09 May 2018 10:00) (97% - 100%)          I&O's Detail    08 May 2018 07:01  -  09 May 2018 07:00  --------------------------------------------------------  IN:    Enteral Tube Flush: 800 mL    Jevity: 1440 mL  Total IN: 2240 mL    OUT:    Indwelling Catheter - Urethral: 1610 mL  Total OUT: 1610 mL    Total NET: 630 mL      09 May 2018 07:01  -  09 May 2018 10:52  --------------------------------------------------------  IN:  Total IN: 0 mL    OUT:    Indwelling Catheter - Urethral: 250 mL  Total OUT: 250 mL    Total NET: -250 mL            LABS:                        9.0    10.5  )-----------( 468      ( 09 May 2018 05:29 )             28.9     05-09    141  |  100  |  16.0  ----------------------------<  155<H>  4.3   |  31.0<H>  |  0.49<L>    Ca    9.3      09 May 2018 05:29  Phos  3.1     05-09  Mg     2.2     05-09    TPro  7.0  /  Alb  2.4<L>  /  TBili  <0.2<L>  /  DBili  x   /  AST  29  /  ALT  43<H>  /  AlkPhos  121<H>  05-09          CAPILLARY BLOOD GLUCOSE        PT/INR - ( 08 May 2018 11:18 )   PT: 11.1 sec;   INR: 1.01 ratio         PTT - ( 08 May 2018 11:18 )  PTT:29.6 sec    CULTURES:  Culture Results:   Numerous Staphylococcus aureus  No Routine respiratory ruddy present (05-04-18 @ 04:32)      Physical Examination:    General: No acute distress.  Alert, oriented, interactive, nonfocal    HEENT: Pupils equal, reactive to light.  Symmetric.    PULM: Clear to auscultation bilaterally, no significant sputum production    CVS: Regular rate and rhythm, no murmurs, rubs, or gallops    ABD: Soft, nondistended, nontender, normoactive bowel sounds, no masses    EXT: No edema, nontender    SKIN: Warm and well perfused, no rashes noted.    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 Cardene weaned off. Remains on vent with poor neuro exam. 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 Cardene weaned off. Remains on vent with poor neuro exam.       Events last 24 hours: no acute issues    PAST MEDICAL & SURGICAL HISTORY:  Alcohol abuse  No significant past surgical history      Review of Systems:  unable to do secondary to stroke      Medications:  cephalexin 500 milliGRAM(s) Oral four times a day    amLODIPine   Tablet 10 milliGRAM(s) Oral daily  hydrALAZINE Injectable 20 milliGRAM(s) IV Push every 6 hours PRN  labetalol 200 milliGRAM(s) Oral every 8 hours  labetalol Injectable 20 milliGRAM(s) IV Push every 2 hours PRN    acetaminophen    Suspension 650 milliGRAM(s) Oral every 6 hours PRN  bisacodyl Suppository 10 milliGRAM(s) Rectal daily  lactulose Syrup 20 Gram(s) Oral every 6 hours PRN  pantoprazole   Suspension 40 milliGRAM(s) Oral before lunch  polyethylene glycol 3350 17 Gram(s) Oral daily PRN  senna 2 Tablet(s) Oral at bedtime        folic acid 1 milliGRAM(s) Oral daily  thiamine 100 milliGRAM(s) Oral daily      chlorhexidine 0.12% Liquid 15 milliLiter(s) Swish and Spit two times a day  petrolatum Ophthalmic Ointment 1 Application(s) Both EYES every 6 hours        Mode: CPAP with PS  FiO2: 30  PEEP: 5  PS: 8  MAP: 8      ICU Vital Signs Last 24 Hrs  T(C): 37.6 (09 May 2018 09:00), Max: 38 (08 May 2018 12:00)  T(F): 99.7 (09 May 2018 09:00), Max: 100.4 (08 May 2018 12:00)  HR: 84 (09 May 2018 10:00) (70 - 101)  BP: 118/68 (09 May 2018 10:00) (101/55 - 133/62)  BP(mean): 88 (09 May 2018 10:00) (75 - 104)  ABP: --  ABP(mean): --  RR: 36 (09 May 2018 10:00) (16 - 37)  SpO2: 100% (09 May 2018 10:00) (97% - 100%)          I&O's Detail    08 May 2018 07:01  -  09 May 2018 07:00  --------------------------------------------------------  IN:    Enteral Tube Flush: 800 mL    Jevity: 1440 mL  Total IN: 2240 mL    OUT:    Indwelling Catheter - Urethral: 1610 mL  Total OUT: 1610 mL    Total NET: 630 mL      09 May 2018 07:01  -  09 May 2018 10:52  --------------------------------------------------------  IN:  Total IN: 0 mL    OUT:    Indwelling Catheter - Urethral: 250 mL  Total OUT: 250 mL    Total NET: -250 mL            LABS:                        9.0    10.5  )-----------( 468      ( 09 May 2018 05:29 )             28.9     05-09    141  |  100  |  16.0  ----------------------------<  155<H>  4.3   |  31.0<H>  |  0.49<L>    Ca    9.3      09 May 2018 05:29  Phos  3.1     05-09  Mg     2.2     05-09    TPro  7.0  /  Alb  2.4<L>  /  TBili  <0.2<L>  /  DBili  x   /  AST  29  /  ALT  43<H>  /  AlkPhos  121<H>  05-09          CAPILLARY BLOOD GLUCOSE        PT/INR - ( 08 May 2018 11:18 )   PT: 11.1 sec;   INR: 1.01 ratio         PTT - ( 08 May 2018 11:18 )  PTT:29.6 sec    CULTURES:  Culture Results:   Numerous Staphylococcus aureus  No Routine respiratory ruddy present (05-04-18 @ 04:32)      Physical Examination:    General: No acute distress.      HEENT: Pupils equal, reactive to light.  Symmetric.    PULM: diminished + secretions    CVS: Regular rate and rhythm, no murmurs, rubs, or gallops    ABD: Soft, nondistended, nontender, normoactive bowel sounds, no masses    EXT: No edema, nontender    SKIN: Warm and well perfused, no rashes noted.    CRITICAL CARE TIME SPENT: 35 min

## 2018-05-09 NOTE — PROGRESS NOTE ADULT - ATTENDING COMMENTS
Thank you for the opportunity to assist with the care of this patient.   [facility] [phone].

## 2018-05-09 NOTE — PROGRESS NOTE ADULT - SUBJECTIVE AND OBJECTIVE BOX
[facility]                                        [facility]                                 Rima Whiteside, & Saad                                  [address]                                        [place], [place], [address]                                             [phone]          CC: Pontine intracranial hemorrhage.  HPI:  63y Female who c/o dizziness and then collapsed at home.  She had poor mental status and was intubated in the ED. There was no associated seizure activity.   She was found to have a large brainstem intracranial hemorrhage on CT.   She has remained on mechanical ventilator since admission.     Interim history:  She has remained on mechanical ventilator.  She follows simple commands intermittently.  Family has decided for trach/PEG    ROS:   Unobtainable due to patient's condition.     MEDICATIONS  (STANDING):  amLODIPine   Tablet 10 milliGRAM(s) Oral daily  bisacodyl Suppository 10 milliGRAM(s) Rectal daily  cephalexin 500 milliGRAM(s) Oral four times a day  chlorhexidine 0.12% Liquid 15 milliLiter(s) Swish and Spit two times a day  folic acid 1 milliGRAM(s) Oral daily  labetalol 200 milliGRAM(s) Oral every 8 hours  pantoprazole   Suspension 40 milliGRAM(s) Oral before lunch  petrolatum Ophthalmic Ointment 1 Application(s) Both EYES every 6 hours  senna 2 Tablet(s) Oral at bedtime  thiamine 100 milliGRAM(s) Oral daily    MEDICATIONS  (PRN):  acetaminophen    Suspension 650 milliGRAM(s) Oral every 6 hours PRN For Temp greater than 38 C (100.4 F)  hydrALAZINE Injectable 20 milliGRAM(s) IV Push every 6 hours PRN systolic > 150  labetalol Injectable 20 milliGRAM(s) IV Push every 2 hours PRN systolic > 150  lactulose Syrup 20 Gram(s) Oral every 6 hours PRN Constpation  polyethylene glycol 3350 17 Gram(s) Oral daily PRN Constipation      Vital Signs Last 24 Hrs  T(C): 37.6 (09 May 2018 16:00), Max: 38 (09 May 2018 15:00)  T(F): 99.6 (09 May 2018 16:00), Max: 100.4 (09 May 2018 15:00)  HR: 86 (09 May 2018 16:00) (70 - 101)  BP: 125/70 (09 May 2018 16:00) (103/59 - 135/73)  BP(mean): 91 (09 May 2018 16:00) (76 - 106)  RR: 30 (09 May 2018 16:00) (16 - 37)  SpO2: 100% (09 May 2018 16:00) (97% - 100%)    Detailed Neurologic Exam:    Mental status: Eyes open,   vertical eye movements, follows simple commands inconsistently.  Nods to questions.    Cranial nerves: Pupils pinpoint. There is slight blink to threat. There is roving eye movement in the vertical plane. Palate and tongue cannot be assessed.     Motor/Sensory:  There is normal bulk and tone.  There is no tremor.  There is minimal extensor posturing bilaterally to stimuli.  minimal purposeful movement toes wiggle to request.    Reflexes: Trace throughout and plantar responses are extensor bilaterally.    Cerebellar: Cannot be tested.    Labs:                           9.0    10.5  )-----------( 468      ( 09 May 2018 05:29 )             28.9   05-09    141  |  100  |  16.0  ----------------------------<  155<H>  4.3   |  31.0<H>  |  0.49<L>    Ca    9.3      09 May 2018 05:29  Phos  3.1     05-09  Mg     2.2     05-09    TPro  7.0  /  Alb  2.4<L>  /  TBili  <0.2<L>  /  DBili  x   /  AST  29  /  ALT  43<H>  /  AlkPhos  121<H>  05-09

## 2018-05-09 NOTE — PROGRESS NOTE ADULT - PROBLEM SELECTOR PLAN 4
- -spoke to  Capo today. He really is not sure what her wishes would be since she never distinctly expressed her wishes, but deep down he is not sure she would want the trach. He is moving forward with the trach but we discussed a time limited trial with the tracheostomy.  He feels that if she does not improve with the trach for a certain time period that he would shift focus to making her comfortable because he does not want to see her suffer.

## 2018-05-09 NOTE — CHART NOTE - NSCHARTNOTEFT_GEN_A_CORE
Source: Patient [ ]  Family [ ]   other [x ]  EMR  (Pt remains on vent support)     Current Diet:  NPO     Patient reports [ ] nausea  [ ] vomiting [ ] diarrhea [ ] constipation  [ ]chewing problems [ ] swallowing issues  [ ] other:     PO intake:  < 50% [ ]   50-75%  [ ]   %  [ ]  other :    Source for PO intake [ ] Patient [ ] family [ ] chart [ ] staff [ ] other    Enteral /Parenteral Nutrition: Diet, NPO with Tube Feed:   Tube Feeding Modality: Orogastric  Jevity 1.5 Bhupendra  Total Volume for 24 Hours (mL): 1440  Continuous  Starting Tube Feed Rate {mL per Hour}: 60  Until Goal Tube Feed Rate (mL per Hour): 60  Tube Feed Duration (in Hours): 24  Tube Feed Start Time: 11:00 (04-24-18 @ 10:49)  Diet, NPO after Midnight:      NPO Start Date: 09-May-2018,   NPO Start Time: 23:59 (05-09-18 @ 10:49)      Current Weight:   5/9: 74 kg 5/2: 74.6kg   4/26: 69kg   (unsure of accuracy, will monitor trends)       % Weight Change  No weight changes over past week     Pertinent Medications: MEDICATIONS  (STANDING):  amLODIPine   Tablet 10 milliGRAM(s) Oral daily  bisacodyl Suppository 10 milliGRAM(s) Rectal daily  cephalexin 500 milliGRAM(s) Oral four times a day  chlorhexidine 0.12% Liquid 15 milliLiter(s) Swish and Spit two times a day  folic acid 1 milliGRAM(s) Oral daily  labetalol 200 milliGRAM(s) Oral every 8 hours  pantoprazole   Suspension 40 milliGRAM(s) Oral before lunch  petrolatum Ophthalmic Ointment 1 Application(s) Both EYES every 6 hours  senna 2 Tablet(s) Oral at bedtime  thiamine 100 milliGRAM(s) Oral daily    MEDICATIONS  (PRN):  acetaminophen    Suspension 650 milliGRAM(s) Oral every 6 hours PRN For Temp greater than 38 C (100.4 F)  hydrALAZINE Injectable 20 milliGRAM(s) IV Push every 6 hours PRN systolic > 150  labetalol Injectable 20 milliGRAM(s) IV Push every 2 hours PRN systolic > 150  lactulose Syrup 20 Gram(s) Oral every 6 hours PRN Constpation  polyethylene glycol 3350 17 Gram(s) Oral daily PRN Constipation    Pertinent Labs: CBC Full  -  ( 09 May 2018 05:29 )  WBC Count : 10.5 K/uL  Hemoglobin : 9.0 g/dL  Hematocrit : 28.9 %  Platelet Count - Automated : 468 K/uL  Mean Cell Volume : 99.3 fl  Mean Cell Hemoglobin : 30.9 pg  Mean Cell Hemoglobin Concentration : 31.1 g/dL  Auto Neutrophil # : x  Auto Lymphocyte # : x  Auto Monocyte # : x  Auto Eosinophil # : x  Auto Basophil # : x  Auto Neutrophil % : x  Auto Lymphocyte % : x  Auto Monocyte % : x  Auto Eosinophil % : x  Auto Basophil % : x        Skin:  no breakdown noted     Nutrition focused physical exam conducted - found signs of malnutrition [x ]absent [ ]present    Subcutaneous fat loss: [ ] Orbital fat pads region, [ ]Buccal fat region, [ ]Triceps region,  [ ]Ribs region    Muscle wasting: [ ]Temples region, [ ]Clavicle region, [ ]Shoulder region, [ ]Scapula region, [ ]Interosseous region,  [ ]thigh region, [ ]Calf region    Estimated Needs:   [x ] no change since previous assessment  [ ] recalculated:     Current Nutrition Diagnosis:   Pt remains at high nutrition risk secondary to difficulty swallowing related to recent pontine hemorrhagic stroke on vent support as evidenced by current need for nutrition support. Pt currently receiving enteral feeds of Jevity @ 60ml/hr (x20 hours) 1200ml/day 1800kcal, 77gm protein. Meeting estimated nutrition needs at this time.  Aware probable Trach/PEG pending. Glucose levels remain elevated, See recommendations below:      Recommendations:   1. Rx: MVI, thiamine and folic acid daily   2. Consider changing enteral formula to Glucerna 1.5@ 60ml/hr (for better glucose control)   3. Check wt daily to monitor trends   4. monitor glucose levels, H/H   5. Continue with free water 200ml every 6 hours     Monitoring and Evaluation:   [x ] PO intake [x ] Tolerance to diet prescription [X] Weights  [X] Follow up per protocol [X] Labs:

## 2018-05-10 ENCOUNTER — APPOINTMENT (OUTPATIENT)
Dept: THORACIC SURGERY | Facility: HOSPITAL | Age: 63
End: 2018-05-10
Payer: MEDICARE

## 2018-05-10 LAB
ABO RH CONFIRMATION: SIGNIFICANT CHANGE UP
ALBUMIN SERPL ELPH-MCNC: 2.7 G/DL — LOW (ref 3.3–5.2)
ALP SERPL-CCNC: 116 U/L — SIGNIFICANT CHANGE UP (ref 40–120)
ALT FLD-CCNC: 41 U/L — HIGH
ANION GAP SERPL CALC-SCNC: 9 MMOL/L — SIGNIFICANT CHANGE UP (ref 5–17)
APTT BLD: 28 SEC — SIGNIFICANT CHANGE UP (ref 27.5–37.4)
AST SERPL-CCNC: 28 U/L — SIGNIFICANT CHANGE UP
BILIRUB SERPL-MCNC: <0.2 MG/DL — LOW (ref 0.4–2)
BLD GP AB SCN SERPL QL: SIGNIFICANT CHANGE UP
BUN SERPL-MCNC: 17 MG/DL — SIGNIFICANT CHANGE UP (ref 8–20)
CALCIUM SERPL-MCNC: 9.6 MG/DL — SIGNIFICANT CHANGE UP (ref 8.6–10.2)
CHLORIDE SERPL-SCNC: 100 MMOL/L — SIGNIFICANT CHANGE UP (ref 98–107)
CO2 SERPL-SCNC: 33 MMOL/L — HIGH (ref 22–29)
CREAT SERPL-MCNC: 0.53 MG/DL — SIGNIFICANT CHANGE UP (ref 0.5–1.3)
GLUCOSE SERPL-MCNC: 112 MG/DL — SIGNIFICANT CHANGE UP (ref 70–115)
HCT VFR BLD CALC: 29.2 % — LOW (ref 37–47)
HGB BLD-MCNC: 9 G/DL — LOW (ref 12–16)
INR BLD: 1.04 RATIO — SIGNIFICANT CHANGE UP (ref 0.88–1.16)
MAGNESIUM SERPL-MCNC: 2.2 MG/DL — SIGNIFICANT CHANGE UP (ref 1.6–2.6)
MCHC RBC-ENTMCNC: 30.7 PG — SIGNIFICANT CHANGE UP (ref 27–31)
MCHC RBC-ENTMCNC: 30.8 G/DL — LOW (ref 32–36)
MCV RBC AUTO: 99.7 FL — HIGH (ref 81–99)
PHOSPHATE SERPL-MCNC: 3.2 MG/DL — SIGNIFICANT CHANGE UP (ref 2.4–4.7)
PLATELET # BLD AUTO: 511 K/UL — HIGH (ref 150–400)
POTASSIUM SERPL-MCNC: 4.3 MMOL/L — SIGNIFICANT CHANGE UP (ref 3.5–5.3)
POTASSIUM SERPL-SCNC: 4.3 MMOL/L — SIGNIFICANT CHANGE UP (ref 3.5–5.3)
PROT SERPL-MCNC: 7.3 G/DL — SIGNIFICANT CHANGE UP (ref 6.6–8.7)
PROTHROM AB SERPL-ACNC: 11.4 SEC — SIGNIFICANT CHANGE UP (ref 9.8–12.7)
RBC # BLD: 2.93 M/UL — LOW (ref 4.4–5.2)
RBC # FLD: 14.4 % — SIGNIFICANT CHANGE UP (ref 11–15.6)
SODIUM SERPL-SCNC: 142 MMOL/L — SIGNIFICANT CHANGE UP (ref 135–145)
TYPE + AB SCN PNL BLD: SIGNIFICANT CHANGE UP
WBC # BLD: 12.4 K/UL — HIGH (ref 4.8–10.8)
WBC # FLD AUTO: 12.4 K/UL — HIGH (ref 4.8–10.8)

## 2018-05-10 PROCEDURE — 31624 DX BRONCHOSCOPE/LAVAGE: CPT

## 2018-05-10 PROCEDURE — 43246 EGD PLACE GASTROSTOMY TUBE: CPT

## 2018-05-10 PROCEDURE — 99233 SBSQ HOSP IP/OBS HIGH 50: CPT

## 2018-05-10 PROCEDURE — 99232 SBSQ HOSP IP/OBS MODERATE 35: CPT

## 2018-05-10 PROCEDURE — 43246 EGD PLACE GASTROSTOMY TUBE: CPT | Mod: AS

## 2018-05-10 PROCEDURE — 31600 PLANNED TRACHEOSTOMY: CPT

## 2018-05-10 PROCEDURE — 99291 CRITICAL CARE FIRST HOUR: CPT

## 2018-05-10 RX ORDER — SODIUM CHLORIDE 9 MG/ML
1000 INJECTION, SOLUTION INTRAVENOUS
Qty: 0 | Refills: 0 | Status: DISCONTINUED | OUTPATIENT
Start: 2018-05-10 | End: 2018-05-11

## 2018-05-10 RX ORDER — ENOXAPARIN SODIUM 100 MG/ML
40 INJECTION SUBCUTANEOUS DAILY
Qty: 0 | Refills: 0 | Status: DISCONTINUED | OUTPATIENT
Start: 2018-05-10 | End: 2018-05-25

## 2018-05-10 RX ADMIN — Medication 650 MILLIGRAM(S): at 05:29

## 2018-05-10 RX ADMIN — SODIUM CHLORIDE 125 MILLILITER(S): 9 INJECTION, SOLUTION INTRAVENOUS at 23:27

## 2018-05-10 RX ADMIN — SENNA PLUS 2 TABLET(S): 8.6 TABLET ORAL at 21:40

## 2018-05-10 RX ADMIN — SODIUM CHLORIDE 125 MILLILITER(S): 9 INJECTION, SOLUTION INTRAVENOUS at 17:21

## 2018-05-10 RX ADMIN — Medication 500 MILLIGRAM(S): at 23:27

## 2018-05-10 RX ADMIN — Medication 1 APPLICATION(S): at 17:21

## 2018-05-10 RX ADMIN — CHLORHEXIDINE GLUCONATE 15 MILLILITER(S): 213 SOLUTION TOPICAL at 05:29

## 2018-05-10 RX ADMIN — Medication 500 MILLIGRAM(S): at 00:10

## 2018-05-10 RX ADMIN — AMLODIPINE BESYLATE 10 MILLIGRAM(S): 2.5 TABLET ORAL at 05:30

## 2018-05-10 RX ADMIN — Medication 200 MILLIGRAM(S): at 05:29

## 2018-05-10 RX ADMIN — Medication 650 MILLIGRAM(S): at 19:16

## 2018-05-10 RX ADMIN — Medication 200 MILLIGRAM(S): at 21:40

## 2018-05-10 RX ADMIN — Medication 1 APPLICATION(S): at 23:27

## 2018-05-10 RX ADMIN — Medication 1 APPLICATION(S): at 00:10

## 2018-05-10 RX ADMIN — Medication 500 MILLIGRAM(S): at 17:21

## 2018-05-10 RX ADMIN — Medication 1 APPLICATION(S): at 05:30

## 2018-05-10 RX ADMIN — Medication 500 MILLIGRAM(S): at 05:29

## 2018-05-10 RX ADMIN — Medication 1 APPLICATION(S): at 12:10

## 2018-05-10 RX ADMIN — CHLORHEXIDINE GLUCONATE 15 MILLILITER(S): 213 SOLUTION TOPICAL at 17:20

## 2018-05-10 NOTE — PROGRESS NOTE ADULT - PROBLEM SELECTOR PLAN 1
- now s/p trach, trach care, suctioning, weaning, unlikely that she will be able to wean off the ventilator

## 2018-05-10 NOTE — PROGRESS NOTE ADULT - SUBJECTIVE AND OBJECTIVE BOX
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 Cardene weaned off. Remains on vent with poor neuro exam. 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 Cardene weaned off. Remains on vent with poor neuro exam.    Events last 24 hours: trach and peg today    PAST MEDICAL & SURGICAL HISTORY:  Alcohol abuse  No significant past surgical history      Review of Systems:  limited due to stroke      Medications:  cephalexin 500 milliGRAM(s) Oral four times a day    amLODIPine   Tablet 10 milliGRAM(s) Oral daily  hydrALAZINE Injectable 20 milliGRAM(s) IV Push every 6 hours PRN  labetalol 200 milliGRAM(s) Oral every 8 hours  labetalol Injectable 20 milliGRAM(s) IV Push every 2 hours PRN      acetaminophen    Suspension 650 milliGRAM(s) Oral every 6 hours PRN        bisacodyl Suppository 10 milliGRAM(s) Rectal daily  lactulose Syrup 20 Gram(s) Oral every 6 hours PRN  pantoprazole   Suspension 40 milliGRAM(s) Oral before lunch  polyethylene glycol 3350 17 Gram(s) Oral daily PRN  senna 2 Tablet(s) Oral at bedtime        folic acid 1 milliGRAM(s) Oral daily  multiple electrolytes Injection Type 1 1000 milliLiter(s) IV Continuous <Continuous>  thiamine 100 milliGRAM(s) Oral daily      chlorhexidine 0.12% Liquid 15 milliLiter(s) Swish and Spit two times a day  petrolatum Ophthalmic Ointment 1 Application(s) Both EYES every 6 hours        Mode: AC/ CMV (Assist Control/ Continuous Mandatory Ventilation)  RR (machine): 12  TV (machine): 500  FiO2: 100  PEEP: 8  MAP: 13  PIP: 26      ICU Vital Signs Last 24 Hrs  T(C): 37.2 (10 May 2018 16:00), Max: 38 (10 May 2018 04:00)  T(F): 99 (10 May 2018 16:00), Max: 100.4 (10 May 2018 04:00)  HR: 75 (10 May 2018 18:00) (72 - 97)  BP: 123/69 (10 May 2018 18:00) (100/59 - 157/85)  BP(mean): 90 (10 May 2018 18:00) (73 - 114)  ABP: --  ABP(mean): --  RR: 15 (10 May 2018 18:00) (12 - 42)  SpO2: 100% (10 May 2018 18:00) (95% - 100%)          I&O's Detail    09 May 2018 07:01  -  10 May 2018 07:00  --------------------------------------------------------  IN:    Enteral Tube Flush: 400 mL    Jevity: 720 mL  Total IN: 1120 mL    OUT:    Indwelling Catheter - Urethral: 1215 mL  Total OUT: 1215 mL    Total NET: -95 mL      10 May 2018 07:01  -  10 May 2018 18:33  --------------------------------------------------------  IN:    multiple electrolytes Injection Type 1: 375 mL  Total IN: 375 mL    OUT:    Indwelling Catheter - Urethral: 640 mL  Total OUT: 640 mL    Total NET: -265 mL            LABS:                        9.0    12.4  )-----------( 511      ( 10 May 2018 04:00 )             29.2     05-10    142  |  100  |  17.0  ----------------------------<  112  4.3   |  33.0<H>  |  0.53    Ca    9.6      10 May 2018 04:00  Phos  3.2     05-10  Mg     2.2     05-10    TPro  7.3  /  Alb  2.7<L>  /  TBili  <0.2<L>  /  DBili  x   /  AST  28  /  ALT  41<H>  /  AlkPhos  116  05-10          CAPILLARY BLOOD GLUCOSE        PT/INR - ( 10 May 2018 04:00 )   PT: 11.4 sec;   INR: 1.04 ratio         PTT - ( 10 May 2018 04:00 )  PTT:28.0 sec    CULTURES:  Culture Results:   Numerous Staphylococcus aureus  No Routine respiratory ruddy present (05-04-18 @ 04:32)      Physical Examination:    General: No acute distress.     HEENT: Pupils equal, reactive to light.  Symmetric.    PULM: Clear to auscultation bilaterally, no significant sputum production    CVS: Regular rate and rhythm, no murmurs, rubs, or gallops    ABD: Soft, nondistended, nontender, normoactive bowel sounds, no masses    EXT: No edema, nontender    SKIN: Warm and well perfused, no rashes noted.    NEURO: overbreathes ventilator, inconsistant in following commands, withdraws to pain        CRITICAL CARE TIME SPENT: 35 min

## 2018-05-10 NOTE — PROGRESS NOTE ADULT - SUBJECTIVE AND OBJECTIVE BOX
CC: patient being seen for respiratory failure. and pontine hemorrhage     BRIEF HOSPITAL COURSE:    Present Symptoms:     Dyspnea: vented  Nausea/Vomiting: No  Anxiety:  unable  Depression: unable  Fatigue: unable   Loss of appetite: unable     Pain: none             Character-            Duration-            Effect-            Factors-            Frequency-            Location-            Severity-    Review of Systems: Reviewed  Unable to obtain due to poor mentation   All others negative    MEDICATIONS  (STANDING):  amLODIPine   Tablet 10 milliGRAM(s) Oral daily  bisacodyl Suppository 10 milliGRAM(s) Rectal daily  cephalexin 500 milliGRAM(s) Oral four times a day  chlorhexidine 0.12% Liquid 15 milliLiter(s) Swish and Spit two times a day  folic acid 1 milliGRAM(s) Oral daily  labetalol 200 milliGRAM(s) Oral every 8 hours  multiple electrolytes Injection Type 1 1000 milliLiter(s) (125 mL/Hr) IV Continuous <Continuous>  pantoprazole   Suspension 40 milliGRAM(s) Oral before lunch  petrolatum Ophthalmic Ointment 1 Application(s) Both EYES every 6 hours  senna 2 Tablet(s) Oral at bedtime  thiamine 100 milliGRAM(s) Oral daily    MEDICATIONS  (PRN):  acetaminophen    Suspension 650 milliGRAM(s) Oral every 6 hours PRN For Temp greater than 38 C (100.4 F)  hydrALAZINE Injectable 20 milliGRAM(s) IV Push every 6 hours PRN systolic > 150  labetalol Injectable 20 milliGRAM(s) IV Push every 2 hours PRN systolic > 150  lactulose Syrup 20 Gram(s) Oral every 6 hours PRN Constpation  polyethylene glycol 3350 17 Gram(s) Oral daily PRN Constipation      PHYSICAL EXAM:    Vital Signs Last 24 Hrs  T(C): 37 (10 May 2018 14:25), Max: 38 (10 May 2018 04:00)  T(F): 98.6 (10 May 2018 14:25), Max: 100.4 (10 May 2018 04:00)  HR: 75 (10 May 2018 14:55) (72 - 97)  BP: 143/81 (10 May 2018 14:55) (100/59 - 157/85)  BP(mean): 106 (10 May 2018 14:55) (73 - 114)  RR: 15 (10 May 2018 14:55) (12 - 33)  SpO2: 100% (10 May 2018 14:55) (98% - 100%)    General: lethargic     Karnofsky:  20%    HEENT: ET tube    Lungs: comfortable     CV: normal      GI: normal     : bush    MSK: weakness; anasarca       LABS:                          9.0    12.4  )-----------( 511      ( 10 May 2018 04:00 )             29.2     05-10    142  |  100  |  17.0  ----------------------------<  112  4.3   |  33.0<H>  |  0.53    Ca    9.6      10 May 2018 04:00  Phos  3.2     05-10  Mg     2.2     05-10    TPro  7.3  /  Alb  2.7<L>  /  TBili  <0.2<L>  /  DBili  x   /  AST  28  /  ALT  41<H>  /  AlkPhos  116  05-10    PT/INR - ( 10 May 2018 04:00 )   PT: 11.4 sec;   INR: 1.04 ratio       PTT - ( 10 May 2018 04:00 )  PTT:28.0 sec    I&O's Summary    09 May 2018 07:01  -  10 May 2018 07:00  --------------------------------------------------------  IN: 1120 mL / OUT: 1215 mL / NET: -95 mL    10 May 2018 07:01  -  10 May 2018 15:00  --------------------------------------------------------  IN: 0 mL / OUT: 380 mL / NET: -380 mL        RADIOLOGY & ADDITIONAL STUDIES:    ADVANCE DIRECTIVES:   DNR YES NO  Completed on:                     MOLST  YES NO   Completed on:  Living Will  YES NO   Completed on:

## 2018-05-10 NOTE — PROGRESS NOTE ADULT - PROBLEM SELECTOR PLAN 9
family to pursue tracheostomy/PEG. Continued family support. Palliative care is following.

## 2018-05-10 NOTE — PROGRESS NOTE ADULT - PROBLEM SELECTOR PLAN 4
- spoke to Capo, now that she has the trach, I discussed with him establishing directives. I discussed with him that at this point if she were to go into cardiac arrest, CPR is unlikely to be beneficial. He understands, and he will discuss with the rest of her family and get back to me.

## 2018-05-10 NOTE — PROGRESS NOTE ADULT - PROBLEM SELECTOR PLAN 2
Continue on 10/8, may trial low dose anxiolytic or analgesic for underlying cause of tachypnea ?  NPO with plan for trach today

## 2018-05-10 NOTE — BRIEF OPERATIVE NOTE - PROCEDURE
<<-----Click on this checkbox to enter Procedure PEG (percutaneous endoscopic gastrostomy)  05/10/2018    Active  TBURNS2  Tracheostomy tube placement  05/10/2018    Active  TBURNS2

## 2018-05-10 NOTE — PROGRESS NOTE ADULT - ASSESSMENT
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     1) ICH ( intracranial hemorrhage )  Avoid antiplatelet.   Continue blood pressure control.   Supportive care/vent management.  Overall prognosis remains poor.   Family has elected for tracheostomy/PEG. These are scheduled for today    2) Altered mental status  Secondary to intracranial hemorrhage.     3) Hypertension  Continue blood pressure control.     Case discussed with Dr. Wang    will follow with you    Anirudh Abarca MD PhD   [id number]

## 2018-05-10 NOTE — PROGRESS NOTE ADULT - SUBJECTIVE AND OBJECTIVE BOX
Patient is a 63y old  Female who presents with a chief complaint of complained of dizzyness and collapsed at home (23 Apr 2018 10:59)      BRIEF HOSPITAL COURSE: 64 yo female, PMHx EtOH abuse, admitted on 4/23/18 with dizziness and syncope. Upon arrival in the ED she was obtunded and intubated for airway support. Her BP was noted to be elevated. 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 There was no evidence of aneurysm or dissection noted on CTA. IV Cardene initiated for BP control. Poor neurological exam. Neurosurgery consulted, no neurosurgical intervention to be of benefit. Remains in [facility] with poor neurologic exam.    Events last 24 hours: Remains intubated, patient was on PS 8/5 tachypneic with RR > 30 and low tidal volumes. When placed on AC, patient becomes more tachypneic with increased peak pressures >40. Increased to PS 10/8 with some improvement, RR 20's and Tv 300's (Ppeak 14).     PAST MEDICAL & SURGICAL HISTORY:  Alcohol abuse  No significant past surgical history      Review of Systems: Due to altered mental status/intubation, subjective information was not able to be obtained from the patient. History was obtained, to the extent possible, from review of the chart and collateral sources of information.      Medications:  cephalexin 500 milliGRAM(s) Oral four times a day  amLODIPine   Tablet 10 milliGRAM(s) Oral daily  hydrALAZINE Injectable 20 milliGRAM(s) IV Push every 6 hours PRN  labetalol 200 milliGRAM(s) Oral every 8 hours  labetalol Injectable 20 milliGRAM(s) IV Push every 2 hours PRN  acetaminophen    Suspension 650 milliGRAM(s) Oral every 6 hours PRN  bisacodyl Suppository 10 milliGRAM(s) Rectal daily  lactulose Syrup 20 Gram(s) Oral every 6 hours PRN  pantoprazole   Suspension 40 milliGRAM(s) Oral before lunch  polyethylene glycol 3350 17 Gram(s) Oral daily PRN  senna 2 Tablet(s) Oral at bedtime  folic acid 1 milliGRAM(s) Oral daily  thiamine 100 milliGRAM(s) Oral daily  chlorhexidine 0.12% Liquid 15 milliLiter(s) Swish and Spit two times a day  petrolatum Ophthalmic Ointment 1 Application(s) Both EYES every 6 hours        Mode: CPAP with PS  FiO2: 30  PEEP: 8  PS: 10  MAP: 13      ICU Vital Signs Last 24 Hrs  T(C): 37.7 (10 May 2018 00:00), Max: 38 (09 May 2018 15:00)  T(F): 99.9 (10 May 2018 00:00), Max: 100.4 (09 May 2018 15:00)  HR: 87 (10 May 2018 00:06) (70 - 101)  BP: 123/66 (10 May 2018 00:00) (103/59 - 149/67)  BP(mean): 88 (10 May 2018 00:00) (76 - 106)  ABP: --  ABP(mean): --  RR: 25 (10 May 2018 00:06) (18 - 36)  SpO2: 100% (10 May 2018 00:06) (98% - 100%)          I&O's Detail    08 May 2018 07:01  -  09 May 2018 07:00  --------------------------------------------------------  IN:    Enteral Tube Flush: 800 mL    Jevity: 1440 mL  Total IN: 2240 mL    OUT:    Indwelling Catheter - Urethral: 1610 mL  Total OUT: 1610 mL    Total NET: 630 mL      09 May 2018 07:01  -  10 May 2018 03:00  --------------------------------------------------------  IN:    Enteral Tube Flush: 400 mL    Jevity: 720 mL  Total IN: 1120 mL    OUT:    Indwelling Catheter - Urethral: 880 mL  Total OUT: 880 mL    Total NET: 240 mL            LABS:                        9.0    10.5  )-----------( 468      ( 09 May 2018 05:29 )             28.9     05-09    141  |  100  |  16.0  ----------------------------<  155<H>  4.3   |  31.0<H>  |  0.49<L>    Ca    9.3      09 May 2018 05:29  Phos  3.1     05-09  Mg     2.2     05-09    TPro  7.0  /  Alb  2.4<L>  /  TBili  <0.2<L>  /  DBili  x   /  AST  29  /  ALT  43<H>  /  AlkPhos  121<H>  05-09          CAPILLARY BLOOD GLUCOSE        PT/INR - ( 08 May 2018 11:18 )   PT: 11.1 sec;   INR: 1.01 ratio         PTT - ( 08 May 2018 11:18 )  PTT:29.6 sec    CULTURES:  Culture Results:   Numerous Staphylococcus aureus  No Routine respiratory ruddy present (05-04 @ 04:32)      General: No acute distress.      HEENT: Pupils 2mm equal, reactive to light, sluggish. Symmetric.    PULM: Intubated. Coarse to auscultation bilaterally    CVS: Regular rate and rhythm, no murmurs, rubs, or gallops    ABD: Soft, nondistended    EXT: Generalized anasarca    NEURO: Vertical nystagmus. Minimally reactive 2mm pupils, eyes open spontaneously, no gag, +overbreathing vent on PS, does not follow commands or make purposeful movements.    CAM ICU: +  CODE STATUS: FULL    CRITICAL CARE TIME SPENT: 35 minutes assessing presenting problems of acute illness, which pose high probability of life threatening deterioration or end organ damage/dysfunction, as well as medical decision making including initiating plan of care, reviewing data, reviewing radiologic exams, discussing with multidisciplinary team, non-inclusive of procedures performed, discussing goals of care with patient/family

## 2018-05-10 NOTE — PROGRESS NOTE ADULT - ASSESSMENT
62 yo female, admitted to MICU with hypertensive emergency, pontine hemorrhage, respiratory failure, requiring Cardene drip for BP control now weaned off, remains intubated with poor neuro exam.

## 2018-05-10 NOTE — PROGRESS NOTE ADULT - ATTENDING COMMENTS
Thank you for the opportunity to assist with the care of this patient.   [facility] [phone].

## 2018-05-10 NOTE — PROGRESS NOTE ADULT - SUBJECTIVE AND OBJECTIVE BOX
[facility]                                        [facility] [place]                                 Rima Whiteside, & Saad                                  [address]                                        [place], [place], [address]                                             [phone]          CC: Pontine intracranial hemorrhage.  HPI:  63y Female who c/o dizziness and then collapsed at home.  She had poor mental status and was intubated in the ED. There was no associated seizure activity.   She was found to have a large brainstem intracranial hemorrhage on CT.  She has remained on mechanical ventilator since admission.     Interim history:  She has remained on mechanical ventilator.  She follows simple commands intermittently.  Family has decided for trach/PEG- to be done today    ROS:   Unobtainable due to patient's condition.     MEDICATIONS  (STANDING):  amLODIPine   Tablet 10 milliGRAM(s) Oral daily  bisacodyl Suppository 10 milliGRAM(s) Rectal daily  cephalexin 500 milliGRAM(s) Oral four times a day  chlorhexidine 0.12% Liquid 15 milliLiter(s) Swish and Spit two times a day  folic acid 1 milliGRAM(s) Oral daily  labetalol 200 milliGRAM(s) Oral every 8 hours  pantoprazole   Suspension 40 milliGRAM(s) Oral before lunch  petrolatum Ophthalmic Ointment 1 Application(s) Both EYES every 6 hours  senna 2 Tablet(s) Oral at bedtime  thiamine 100 milliGRAM(s) Oral daily    MEDICATIONS  (PRN):  acetaminophen    Suspension 650 milliGRAM(s) Oral every 6 hours PRN For Temp greater than 38 C (100.4 F)  hydrALAZINE Injectable 20 milliGRAM(s) IV Push every 6 hours PRN systolic > 150  labetalol Injectable 20 milliGRAM(s) IV Push every 2 hours PRN systolic > 150  lactulose Syrup 20 Gram(s) Oral every 6 hours PRN Constpation  polyethylene glycol 3350 17 Gram(s) Oral daily PRN Constipation    Vital Signs Last 24 Hrs  T(C): 37.8 (10 May 2018 12:00), Max: 38 (09 May 2018 15:00)  T(F): 100 (10 May 2018 12:00), Max: 100.4 (09 May 2018 15:00)  HR: 83 (10 May 2018 12:53) (72 - 97)  BP: 124/65 (10 May 2018 12:53) (100/59 - 152/83)  BP(mean): 89 (10 May 2018 12:53) (73 - 111)  RR: 28 (10 May 2018 12:53) (13 - 33)  SpO2: 98% (10 May 2018 12:53) (98% - 100%)    Detailed Neurologic Exam:    Mental status: Eyes open,   vertical eye movements, only follows simple command to wiggle toes.     Cranial nerves: Pupils pinpoint. There is slight blink to threat. There is roving eye movement in the vertical plane. Palate and tongue cannot be assessed.     Motor/Sensory:  There is normal bulk and tone.  There is no tremor.  There is minimal extensor posturing bilaterally to stimuli.  Minimal purposeful movement, but toes wiggle to request.    Reflexes: Trace throughout and plantar responses are extensor bilaterally.    Cerebellar: Cannot be tested.    Labs:                           9.0    12.4  )-----------( 511      ( 10 May 2018 04:00 )             29.2     05-10    142  |  100  |  17.0  ----------------------------<  112  4.3   |  33.0<H>  |  0.53    Ca    9.6      10 May 2018 04:00  Phos  3.2     05-10  Mg     2.2     05-10    TPro  7.3  /  Alb  2.7<L>  /  TBili  <0.2<L>  /  DBili  x   /  AST  28  /  ALT  41<H>  /  AlkPhos  116  05-10    LIVER FUNCTIONS - ( 10 May 2018 04:00 )  Alb: 2.7 g/dL / Pro: 7.3 g/dL / ALK PHOS: 116 U/L / ALT: 41 U/L / AST: 28 U/L / GGT: x           PT/INR - ( 10 May 2018 04:00 )   PT: 11.4 sec;   INR: 1.04 ratio         PTT - ( 10 May 2018 04:00 )  PTT:28.0 sec    Radiology  Recent neurological studies: none

## 2018-05-10 NOTE — PROGRESS NOTE ADULT - PROBLEM SELECTOR PLAN 1
BP controlled on present regimen  Continue serial neuro exams, neuro status remains poor  Palliative Care team following

## 2018-05-10 NOTE — BRIEF OPERATIVE NOTE - PRE-OP DX
Dysphagia due to recent cerebrovascular accident (CVA)  05/10/2018    Active  Arnold Prajapati  Respiratory failure, acute neuromuscular  05/10/2018  secondary to pontine hemorrhage  Active  PrajapatiArnold

## 2018-05-11 LAB
ABO RH CONFIRMATION: SIGNIFICANT CHANGE UP
ANION GAP SERPL CALC-SCNC: 11 MMOL/L — SIGNIFICANT CHANGE UP (ref 5–17)
ANION GAP SERPL CALC-SCNC: 12 MMOL/L — SIGNIFICANT CHANGE UP (ref 5–17)
BUN SERPL-MCNC: 20 MG/DL — SIGNIFICANT CHANGE UP (ref 8–20)
BUN SERPL-MCNC: 20 MG/DL — SIGNIFICANT CHANGE UP (ref 8–20)
CALCIUM SERPL-MCNC: 9.2 MG/DL — SIGNIFICANT CHANGE UP (ref 8.6–10.2)
CALCIUM SERPL-MCNC: 9.3 MG/DL — SIGNIFICANT CHANGE UP (ref 8.6–10.2)
CHLORIDE SERPL-SCNC: 99 MMOL/L — SIGNIFICANT CHANGE UP (ref 98–107)
CHLORIDE SERPL-SCNC: 99 MMOL/L — SIGNIFICANT CHANGE UP (ref 98–107)
CK SERPL-CCNC: 78 U/L — SIGNIFICANT CHANGE UP (ref 25–170)
CO2 SERPL-SCNC: 28 MMOL/L — SIGNIFICANT CHANGE UP (ref 22–29)
CO2 SERPL-SCNC: 29 MMOL/L — SIGNIFICANT CHANGE UP (ref 22–29)
CREAT SERPL-MCNC: 0.49 MG/DL — LOW (ref 0.5–1.3)
CREAT SERPL-MCNC: 0.54 MG/DL — SIGNIFICANT CHANGE UP (ref 0.5–1.3)
GLUCOSE SERPL-MCNC: 101 MG/DL — SIGNIFICANT CHANGE UP (ref 70–115)
GLUCOSE SERPL-MCNC: 118 MG/DL — HIGH (ref 70–115)
HCT VFR BLD CALC: 26.8 % — LOW (ref 37–47)
HCT VFR BLD CALC: 28.8 % — LOW (ref 37–47)
HGB BLD-MCNC: 8.4 G/DL — LOW (ref 12–16)
HGB BLD-MCNC: 8.9 G/DL — LOW (ref 12–16)
MAGNESIUM SERPL-MCNC: 2.3 MG/DL — SIGNIFICANT CHANGE UP (ref 1.6–2.6)
MCHC RBC-ENTMCNC: 30.2 PG — SIGNIFICANT CHANGE UP (ref 27–31)
MCHC RBC-ENTMCNC: 30.8 PG — SIGNIFICANT CHANGE UP (ref 27–31)
MCHC RBC-ENTMCNC: 30.9 G/DL — LOW (ref 32–36)
MCHC RBC-ENTMCNC: 31.3 G/DL — LOW (ref 32–36)
MCV RBC AUTO: 97.6 FL — SIGNIFICANT CHANGE UP (ref 81–99)
MCV RBC AUTO: 98.2 FL — SIGNIFICANT CHANGE UP (ref 81–99)
PHOSPHATE SERPL-MCNC: 3.5 MG/DL — SIGNIFICANT CHANGE UP (ref 2.4–4.7)
PLATELET # BLD AUTO: 449 K/UL — HIGH (ref 150–400)
PLATELET # BLD AUTO: 486 K/UL — HIGH (ref 150–400)
POTASSIUM SERPL-MCNC: 4 MMOL/L — SIGNIFICANT CHANGE UP (ref 3.5–5.3)
POTASSIUM SERPL-MCNC: 4.1 MMOL/L — SIGNIFICANT CHANGE UP (ref 3.5–5.3)
POTASSIUM SERPL-SCNC: 4 MMOL/L — SIGNIFICANT CHANGE UP (ref 3.5–5.3)
POTASSIUM SERPL-SCNC: 4.1 MMOL/L — SIGNIFICANT CHANGE UP (ref 3.5–5.3)
RBC # BLD: 2.73 M/UL — LOW (ref 4.4–5.2)
RBC # BLD: 2.95 M/UL — LOW (ref 4.4–5.2)
RBC # FLD: 13.9 % — SIGNIFICANT CHANGE UP (ref 11–15.6)
RBC # FLD: 14.1 % — SIGNIFICANT CHANGE UP (ref 11–15.6)
SODIUM SERPL-SCNC: 138 MMOL/L — SIGNIFICANT CHANGE UP (ref 135–145)
SODIUM SERPL-SCNC: 140 MMOL/L — SIGNIFICANT CHANGE UP (ref 135–145)
TROPONIN T SERPL-MCNC: <0.01 NG/ML — SIGNIFICANT CHANGE UP (ref 0–0.06)
WBC # BLD: 11.4 K/UL — HIGH (ref 4.8–10.8)
WBC # BLD: 11.4 K/UL — HIGH (ref 4.8–10.8)
WBC # FLD AUTO: 11.4 K/UL — HIGH (ref 4.8–10.8)
WBC # FLD AUTO: 11.4 K/UL — HIGH (ref 4.8–10.8)

## 2018-05-11 PROCEDURE — 99233 SBSQ HOSP IP/OBS HIGH 50: CPT

## 2018-05-11 PROCEDURE — 99232 SBSQ HOSP IP/OBS MODERATE 35: CPT

## 2018-05-11 RX ORDER — ATORVASTATIN CALCIUM 80 MG/1
40 TABLET, FILM COATED ORAL AT BEDTIME
Qty: 0 | Refills: 0 | Status: DISCONTINUED | OUTPATIENT
Start: 2018-05-11 | End: 2018-05-25

## 2018-05-11 RX ORDER — LISINOPRIL 2.5 MG/1
5 TABLET ORAL DAILY
Qty: 0 | Refills: 0 | Status: DISCONTINUED | OUTPATIENT
Start: 2018-05-11 | End: 2018-05-17

## 2018-05-11 RX ADMIN — AMLODIPINE BESYLATE 10 MILLIGRAM(S): 2.5 TABLET ORAL at 05:22

## 2018-05-11 RX ADMIN — CHLORHEXIDINE GLUCONATE 15 MILLILITER(S): 213 SOLUTION TOPICAL at 05:22

## 2018-05-11 RX ADMIN — Medication 500 MILLIGRAM(S): at 11:30

## 2018-05-11 RX ADMIN — Medication 1 APPLICATION(S): at 05:22

## 2018-05-11 RX ADMIN — Medication 500 MILLIGRAM(S): at 17:11

## 2018-05-11 RX ADMIN — CHLORHEXIDINE GLUCONATE 15 MILLILITER(S): 213 SOLUTION TOPICAL at 17:10

## 2018-05-11 RX ADMIN — Medication 650 MILLIGRAM(S): at 22:10

## 2018-05-11 RX ADMIN — Medication 1 MILLIGRAM(S): at 11:30

## 2018-05-11 RX ADMIN — Medication 1 APPLICATION(S): at 23:59

## 2018-05-11 RX ADMIN — ATORVASTATIN CALCIUM 40 MILLIGRAM(S): 80 TABLET, FILM COATED ORAL at 22:10

## 2018-05-11 RX ADMIN — Medication 10 MILLIGRAM(S): at 14:00

## 2018-05-11 RX ADMIN — SENNA PLUS 2 TABLET(S): 8.6 TABLET ORAL at 22:10

## 2018-05-11 RX ADMIN — Medication 200 MILLIGRAM(S): at 05:22

## 2018-05-11 RX ADMIN — Medication 200 MILLIGRAM(S): at 15:04

## 2018-05-11 RX ADMIN — Medication 100 MILLIGRAM(S): at 11:30

## 2018-05-11 RX ADMIN — ENOXAPARIN SODIUM 40 MILLIGRAM(S): 100 INJECTION SUBCUTANEOUS at 11:30

## 2018-05-11 RX ADMIN — Medication 1 APPLICATION(S): at 17:11

## 2018-05-11 RX ADMIN — PANTOPRAZOLE SODIUM 40 MILLIGRAM(S): 20 TABLET, DELAYED RELEASE ORAL at 11:30

## 2018-05-11 RX ADMIN — Medication 500 MILLIGRAM(S): at 05:22

## 2018-05-11 RX ADMIN — Medication 1 APPLICATION(S): at 11:30

## 2018-05-11 RX ADMIN — Medication 500 MILLIGRAM(S): at 23:59

## 2018-05-11 NOTE — PROGRESS NOTE ADULT - PROBLEM SELECTOR PLAN 4
poor neurologic exam. Patient's poor prognosis discussed with family.  continue q4 hour neuro checks

## 2018-05-11 NOTE — PROGRESS NOTE ADULT - PROBLEM SELECTOR PLAN 2
s/p trach and PEG 5/10/18  Wean FiO2 as tolerated, with trials of vent weaning once trach matures  NPO until PEG matures  Peridex oral care and PPI for GI ppx

## 2018-05-11 NOTE — PROGRESS NOTE ADULT - SUBJECTIVE AND OBJECTIVE BOX
FAUSTO BARRETO    [id number]    63y      Female    INTERVAL HPI/OVERNIGHT EVENTS:    med accept note:  Patient is a 62F with a history of alcoholism, who presented with dizziness and then collapsed at home. Upon arrival in the ER she was obtunded and intubated for airway support. He BP was noted to be elevated. 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 They was no evidence of aneurysm or dissection noted on CTA. Her BP wascontrolled on IV Cardene. Neurologically, she was unresponsive, her pupils were small and nonreactive, there was a gag reflex, and she withdrew her legs and left hand to pain.    Neurosurgery was consulted and confirmed there was no surgical or non-invasive intervention which could be offered.  Patient brought to MICU and started on bp meds and seen by neuro and palliative in consult    Patient on 5/10  PEG (percutaneous endoscopic gastrostomy)  05/10/2018    Active  TBURNS2  Tracheostomy tube placement  05/10/2018    Active  TBURNS2.      REVIEW OF SYSTEMS:    CONSTITUTIONAL: No fever, weight loss, or fatigue  RESPIRATORY: No cough, wheezing, hemoptysis; No shortness of breath  CARDIOVASCULAR: No chest pain, palpitations  GASTROINTESTINAL: No abdominal or epigastric pain. No nausea, vomiting  NEUROLOGICAL: No headaches, memory loss, loss of strength.  MISCELLANEOUS:      Vital Signs Last 24 Hrs  T(C): 37.6 (11 May 2018 12:00), Max: 38.6 (10 May 2018 20:00)  T(F): 99.7 (11 May 2018 12:00), Max: 101.5 (10 May 2018 20:00)  HR: 72 (11 May 2018 12:45) (72 - 83)  BP: 123/58 (11 May 2018 12:00) (107/63 - 166/82)  BP(mean): 84 (11 May 2018 12:00) (79 - 114)  RR: 23 (11 May 2018 12:00) (12 - 73)  SpO2: 100% (11 May 2018 12:45) (95% - 100%)    PHYSICAL EXAM:    GENERAL: NAD, well-groomed  HEENT: PERRL, +EOMI  NECK: soft, Supple, No JVD,   CHEST/LUNG: Clear to auscultation bilaterally; No wheezing  HEART: S1S2+, Regular rate and rhythm; No murmurs, rubs, or gallops  ABDOMEN: Soft, Nontender, Nondistended; Bowel sounds present  EXTREMITIES:  2+ Peripheral Pulses, No clubbing, cyanosis, or edema  SKIN: No rashes or lesions  NEURO: AAOX3, no focal deficits, no motor r sensory loss  PSYCH: normal mood      LABS:                        8.4    11.4  )-----------( 449      ( 11 May 2018 07:04 )             26.8     05-11    140  |  99  |  20.0  ----------------------------<  118<H>  4.0   |  29.0  |  0.49<L>    Ca    9.3      11 May 2018 07:04  Phos  3.5     05-11  Mg     2.3     05-11    TPro  7.3  /  Alb  2.7<L>  /  TBili  <0.2<L>  /  DBili  x   /  AST  28  /  ALT  41<H>  /  AlkPhos  116  05-10    PT/INR - ( 10 May 2018 04:00 )   PT: 11.4 sec;   INR: 1.04 ratio         PTT - ( 10 May 2018 04:00 )  PTT:28.0 sec        MEDICATIONS  (STANDING):  amLODIPine   Tablet 10 milliGRAM(s) Oral daily  bisacodyl Suppository 10 milliGRAM(s) Rectal daily  cephalexin 500 milliGRAM(s) Oral four times a day  chlorhexidine 0.12% Liquid 15 milliLiter(s) Swish and Spit two times a day  enoxaparin Injectable 40 milliGRAM(s) SubCutaneous daily  folic acid 1 milliGRAM(s) Oral daily  labetalol 200 milliGRAM(s) Oral every 8 hours  multiple electrolytes Injection Type 1 1000 milliLiter(s) (125 mL/Hr) IV Continuous <Continuous>  pantoprazole   Suspension 40 milliGRAM(s) Oral before lunch  petrolatum Ophthalmic Ointment 1 Application(s) Both EYES every 6 hours  senna 2 Tablet(s) Oral at bedtime  thiamine 100 milliGRAM(s) Oral daily    MEDICATIONS  (PRN):  acetaminophen    Suspension 650 milliGRAM(s) Oral every 6 hours PRN For Temp greater than 38 C (100.4 F)  hydrALAZINE Injectable 20 milliGRAM(s) IV Push every 6 hours PRN systolic > 150  labetalol Injectable 20 milliGRAM(s) IV Push every 2 hours PRN systolic > 150  lactulose Syrup 20 Gram(s) Oral every 6 hours PRN Constpation  polyethylene glycol 3350 17 Gram(s) Oral daily PRN Constipation      RADIOLOGY & ADDITIONAL TESTS: FAUSTO BARRETO    [id number]    63y      Female    INTERVAL HPI/OVERNIGHT EVENTS:    med accept note:  Patient is a 62F with a history of alcoholism, who presented with dizziness and then collapsed at home. Upon arrival in the ER she was obtunded and intubated for airway support. He BP was noted to be elevated. 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 They was no evidence of aneurysm or dissection noted on CTA. Her BP wascontrolled on IV Cardene. Neurologically, she was unresponsive, her pupils were small and nonreactive, there was a gag reflex, and she withdrew her legs and left hand to pain.    Neurosurgery was consulted and confirmed there was no surgical or non-invasive intervention which could be offered.  Patient brought to MICU and started on bp meds and seen by neuro and palliative in consult    Patient on 5/10  PEG (percutaneous endoscopic gastrostomy)  05/10/2018    Active  TBURNS2  Tracheostomy tube placement  05/10/2018    Active  TBURNS2.    Patient started on keflex for trachtis and is now being downgraded to medicine. Patient seen at bedside and is making random movements but no purposeful movements. Patient slightly agitated    tmax 101.5      REVIEW OF SYSTEMS:    unable to obtain secondary to mental status     Vital Signs Last 24 Hrs  T(C): 37.6 (11 May 2018 12:00), Max: 38.6 (10 May 2018 20:00)  T(F): 99.7 (11 May 2018 12:00), Max: 101.5 (10 May 2018 20:00)  HR: 72 (11 May 2018 12:45) (72 - 83)  BP: 123/58 (11 May 2018 12:00) (107/63 - 166/82)  BP(mean): 84 (11 May 2018 12:00) (79 - 114)  RR: 23 (11 May 2018 12:00) (12 - 73)  SpO2: 100% (11 May 2018 12:45) (95% - 100%)    PHYSICAL EXAM:    GENERAL: anxious, awake  NECK: trach   CHEST/LUNG: coarse breath sounds  HEART: S1S2+, Regular rate and rhythm; No murmurs  ABDOMEN: Soft, peg  EXTREMITIES:  no edema   SKIN: No rashes or lesions  NEURO: awake. withdraws from pain       LABS:                        8.4    11.4  )-----------( 449      ( 11 May 2018 07:04 )             26.8     05-11    140  |  99  |  20.0  ----------------------------<  118<H>  4.0   |  29.0  |  0.49<L>    Ca    9.3      11 May 2018 07:04  Phos  3.5     05-11  Mg     2.3     05-11    TPro  7.3  /  Alb  2.7<L>  /  TBili  <0.2<L>  /  DBili  x   /  AST  28  /  ALT  41<H>  /  AlkPhos  116  05-10    PT/INR - ( 10 May 2018 04:00 )   PT: 11.4 sec;   INR: 1.04 ratio         PTT - ( 10 May 2018 04:00 )  PTT:28.0 sec        MEDICATIONS  (STANDING):  amLODIPine   Tablet 10 milliGRAM(s) Oral daily  bisacodyl Suppository 10 milliGRAM(s) Rectal daily  cephalexin 500 milliGRAM(s) Oral four times a day  chlorhexidine 0.12% Liquid 15 milliLiter(s) Swish and Spit two times a day  enoxaparin Injectable 40 milliGRAM(s) SubCutaneous daily  folic acid 1 milliGRAM(s) Oral daily  labetalol 200 milliGRAM(s) Oral every 8 hours  multiple electrolytes Injection Type 1 1000 milliLiter(s) (125 mL/Hr) IV Continuous <Continuous>  pantoprazole   Suspension 40 milliGRAM(s) Oral before lunch  petrolatum Ophthalmic Ointment 1 Application(s) Both EYES every 6 hours  senna 2 Tablet(s) Oral at bedtime  thiamine 100 milliGRAM(s) Oral daily    MEDICATIONS  (PRN):  acetaminophen    Suspension 650 milliGRAM(s) Oral every 6 hours PRN For Temp greater than 38 C (100.4 F)  hydrALAZINE Injectable 20 milliGRAM(s) IV Push every 6 hours PRN systolic > 150  labetalol Injectable 20 milliGRAM(s) IV Push every 2 hours PRN systolic > 150  lactulose Syrup 20 Gram(s) Oral every 6 hours PRN Constpation  polyethylene glycol 3350 17 Gram(s) Oral daily PRN Constipation      RADIOLOGY & ADDITIONAL TESTS:

## 2018-05-11 NOTE — PROGRESS NOTE ADULT - ASSESSMENT
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     1) ICH ( intracranial hemorrhage )  Avoid antiplatelet.   Continue blood pressure control.   Supportive care/vent management.  Overall prognosis remains poor.   Family has elected for tracheostomy/PEG.    2) Altered mental status  Secondary to intracranial hemorrhage.     2) Hypertension  Continue blood pressure control.     For eventual transfer to [address] [place] [address].

## 2018-05-11 NOTE — PROGRESS NOTE ADULT - SUBJECTIVE AND OBJECTIVE BOX
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 She was intubated for airway protection and monitored for neurologic improvement. Her course was complicated by tracheitis and BP control. Neurosurgery was consulted and stated there was no surgical intervention indicated. She has had poor neurologic improvement over the course of her hospital stay and family elected for a tracheostomy, which was performed on 05/10.    Events last 24 hours: s/p surgical tracheostomy. Patient remains stable postoperatively and was started on maintenance fluids while NPO after the procedure. Care was transferred to Dr. Jose and patient is awaiting placement for vent monitored bed on 6 tower.    PAST MEDICAL & SURGICAL HISTORY:  Alcohol abuse  No significant past surgical history      Review of Systems:  CONSTITUTIONAL: No fever, chills, or fatigue  EYES: No eye pain, visual disturbances, or discharge  ENMT:  No difficulty hearing, tinnitus, vertigo; No sinus or throat pain  NECK: No pain or stiffness  RESPIRATORY: No cough, wheezing, chills or hemoptysis; No shortness of breath  CARDIOVASCULAR: No chest pain, palpitations, dizziness, or leg swelling  GASTROINTESTINAL: No abdominal or epigastric pain. No nausea, vomiting, or hematemesis; No diarrhea or constipation. No melena or hematochezia.  GENITOURINARY: No dysuria, frequency, hematuria, or incontinence  NEUROLOGICAL: No headaches, memory loss, loss of strength, numbness, or tremors  SKIN: No itching, burning, rashes, or lesions   MUSCULOSKELETAL: No joint pain or swelling; No muscle, back, or extremity pain  PSYCHIATRIC: No depression, anxiety, mood swings, or difficulty sleeping      Medications:  cephalexin 500 milliGRAM(s) Oral four times a day    amLODIPine   Tablet 10 milliGRAM(s) Oral daily  hydrALAZINE Injectable 20 milliGRAM(s) IV Push every 6 hours PRN  labetalol 200 milliGRAM(s) Oral every 8 hours  labetalol Injectable 20 milliGRAM(s) IV Push every 2 hours PRN      acetaminophen    Suspension 650 milliGRAM(s) Oral every 6 hours PRN      enoxaparin Injectable 40 milliGRAM(s) SubCutaneous daily    bisacodyl Suppository 10 milliGRAM(s) Rectal daily  lactulose Syrup 20 Gram(s) Oral every 6 hours PRN  pantoprazole   Suspension 40 milliGRAM(s) Oral before lunch  polyethylene glycol 3350 17 Gram(s) Oral daily PRN  senna 2 Tablet(s) Oral at bedtime      atorvastatin 40 milliGRAM(s) Oral at bedtime    folic acid 1 milliGRAM(s) Oral daily  multiple electrolytes Injection Type 1 1000 milliLiter(s) IV Continuous <Continuous>  thiamine 100 milliGRAM(s) Oral daily      chlorhexidine 0.12% Liquid 15 milliLiter(s) Swish and Spit two times a day  petrolatum Ophthalmic Ointment 1 Application(s) Both EYES every 6 hours        Mode: CPAP with PS  FiO2: 40  PEEP: 8  PS: 10  MAP: 11      ICU Vital Signs Last 24 Hrs  T(C): 37.5 (11 May 2018 13:00), Max: 38.6 (10 May 2018 20:00)  T(F): 99.5 (11 May 2018 13:00), Max: 101.5 (10 May 2018 20:00)  HR: 77 (11 May 2018 13:00) (72 - 83)  BP: 119/64 (11 May 2018 13:00) (107/63 - 166/82)  BP(mean): 85 (11 May 2018 13:00) (79 - 114)  ABP: --  ABP(mean): --  RR: 25 (11 May 2018 13:00) (12 - 73)  SpO2: 100% (11 May 2018 13:00) (95% - 100%)          I&O's Detail    10 May 2018 07:01  -  11 May 2018 07:00  --------------------------------------------------------  IN:    multiple electrolytes Injection Type 1: 2000 mL  Total IN: 2000 mL    OUT:    Indwelling Catheter - Urethral: 1685 mL  Total OUT: 1685 mL    Total NET: 315 mL      11 May 2018 07:01  -  11 May 2018 13:51  --------------------------------------------------------  IN:    Enteral Tube Flush: 200 mL    multiple electrolytes Injection Type 1: 750 mL  Total IN: 950 mL    OUT:    Indwelling Catheter - Urethral: 275 mL  Total OUT: 275 mL    Total NET: 675 mL            LABS:                        8.4    11.4  )-----------( 449      ( 11 May 2018 07:04 )             26.8     05-11    140  |  99  |  20.0  ----------------------------<  118<H>  4.0   |  29.0  |  0.49<L>    Ca    9.3      11 May 2018 07:04  Phos  3.5     05-11  Mg     2.3     05-11    TPro  7.3  /  Alb  2.7<L>  /  TBili  <0.2<L>  /  DBili  x   /  AST  28  /  ALT  41<H>  /  AlkPhos  116  05-10          CAPILLARY BLOOD GLUCOSE        PT/INR - ( 10 May 2018 04:00 )   PT: 11.4 sec;   INR: 1.04 ratio         PTT - ( 10 May 2018 04:00 )  PTT:28.0 sec    CULTURES:      Physical Examination:    General: No acute distress.  Alert, oriented, interactive, nonfocal    HEENT: Pupils equal, reactive to light.  Symmetric.    PULM: Clear to auscultation bilaterally, no significant sputum production    CVS: Regular rate and rhythm, no murmurs, rubs, or gallops    ABD: Soft, nondistended, nontender, normoactive bowel sounds, no masses    EXT: No edema, nontender    SKIN: Warm and well perfused, no rashes noted.    NEURO: A&Ox3, strength 5/5 all extremities, cranial nerves grossly intact, no focal deficits         Time Spent: a total of 35 minutes of time was spent evaluating/treating patient, reviewing data/labs/imaging, discussing case with multidisciplinary team, discussing plan/goals of care with patient/family. Non-inclusive of procedure time. 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 She was intubated for airway protection and monitored for neurologic improvement. Her course was complicated by tracheitis and BP control. Neurosurgery was consulted and stated there was no surgical intervention indicated. She has had poor neurologic improvement over the course of her hospital stay and family elected for a tracheostomy, which was performed on 05/10.    Events last 24 hours: s/p surgical tracheostomy. Patient remains stable postoperatively and was started on maintenance fluids while NPO after the procedure. Care was transferred to Dr. Jose and patient is awaiting placement for vent  bed on 6 tow.    PAST MEDICAL & SURGICAL HISTORY:  Alcohol abuse  No significant past surgical history      Review of Systems:  unable to obtain secondary to stroke      cephalexin 500 milliGRAM(s) Oral four times a day    amLODIPine   Tablet 10 milliGRAM(s) Oral daily  hydrALAZINE Injectable 20 milliGRAM(s) IV Push every 6 hours PRN  labetalol 200 milliGRAM(s) Oral every 8 hours  labetalol Injectable 20 milliGRAM(s) IV Push every 2 hours PRN      acetaminophen    Suspension 650 milliGRAM(s) Oral every 6 hours PRN      enoxaparin Injectable 40 milliGRAM(s) SubCutaneous daily    bisacodyl Suppository 10 milliGRAM(s) Rectal daily  lactulose Syrup 20 Gram(s) Oral every 6 hours PRN  pantoprazole   Suspension 40 milliGRAM(s) Oral before lunch  polyethylene glycol 3350 17 Gram(s) Oral daily PRN  senna 2 Tablet(s) Oral at bedtime      atorvastatin 40 milliGRAM(s) Oral at bedtime    folic acid 1 milliGRAM(s) Oral daily  multiple electrolytes Injection Type 1 1000 milliLiter(s) IV Continuous <Continuous>  thiamine 100 milliGRAM(s) Oral daily      chlorhexidine 0.12% Liquid 15 milliLiter(s) Swish and Spit two times a day  petrolatum Ophthalmic Ointment 1 Application(s) Both EYES every 6 hours        Mode: CPAP with PS  FiO2: 40  PEEP: 8  PS: 10  MAP: 11      ICU Vital Signs Last 24 Hrs  T(C): 37.5 (11 May 2018 13:00), Max: 38.6 (10 May 2018 20:00)  T(F): 99.5 (11 May 2018 13:00), Max: 101.5 (10 May 2018 20:00)  HR: 77 (11 May 2018 13:00) (72 - 83)  BP: 119/64 (11 May 2018 13:00) (107/63 - 166/82)  BP(mean): 85 (11 May 2018 13:00) (79 - 114)  ABP: --  ABP(mean): --  RR: 25 (11 May 2018 13:00) (12 - 73)  SpO2: 100% (11 May 2018 13:00) (95% - 100%)          I&O's Detail    10 May 2018 07:01  -  11 May 2018 07:00  --------------------------------------------------------  IN:    multiple electrolytes Injection Type 1: 2000 mL  Total IN: 2000 mL    OUT:    Indwelling Catheter - Urethral: 1685 mL  Total OUT: 1685 mL    Total NET: 315 mL      11 May 2018 07:01  -  11 May 2018 13:51  --------------------------------------------------------  IN:    Enteral Tube Flush: 200 mL    multiple electrolytes Injection Type 1: 750 mL  Total IN: 950 mL    OUT:    Indwelling Catheter - Urethral: 275 mL  Total OUT: 275 mL    Total NET: 675 mL            LABS:                        8.4    11.4  )-----------( 449      ( 11 May 2018 07:04 )             26.8     05-11    140  |  99  |  20.0  ----------------------------<  118<H>  4.0   |  29.0  |  0.49<L>    Ca    9.3      11 May 2018 07:04  Phos  3.5     05-11  Mg     2.3     05-11    TPro  7.3  /  Alb  2.7<L>  /  TBili  <0.2<L>  /  DBili  x   /  AST  28  /  ALT  41<H>  /  AlkPhos  116  05-10          CAPILLARY BLOOD GLUCOSE        PT/INR - ( 10 May 2018 04:00 )   PT: 11.4 sec;   INR: 1.04 ratio         PTT - ( 10 May 2018 04:00 )  PTT:28.0 sec    CULTURES:      Physical Examination:    General: No acute distress.      HEENT: Pupils equal, reactive to light.  Symmetric.    PULM: Clear to auscultation bilaterally, no significant sputum production    CVS: Regular rate and rhythm, no murmurs, rubs, or gallops    ABD: Soft, nondistended, nontender, normoactive bowel sounds, no masses    EXT: No edema, nontender    SKIN: Warm and well perfused, no rashes noted.    NEURO: inconsistant with following commands, did squeeze had

## 2018-05-11 NOTE — PROGRESS NOTE ADULT - ATTENDING COMMENTS
I have seen and evaluated the patietn and agree with the assessment and plan with the following additions:    stroke - no neurologic recovery s/p trach and peg    transfer to vent bed    fever improved

## 2018-05-11 NOTE — PROGRESS NOTE ADULT - SUBJECTIVE AND OBJECTIVE BOX
pt POD 1 s/p trach/peg, non verbal, does not follow commands  uneventful overnight, no bleeding from trach site, Surgicel removed  ROS: unable    T(C): 37.6 (05-11-18 @ 18:00), Max: 38.6 (05-10-18 @ 20:00)  HR: 73 (05-11-18 @ 18:00) (24 - 83)  BP: 101/57 (05-11-18 @ 18:00) (100/55 - 166/82)  ABP: --  ABP(mean): --  RR: 12 (05-11-18 @ 18:00) (12 - 73)  SpO2: 100% (05-11-18 @ 18:00) (98% - 100%)  PA: --  Mode: CPAP with PS  FiO2: 40  PEEP: 8  PS: 10  MAP: 12 05-11    140  |  99  |  20.0  ----------------------------<  118<H>  4.0   |  29.0  |  0.49<L>    Ca    9.3      11 May 2018 07:04  Phos  3.5     05-11  Mg     2.3     05-11    TPro  7.3  /  Alb  2.7<L>  /  TBili  <0.2<L>  /  DBili  x   /  AST  28  /  ALT  41<H>  /  AlkPhos  116  05-10                               8.4    11.4  )-----------( 449      ( 11 May 2018 07:04 )             26.8   PT/INR - ( 10 May 2018 04:00 )   PT: 11.4 sec;   INR: 1.04 ratio    PTT - ( 10 May 2018 04:00 )  PTT:28.0 sec    I&O's Detail    10 May 2018 07:01  -  11 May 2018 07:00  --------------------------------------------------------  IN:    multiple electrolytes Injection Type 1multiple electrolytes Injection Type 1: 2000 mL  Total IN: 2000 mL    OUT:    Indwelling Catheter - Urethral: 1685 mL  Total OUT: 1685 mL    Total NET: 315 mL      11 May 2018 07:01  -  11 May 2018 19:01  --------------------------------------------------------  IN:    Enteral Tube Flush: 120 mL    Enteral Tube Flush: 400 mL    Jevity: 90 mL    multiple electrolytes Injection Type 1multiple electrolytes Injection Type 1: 1125 mL  Total IN: 1735 mL    OUT:    Indwelling Catheter - Urethral: 460 mL  Total OUT: 460 mL    Total NET: 1275 mL    Drug Dosing Weight  Height (cm): 170.18 (23 Apr 2018 11:40)  Weight (kg): 66.7 (07 May 2018 07:00)  BMI (kg/m2): 23 (07 May 2018 07:00)  BSA (m2): 1.77 (07 May 2018 07:00)    MEDICATIONS  (STANDING):  amLODIPine   Tablet 10 milliGRAM(s) Oral daily  atorvastatin 40 milliGRAM(s) Oral at bedtime  bisacodyl Suppository 10 milliGRAM(s) Rectal daily  cephalexin 500 milliGRAM(s) Oral four times a day  chlorhexidine 0.12% Liquid 15 milliLiter(s) Swish and Spit two times a day  enoxaparin Injectable 40 milliGRAM(s) SubCutaneous daily  folic acid 1 milliGRAM(s) Oral daily  lisinopril 5 milliGRAM(s) Oral daily  pantoprazole   Suspension 40 milliGRAM(s) Oral before lunch  petrolatum Ophthalmic Ointment 1 Application(s) Both EYES every 6 hours  senna 2 Tablet(s) Oral at bedtime  thiamine 100 milliGRAM(s) Oral daily    MEDICATIONS  (PRN):  acetaminophen    Suspension 650 milliGRAM(s) Oral every 6 hours PRN For Temp greater than 38 C (100.4 F)  hydrALAZINE Injectable 20 milliGRAM(s) IV Push every 6 hours PRN systolic > 150  lactulose Syrup 20 Gram(s) Oral every 6 hours PRN Constpation  polyethylene glycol 3350 17 Gram(s) Oral daily PRN Constipation    Physical Exam  Neuro: alert, non verbal, does not follow commands  Pulm: clear anteriorly, + 7shiley XLT, sutures intact  CV: S1S2 RRR  Abd: + BS soft NT ND, + PEG intact, no erythema or drainage around site  Extrem/MS: no edema, WWP  + bush   I

## 2018-05-11 NOTE — PROGRESS NOTE ADULT - PROBLEM SELECTOR PLAN 1
s/p trach/Peg 5/11  surgicel removed today  trach and PEG site intact  vent weaning and medical management per primary team  d/w Dr. Gonzalez will sign off

## 2018-05-11 NOTE — PROGRESS NOTE ADULT - ASSESSMENT
63 y.o female with PMH of alcoholism s/p pontine bleed currently intubated since 4/23 with multiple unsuccessful attempts to wean from ventilator, now s/p trach/PEG 5/10 with Dr. Gonzalez

## 2018-05-11 NOTE — PROGRESS NOTE ADULT - SUBJECTIVE AND OBJECTIVE BOX
[facility]                                        Neurology at [place]                                 Rima Whiteside, & Saad                                  [address]                                        [place], [place], [address]                                             [phone]        CC: Pontine intracranial hemorrhage.    HPI:   63y Female who c/o dizziness and then collapsed at home.  She had poor mental status and was intubated in the ED. There was no associated seizure activity.   She was found to have a large brainstem intracranial hemorrhage on CT.     She has remained on mechanical ventilator since admission.     Interim history:  She is now status post tracheostomy and PEG.   She has remained on mechanical ventilator since yesterday and remains poorly responsive.    ROS:   Unobtainable due to patient's condition.     MEDICATIONS  (STANDING):  amLODIPine   Tablet 10 milliGRAM(s) Oral daily  bisacodyl Suppository 10 milliGRAM(s) Rectal daily  cephalexin 500 milliGRAM(s) Oral four times a day  chlorhexidine 0.12% Liquid 15 milliLiter(s) Swish and Spit two times a day  enoxaparin Injectable 40 milliGRAM(s) SubCutaneous daily  folic acid 1 milliGRAM(s) Oral daily  labetalol 200 milliGRAM(s) Oral every 8 hours  multiple electrolytes Injection Type 1 1000 milliLiter(s) (125 mL/Hr) IV Continuous <Continuous>  pantoprazole   Suspension 40 milliGRAM(s) Oral before lunch  petrolatum Ophthalmic Ointment 1 Application(s) Both EYES every 6 hours  senna 2 Tablet(s) Oral at bedtime  thiamine 100 milliGRAM(s) Oral daily      Vital Signs Last 24 Hrs  T(F): 99.5 (11 May 2018 09:00), Max: 101.5 (10 May 2018 20:00)  HR: 75 (11 May 2018 09:00) (72 - 83)  BP: 112/58 (11 May 2018 09:00) (102/56 - 166/82)  BP(mean): 82 (11 May 2018 09:00) (73 - 114)  RR: 17 (11 May 2018 09:00) (12 - 73)  SpO2: 100% (11 May 2018 09:00) (95% - 100%)    Detailed Neurologic Exam:    Mental status: Eyes open,  but not responding to voice. Not following any instructions.  Not tracking with gaze today.     Cranial nerves: Pupils pinpoint. There is slight blink to threat. There is roving eye movement in the vertical plane. Corneal reflexes absent. Palate and tongue cannot be assessed.     Motor/Sensory:  There is normal bulk and tone.  There is no tremor.  There is minimal extensor posturing bilaterally to stimuli.  No purposeful movement (including fingers on left).    Reflexes: Trace throughout and plantar responses are extensor bilaterally.    Cerebellar: Cannot be tested.    Labs:     05-11    140  |  99  |  20.0  ----------------------------<  118<H>  4.0   |  29.0  |  0.49<L>    Ca    9.3      11 May 2018 07:04  Phos  3.5     05-11  Mg     2.3     05-11    TPro  7.3  /  Alb  2.7<L>  /  TBili  <0.2<L>  /  DBili  x   /  AST  28  /  ALT  41<H>  /  AlkPhos  116  05-10                            8.4    11.4  )-----------( 449      ( 11 May 2018 07:04 )             26.8

## 2018-05-11 NOTE — PROGRESS NOTE ADULT - PROBLEM SELECTOR PLAN 1
s/p tracheostomy. no longer requires MICU admission  continue VAP precautions with chlorhexidine  continue stress ulcer prophylaxis with protonix  transfer to [address] [place] [address] for vent monitored bed s/p tracheostomy. no longer requires MICU admission  continue VAP precautions with chlorhexidine  continue stress ulcer prophylaxis with protonix  transfer to [address] [place] [address] for vent monitored bed  CPAP as tolerated and wean FiO2 to SaO2 >90%

## 2018-05-11 NOTE — PROGRESS NOTE ADULT - SUBJECTIVE AND OBJECTIVE BOX
Patient is a 63y old  Female who presents with a chief complaint of complained of dizzyness and collapsed at home (23 Apr 2018 10:59)      BRIEF HOSPITAL COURSE: 64 yo female, PMHx EtOH abuse, admitted on 4/23/18 with dizziness and syncope. Upon arrival in the ED she was obtunded and intubated for airway support. Her BP was noted to be elevated. 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 There was no evidence of aneurysm or dissection noted on CTA. IV Cardene initiated for BP control. Poor neurological exam. Neurosurgery consulted, no neurosurgical intervention to be of benefit. [facility].    Events last 24 hours: s/p trach and PEG today. On full vent support, FiO2 weaned to 60%.    PAST MEDICAL & SURGICAL HISTORY:  Alcohol abuse  No significant past surgical history      Review of Systems: Due to altered mental status, subjective information was not able to be obtained from the patient. History was obtained, to the extent possible, from review of the chart and collateral sources of information.      Medications:  cephalexin 500 milliGRAM(s) Oral four times a day  amLODIPine   Tablet 10 milliGRAM(s) Oral daily  hydrALAZINE Injectable 20 milliGRAM(s) IV Push every 6 hours PRN  labetalol 200 milliGRAM(s) Oral every 8 hours  labetalol Injectable 20 milliGRAM(s) IV Push every 2 hours PRN  acetaminophen    Suspension 650 milliGRAM(s) Oral every 6 hours PRN  enoxaparin Injectable 40 milliGRAM(s) SubCutaneous daily  bisacodyl Suppository 10 milliGRAM(s) Rectal daily  lactulose Syrup 20 Gram(s) Oral every 6 hours PRN  pantoprazole   Suspension 40 milliGRAM(s) Oral before lunch  polyethylene glycol 3350 17 Gram(s) Oral daily PRN  senna 2 Tablet(s) Oral at bedtime  folic acid 1 milliGRAM(s) Oral daily  multiple electrolytes Injection Type 1 1000 milliLiter(s) IV Continuous <Continuous>  thiamine 100 milliGRAM(s) Oral daily  chlorhexidine 0.12% Liquid 15 milliLiter(s) Swish and Spit two times a day  petrolatum Ophthalmic Ointment 1 Application(s) Both EYES every 6 hours      Mode: AC/ CMV (Assist Control/ Continuous Mandatory Ventilation)  RR (machine): 12  TV (machine): 500  FiO2: 50  PEEP: 8  MAP: 12  PIP: 25      ICU Vital Signs Last 24 Hrs  T(C): 37.4 (11 May 2018 01:00), Max: 38.6 (10 May 2018 20:00)  T(F): 99.3 (11 May 2018 01:00), Max: 101.5 (10 May 2018 20:00)  HR: 75 (11 May 2018 01:00) (72 - 97)  BP: 133/76 (11 May 2018 01:00) (100/59 - 157/85)  BP(mean): 99 (11 May 2018 01:00) (73 - 114)  ABP: --  ABP(mean): --  RR: 17 (11 May 2018 01:00) (12 - 42)  SpO2: 100% (11 May 2018 01:00) (95% - 100%)        I&O's Detail    09 May 2018 07:01  -  10 May 2018 07:00  --------------------------------------------------------  IN:    Enteral Tube Flush: 400 mL    Jevity: 720 mL  Total IN: 1120 mL    OUT:    Indwelling Catheter - Urethral: 1215 mL  Total OUT: 1215 mL    Total NET: -95 mL      10 May 2018 07:01  -  11 May 2018 02:27  --------------------------------------------------------  IN:    multiple electrolytes Injection Type 1: 1000 mL  Total IN: 1000 mL    OUT:    Indwelling Catheter - Urethral: 1150 mL  Total OUT: 1150 mL    Total NET: -150 mL            LABS:                        9.0    12.4  )-----------( 511      ( 10 May 2018 04:00 )             29.2     05-10    142  |  100  |  17.0  ----------------------------<  112  4.3   |  33.0<H>  |  0.53    Ca    9.6      10 May 2018 04:00  Phos  3.2     05-10  Mg     2.2     05-10    TPro  7.3  /  Alb  2.7<L>  /  TBili  <0.2<L>  /  DBili  x   /  AST  28  /  ALT  41<H>  /  AlkPhos  116  05-10          CAPILLARY BLOOD GLUCOSE        PT/INR - ( 10 May 2018 04:00 )   PT: 11.4 sec;   INR: 1.04 ratio         PTT - ( 10 May 2018 04:00 )  PTT:28.0 sec    CULTURES:  Culture Results:   Numerous Staphylococcus aureus  No Routine respiratory ruddy present (05-04 @ 04:32)      Physical Examination:    General: No acute distress.      HEENT: Pupils 2mm equal, reactive to light, sluggish. Symmetric.    PULM: Intubated. Coarse to auscultation bilaterally    CVS: Regular rate and rhythm, no murmurs, rubs, or gallops    ABD: Soft, nondistended    EXT: Generalized anasarca    NEURO: Vertical nystagmus. Minimally reactive 2mm pupils, eyes open spontaneously, no gag, +overbreathing vent on PS, does not follow commands or make purposeful movements.    RADIOLOGY: old images reviewed    INVASIVE LINES:   INDWELLING MARTINEZ: +  VTE PROPHYLAXIS: Lovenox, SCDs  CAM ICU: +  CODE STATUS: FULL    CRITICAL CARE TIME SPENT: 30 minutes assessing presenting problems of acute illness, which pose high probability of life threatening deterioration or end organ damage/dysfunction, as well as medical decision making including initiating plan of care, reviewing data, reviewing radiologic exams, discussing with multidisciplinary team, non-inclusive of procedures performed, discussing goals of care with patient/family

## 2018-05-11 NOTE — PROGRESS NOTE ADULT - PROBLEM SELECTOR PLAN 5
controlled on current regimen  BP stable on labetalol and amlodipine with labetalol and hydralazine PRN

## 2018-05-11 NOTE — CONSULT NOTE ADULT - SUBJECTIVE AND OBJECTIVE BOX
[place] [facility] - [facility], THE HEART [facility]ENTER                                   [address] [place] [address]                                                      PHONE: [phone]                                                         FAX: [phone]  [email or web address]  ---------------------------------------------------------------------------------------------------------------------------------    Reason for Consult: Bradycardia  HPI:  FAUSTO BARRETO is an 63y Female PMHx ETOH abuse presenting with dizziness and LOC 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 Today had an transient episode of bradycardia ~1hr after labetalol was given.  Her heart rate has recovered since then and labetalol dc'd    PAST MEDICAL & SURGICAL HISTORY:  Alcohol abuse  No significant past surgical history      No Known Allergies      MEDICATIONS  (STANDING):  amLODIPine   Tablet 10 milliGRAM(s) Oral daily  atorvastatin 40 milliGRAM(s) Oral at bedtime  bisacodyl Suppository 10 milliGRAM(s) Rectal daily  cephalexin 500 milliGRAM(s) Oral four times a day  chlorhexidine 0.12% Liquid 15 milliLiter(s) Swish and Spit two times a day  enoxaparin Injectable 40 milliGRAM(s) SubCutaneous daily  folic acid 1 milliGRAM(s) Oral daily  lisinopril 5 milliGRAM(s) Oral daily  pantoprazole   Suspension 40 milliGRAM(s) Oral before lunch  petrolatum Ophthalmic Ointment 1 Application(s) Both EYES every 6 hours  senna 2 Tablet(s) Oral at bedtime  thiamine 100 milliGRAM(s) Oral daily    MEDICATIONS  (PRN):  acetaminophen    Suspension 650 milliGRAM(s) Oral every 6 hours PRN For Temp greater than 38 C (100.4 F)  hydrALAZINE Injectable 20 milliGRAM(s) IV Push every 6 hours PRN systolic > 150  lactulose Syrup 20 Gram(s) Oral every 6 hours PRN Constpation  polyethylene glycol 3350 17 Gram(s) Oral daily PRN Constipation      Social History:  Cigarettes:    no                Alchohol:    yes             Illicit Drug Abuse:  no  FHx NC  ROS: Negative other than as mentioned in HPI.    Vital Signs Last 24 Hrs  T(C): 37.7 (11 May 2018 17:00), Max: 38.6 (10 May 2018 20:00)  T(F): 99.9 (11 May 2018 17:00), Max: 101.5 (10 May 2018 20:00)  HR: 75 (11 May 2018 17:00) (24 - 83)  BP: 100/55 (11 May 2018 17:00) (100/55 - 166/82)  BP(mean): 70 (11 May 2018 17:00) (70 - 114)  RR: 12 (11 May 2018 17:00) (12 - 73)  SpO2: 100% (11 May 2018 17:00) (98% - 100%)  ICU Vital Signs Last 24 Hrs  FAUSTO BARRETO  I&O's Detail    10 May 2018 07:01  -  11 May 2018 07:00  --------------------------------------------------------  IN:    multiple electrolytes Injection Type 1multiple electrolytes Injection Type 1: 2000 mL  Total IN: 2000 mL    OUT:    Indwelling Catheter - Urethral: 1685 mL  Total OUT: 1685 mL    Total NET: 315 mL      11 May 2018 07:01  -  11 May 2018 17:25  --------------------------------------------------------  IN:    Enteral Tube Flush: 120 mL    Enteral Tube Flush: 400 mL    Jevity: 90 mL    multiple electrolytes Injection Type 1multiple electrolytes Injection Type 1: 1125 mL  Total IN: 1735 mL    OUT:    Indwelling Catheter - Urethral: 430 mL  Total OUT: 430 mL    Total NET: 1305 mL        I&O's Summary    10 May 2018 07:01  -  11 May 2018 07:00  --------------------------------------------------------  IN: 2000 mL / OUT: 1685 mL / NET: 315 mL    11 May 2018 07:01  -  11 May 2018 17:25  --------------------------------------------------------  IN: 1735 mL / OUT: 430 mL / NET: 1305 mL      Drug Dosing Weight  FAUSTO BARRETO  Mode: CPAP with PS, FiO2: 40, PEEP: 8, PS: 10, MAP: 12    PHYSICAL EXAM:  General:poor response to stimuli  HEENT: Head; normocephalic, atraumatic.  Eyes: Pupils reactive, cornea wnl.  Neck: Supple, no nodes adenopathy, no NVD or carotid bruit or thyromegaly.  CARDIOVASCULAR: Normal S1 and S2, No murmur, rub, gallop or lift.   LUNGS: No rales, rhonchi or wheeze. Normal breath sounds bilaterally.  ABDOMEN: Soft, nontender without mass or organomegaly. bowel sounds normoactive.  EXTREMITIES: No clubbing, cyanosis or edema. Distal pulses wnl.   SKIN: warm and dry with normal turgor.  NEURO:unable to assess  PSYCH: normal affect.        LABS:                        8.4    11.4  )-----------( 449      ( 11 May 2018 07:04 )             26.8     05-11    140  |  99  |  20.0  ----------------------------<  118<H>  4.0   |  29.0  |  0.49<L>    Ca    9.3      11 May 2018 07:04  Phos  3.5     05-11  Mg     2.3     05-11    TPro  7.3  /  Alb  2.7<L>  /  TBili  <0.2<L>  /  DBili  x   /  AST  28  /  ALT  41<H>  /  AlkPhos  116  05-10    FAUSTO BARRETO      PT/INR - ( 10 May 2018 04:00 )   PT: 11.4 sec;   INR: 1.04 ratio         PTT - ( 10 May 2018 04:00 )  PTT:28.0 sec      RADIOLOGY & ADDITIONAL STUDIES:    INTERPRETATION OF TELEMETRY (personally reviewed): gina and transient pause    ECG: NS @ 95 lateral T wave inversions likely related to intercranial process    Assessment and Plan:  In summary, FAUSTO BARRETO is an 63y Female with past medical history significant for  ETOH abuse presenting with dizziness and LOC 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 Today had an transient episode of bradycardia ~1hr after labetalol was given.  Her heart rate has recovered since then and labetalol dc'd    1) Patient with normal conduction on EKG, brief pause/bradycardia likely related to labetalol. No indication for PPM at this time. Agree with DC labetalol.    2) Will follow

## 2018-05-11 NOTE — PROGRESS NOTE ADULT - ASSESSMENT
62 yo female, admitted to MICU with hypertensive emergency, pontine hemorrhage, respiratory failure, requiring Cardene drip and now on bp meds. Patient is s.p trach and peg     Problem/Plan - 1:  ·  Problem: ICH (intracerebral hemorrhage).  with pontine bleed  --> neuro following  --> no neurosurgical intervention  --> no anti platelet meds as per neuro  --> will start lipitor in peg  --> will order hemoglobin a1c and tsh for am      Problem/Plan - 2:  ·  Problem: Acute respiratory failure.  Plan: s/p trach and PEG 5/10/18  Wean FiO2 as tolerated, with trials of vent weaning once trach matures  Peridex oral care and PPI for GI ppx.   -->: start peg feeds in afternoon      Problem/Plan - 3:  ·  Problem: Encephalopathy acute.  Plan: Likely related to ICH  s/p trach and PEG     Problem/Plan - 4:  ·  Problem: Essential hypertension.  Plan: BP controlled on current regimen.      Problem/Plan - 5:  ·  Problem: Hypernatremia.  Plan:resolved     Problem/Plan - 6:  Problem: Tracheitis. Plan: MSSA on Keflex x 7 days.    7. alcohol abuse --> c.w thiamine, folic acid     8. htn emergency --> c,w po meds    9) anemia --> will order type and screen for am  --> iron studies for am  --> may need transfusions    full code  palliative following

## 2018-05-12 LAB
ANION GAP SERPL CALC-SCNC: 8 MMOL/L — SIGNIFICANT CHANGE UP (ref 5–17)
BLD GP AB SCN SERPL QL: SIGNIFICANT CHANGE UP
BUN SERPL-MCNC: 18 MG/DL — SIGNIFICANT CHANGE UP (ref 8–20)
CALCIUM SERPL-MCNC: 9.1 MG/DL — SIGNIFICANT CHANGE UP (ref 8.6–10.2)
CHLORIDE SERPL-SCNC: 100 MMOL/L — SIGNIFICANT CHANGE UP (ref 98–107)
CO2 SERPL-SCNC: 31 MMOL/L — HIGH (ref 22–29)
CREAT SERPL-MCNC: 0.47 MG/DL — LOW (ref 0.5–1.3)
FERRITIN SERPL-MCNC: 411 NG/ML — HIGH (ref 15–150)
GLUCOSE SERPL-MCNC: 136 MG/DL — HIGH (ref 70–115)
HBA1C BLD-MCNC: 5.7 % — HIGH (ref 4–5.6)
HCT VFR BLD CALC: 25.2 % — LOW (ref 37–47)
HGB BLD-MCNC: 7.8 G/DL — LOW (ref 12–16)
IRON SATN MFR SERPL: 10 % — LOW (ref 14–50)
IRON SATN MFR SERPL: 21 UG/DL — LOW (ref 37–145)
MCHC RBC-ENTMCNC: 30.5 PG — SIGNIFICANT CHANGE UP (ref 27–31)
MCHC RBC-ENTMCNC: 31 G/DL — LOW (ref 32–36)
MCV RBC AUTO: 98.4 FL — SIGNIFICANT CHANGE UP (ref 81–99)
PLATELET # BLD AUTO: 463 K/UL — HIGH (ref 150–400)
POTASSIUM SERPL-MCNC: 3.8 MMOL/L — SIGNIFICANT CHANGE UP (ref 3.5–5.3)
POTASSIUM SERPL-SCNC: 3.8 MMOL/L — SIGNIFICANT CHANGE UP (ref 3.5–5.3)
RBC # BLD: 2.56 M/UL — LOW (ref 4.4–5.2)
RBC # BLD: 2.56 M/UL — LOW (ref 4.4–5.2)
RBC # FLD: 14.3 % — SIGNIFICANT CHANGE UP (ref 11–15.6)
RETICS #: 5.5 K/UL — LOW (ref 25–125)
RETICS/RBC NFR: 2.1 % — SIGNIFICANT CHANGE UP (ref 0.5–2.5)
SODIUM SERPL-SCNC: 139 MMOL/L — SIGNIFICANT CHANGE UP (ref 135–145)
TIBC SERPL-MCNC: 216 UG/DL — LOW (ref 220–430)
TRANSFERRIN SERPL-MCNC: 151 MG/DL — LOW (ref 192–382)
TSH SERPL-MCNC: 1.06 UIU/ML — SIGNIFICANT CHANGE UP (ref 0.27–4.2)
TYPE + AB SCN PNL BLD: SIGNIFICANT CHANGE UP
WBC # BLD: 8.9 K/UL — SIGNIFICANT CHANGE UP (ref 4.8–10.8)
WBC # FLD AUTO: 8.9 K/UL — SIGNIFICANT CHANGE UP (ref 4.8–10.8)

## 2018-05-12 PROCEDURE — 99232 SBSQ HOSP IP/OBS MODERATE 35: CPT

## 2018-05-12 PROCEDURE — 71045 X-RAY EXAM CHEST 1 VIEW: CPT | Mod: 26

## 2018-05-12 PROCEDURE — 99233 SBSQ HOSP IP/OBS HIGH 50: CPT

## 2018-05-12 PROCEDURE — 99223 1ST HOSP IP/OBS HIGH 75: CPT

## 2018-05-12 RX ORDER — IRON SUCROSE 20 MG/ML
200 INJECTION, SOLUTION INTRAVENOUS EVERY 24 HOURS
Qty: 0 | Refills: 0 | Status: COMPLETED | OUTPATIENT
Start: 2018-05-12 | End: 2018-05-16

## 2018-05-12 RX ADMIN — CHLORHEXIDINE GLUCONATE 15 MILLILITER(S): 213 SOLUTION TOPICAL at 17:42

## 2018-05-12 RX ADMIN — Medication 1 APPLICATION(S): at 12:00

## 2018-05-12 RX ADMIN — Medication 1 MILLIGRAM(S): at 13:04

## 2018-05-12 RX ADMIN — ENOXAPARIN SODIUM 40 MILLIGRAM(S): 100 INJECTION SUBCUTANEOUS at 13:04

## 2018-05-12 RX ADMIN — SENNA PLUS 2 TABLET(S): 8.6 TABLET ORAL at 22:05

## 2018-05-12 RX ADMIN — Medication 1 APPLICATION(S): at 17:41

## 2018-05-12 RX ADMIN — IRON SUCROSE 110 MILLIGRAM(S): 20 INJECTION, SOLUTION INTRAVENOUS at 11:00

## 2018-05-12 RX ADMIN — Medication 100 MILLIGRAM(S): at 13:05

## 2018-05-12 RX ADMIN — CHLORHEXIDINE GLUCONATE 15 MILLILITER(S): 213 SOLUTION TOPICAL at 05:32

## 2018-05-12 RX ADMIN — LISINOPRIL 5 MILLIGRAM(S): 2.5 TABLET ORAL at 05:32

## 2018-05-12 RX ADMIN — AMLODIPINE BESYLATE 10 MILLIGRAM(S): 2.5 TABLET ORAL at 05:31

## 2018-05-12 RX ADMIN — ATORVASTATIN CALCIUM 40 MILLIGRAM(S): 80 TABLET, FILM COATED ORAL at 22:05

## 2018-05-12 RX ADMIN — Medication 500 MILLIGRAM(S): at 05:32

## 2018-05-12 RX ADMIN — PANTOPRAZOLE SODIUM 40 MILLIGRAM(S): 20 TABLET, DELAYED RELEASE ORAL at 13:04

## 2018-05-12 RX ADMIN — Medication 10 MILLIGRAM(S): at 13:04

## 2018-05-12 RX ADMIN — Medication 500 MILLIGRAM(S): at 13:04

## 2018-05-12 RX ADMIN — Medication 500 MILLIGRAM(S): at 17:42

## 2018-05-12 RX ADMIN — Medication 1 APPLICATION(S): at 05:32

## 2018-05-12 NOTE — CONSULT NOTE ADULT - ASSESSMENT
-Vent dep resp failure; required mechanical vent due to pontine stroke; currently undergoing PSV/CPAP trials  -Pontine hem  -Temp; since May 6 has been having various degrees of fever. CXR on 5/7 no pna.     RECC:    Continue CPAP trials. Poor weaning prognosis. Consider ID consult for fevers. CXR repeat. DVT prophylaxis.     Overall prognosis very poor.

## 2018-05-12 NOTE — PROGRESS NOTE ADULT - ASSESSMENT
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     1) ICH ( intracranial hemorrhage )  Avoid antiplatelet.   Continue blood pressure control.   Supportive care/vent management.  Overall prognosis remains poor.   Ramos is s/p tracheostomy/PEG.    2) Altered mental status  Secondary to intracranial hemorrhage.     3) Hypertension  Continue blood pressure control.     4) will eventually need LTC in vent capable facility vs withdrawal of ventilatory support      will follow with you    Anirudh Abarca MD PhD   [id number]

## 2018-05-12 NOTE — PROGRESS NOTE ADULT - SUBJECTIVE AND OBJECTIVE BOX
[facility]                                        Neu[facility] [place]                                 Rima Whiteside, & Saad                                  [address]                                        [place], [place], [address]                                             [phone]        CC: Pontine intracranial hemorrhage.  HPI:  63y Female who c/o dizziness and then collapsed at home.  She had poor mental status and was intubated in the ED. There was no associated seizure activity.   She was found to have a large brainstem intracranial hemorrhage on CT.     Interim history:  She is now status post tracheostomy and PEG.   She does not follow ant requests    ROS:   Unobtainable due to patient's condition.     MEDICATIONS  (STANDING):  amLODIPine   Tablet 10 milliGRAM(s) Oral daily  atorvastatin 40 milliGRAM(s) Oral at bedtime  bisacodyl Suppository 10 milliGRAM(s) Rectal daily  cephalexin 500 milliGRAM(s) Oral four times a day  chlorhexidine 0.12% Liquid 15 milliLiter(s) Swish and Spit two times a day  enoxaparin Injectable 40 milliGRAM(s) SubCutaneous daily  folic acid 1 milliGRAM(s) Oral daily  iron sucrose IVPB 200 milliGRAM(s) IV Intermittent every 24 hours  lisinopril 5 milliGRAM(s) Oral daily  pantoprazole   Suspension 40 milliGRAM(s) Oral before lunch  petrolatum Ophthalmic Ointment 1 Application(s) Both EYES every 6 hours  senna 2 Tablet(s) Oral at bedtime  thiamine 100 milliGRAM(s) Oral daily    MEDICATIONS  (PRN):  acetaminophen    Suspension 650 milliGRAM(s) Oral every 6 hours PRN For Temp greater than 38 C (100.4 F)  hydrALAZINE Injectable 20 milliGRAM(s) IV Push every 6 hours PRN systolic > 150  lactulose Syrup 20 Gram(s) Oral every 6 hours PRN Constpation  polyethylene glycol 3350 17 Gram(s) Oral daily PRN Constipation    Vital Signs Last 24 Hrs  T(C): 37.8 (12 May 2018 13:00), Max: 38 (11 May 2018 22:00)  T(F): 100 (12 May 2018 13:00), Max: 100.4 (11 May 2018 22:00)  HR: 87 (12 May 2018 13:00) (24 - 90)  BP: 121/68 (12 May 2018 13:00) (100/55 - 138/59)  BP(mean): 87 (12 May 2018 13:00) (70 - 96)  RR: 37 (12 May 2018 13:00) (11 - 39)  SpO2: 100% (12 May 2018 13:00) (100% - 100%)    Detailed Neurologic Exam:    Mental status: Eyes not open,  not responding to voice. Not following any instructions.  No tracking with gaze today.     Cranial nerves: Pupils pinpoint. There is no blink to threat. There is roving eye movement in the vertical plane. Corneal reflexes absent. shoulder shrug, palate and tongue cannot be assessed.     Motor/Sensory:  There is normal bulk and tone.  There is no tremor.  There is minimal extensor posturing bilaterally to stimuli.  No purposeful movement (including fingers on left).    Reflexes: Trace throughout and plantar responses are extensor bilaterally.    Cerebellar: Cannot be tested.    Labs:                           7.8    8.9   )-----------( 463      ( 12 May 2018 07:08 )             25.2     05-12    139  |  100  |  18.0  ----------------------------<  136<H>  3.8   |  31.0<H>  |  0.47<L>    Ca    9.1      12 May 2018 07:08  Phos  3.5     05-11  Mg     2.3     05-11    Recent neurological studies: none

## 2018-05-12 NOTE — PROGRESS NOTE ADULT - ASSESSMENT
64 yo female, admitted to MICU with hypertensive emergency, pontine hemorrhage, respiratory failure, requiring Cardene drip and now on bp meds. Patient is s.p trach and peg     Problem/Plan - 1:  ·  Problem: ICH (intracerebral hemorrhage).  with pontine bleed  --> neuro following  --> no neurosurgical intervention  --> no anti platelet meds as per neuro  --> c/w lipitor   --> a1c and tsh within normal limits      Problem/Plan - 2:  ·  Problem: Acute respiratory failure.  Plan: s/p trach and PEG 5/10/18  Wean FiO2 as tolerated, with trials of vent weaning once trach matures  Peridex oral care and PPI for GI ppx.   --> consulted pulmonary      Problem/Plan - 3:  ·  Problem: Encephalopathy acute.  Plan: Likely related to ICH  s/p trach and PEG  --> supportive care      Problem/Plan - 4:  ·  Problem: Essential hypertension.  Plan: BP controlled on current regimen.      Problem/Plan - 5:  ·  Problem: Hypernatremia.  Plan:resolved     Problem/Plan - 6:  Problem: Tracheitis. Plan: MSSA on Keflex x 7 days as per icu     7. alcohol abuse --> c.w thiamine, folic acid     8. htn emergency --> c,w po meds  --> resolved    9) iron def anemia -->1 unit prbc today   --> start iv iron for 5 days     10) fever --> central fevers vs trachitiis  --> continue current management     full code  palliative following     prognosis poor

## 2018-05-12 NOTE — PROGRESS NOTE ADULT - SUBJECTIVE AND OBJECTIVE BOX
FAUSTO BARRETO    [id number]    63y      Female    INTERVAL HPI/OVERNIGHT EVENTS:      off service note:  Patient is a 62F with a history of alcoholism, who presented with dizziness and then collapsed at home. Upon arrival in the ER she was obtunded and intubated for airway support. He BP was noted to be elevated. 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 They was no evidence of aneurysm or dissection noted on CTA. Her BP was controlled on IV Cardene. Neurologically, she was unresponsive, her pupils were small and nonreactive, there was a gag reflex, and she withdrew her legs and left hand to pain.    Neurosurgery was consulted and confirmed there was no surgical or non-invasive intervention which could be offered.  Patient also seen by neuro and palliative in consult    Patient on 5/10  PEG (percutaneous endoscopic gastrostomy)  05/10/2018    Active  TBURNS2  Tracheostomy tube placement  05/10/2018    Active  TBURNS2.    Patient started on keflex for trachtis and was downgraded to medicine on 5/11. Patient seen at bedside and is making random movements but no purposeful movements.     tmax 100.4    as per nurse yesterday patient had bradycardia and a possible pause on tele and labetalol was held     REVIEW OF SYSTEMS:    unabel to obtain secondary to mental status     Vital Signs Last 24 Hrs  T(C): 37.8 (12 May 2018 13:00), Max: 38 (11 May 2018 22:00)  T(F): 100 (12 May 2018 13:00), Max: 100.4 (11 May 2018 22:00)  HR: 87 (12 May 2018 13:00) (24 - 90)  BP: 121/68 (12 May 2018 13:00) (100/55 - 142/69)  BP(mean): 87 (12 May 2018 13:00) (70 - 97)  RR: 37 (12 May 2018 13:00) (11 - 39)  SpO2: 100% (12 May 2018 13:00) (99% - 100%)    PHYSICAL EXAM:    GENERAL: anxious, awake but not alert   NECK: trach   CHEST/LUNG: coarse breath sounds  HEART: S1S2+, Regular rate and rhythm; No murmurs  ABDOMEN: Soft, peg  EXTREMITIES:  no edema   SKIN: No rashes or lesions  NEURO: awake. withdraws from pain , random eye movements       LABS:                        7.8    8.9   )-----------( 463      ( 12 May 2018 07:08 )             25.2     05-12    139  |  100  |  18.0  ----------------------------<  136<H>  3.8   |  31.0<H>  |  0.47<L>    Ca    9.1      12 May 2018 07:08  Phos  3.5     05-11  Mg     2.3     05-11      MEDICATIONS  (STANDING):  amLODIPine   Tablet 10 milliGRAM(s) Oral daily  atorvastatin 40 milliGRAM(s) Oral at bedtime  bisacodyl Suppository 10 milliGRAM(s) Rectal daily  cephalexin 500 milliGRAM(s) Oral four times a day  chlorhexidine 0.12% Liquid 15 milliLiter(s) Swish and Spit two times a day  enoxaparin Injectable 40 milliGRAM(s) SubCutaneous daily  folic acid 1 milliGRAM(s) Oral daily  iron sucrose IVPB 200 milliGRAM(s) IV Intermittent every 24 hours  lisinopril 5 milliGRAM(s) Oral daily  pantoprazole   Suspension 40 milliGRAM(s) Oral before lunch  petrolatum Ophthalmic Ointment 1 Application(s) Both EYES every 6 hours  senna 2 Tablet(s) Oral at bedtime  thiamine 100 milliGRAM(s) Oral daily    MEDICATIONS  (PRN):  acetaminophen    Suspension 650 milliGRAM(s) Oral every 6 hours PRN For Temp greater than 38 C (100.4 F)  hydrALAZINE Injectable 20 milliGRAM(s) IV Push every 6 hours PRN systolic > 150  lactulose Syrup 20 Gram(s) Oral every 6 hours PRN Constpation  polyethylene glycol 3350 17 Gram(s) Oral daily PRN Constipation      RADIOLOGY & ADDITIONAL TESTS:

## 2018-05-12 NOTE — CONSULT NOTE ADULT - SUBJECTIVE AND OBJECTIVE BOX
PULMONARY CONSULT NOTE      MALVIN BARRETO-[id number]    Patient is a 63y old  Female who presents with a chief complaint of complained of dizzyness and collapsed at home (23 Apr 2018 10:59)      HISTORY OF PRESENT ILLNESS: Hx from chart; pt unable to provide hx. Patient is a 62F with a history of alcoholism, who presented with dizziness and then collapsed at home. Upon arrival in the ER she was obtunded and intubated for airway support. He BP was noted to be elevated. 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 They was no evidence of aneurysm or dissection noted on CTA. Her BP was controlled on IV Cardene. Neurologically, she was unresponsive, her pupils were small and nonreactive, there was a gag reflex, and she withdrew her legs and left hand to pain. Neurosurgery was consulted and confirmed there was no surgical or non-invasive intervention which could be offered.  Patient also seen by neuro and palliative in consult. Underwent trach and PEG on 5/10. Started on keflex for tracheitis. Currently on CPAP/PSV. Downgraded from ICU service.               MEDICATIONS  (STANDING):  amLODIPine   Tablet 10 milliGRAM(s) Oral daily  atorvastatin 40 milliGRAM(s) Oral at bedtime  bisacodyl Suppository 10 milliGRAM(s) Rectal daily  cephalexin 500 milliGRAM(s) Oral four times a day  chlorhexidine 0.12% Liquid 15 milliLiter(s) Swish and Spit two times a day  enoxaparin Injectable 40 milliGRAM(s) SubCutaneous daily  folic acid 1 milliGRAM(s) Oral daily  iron sucrose IVPB 200 milliGRAM(s) IV Intermittent every 24 hours  lisinopril 5 milliGRAM(s) Oral daily  pantoprazole   Suspension 40 milliGRAM(s) Oral before lunch  petrolatum Ophthalmic Ointment 1 Application(s) Both EYES every 6 hours  senna 2 Tablet(s) Oral at bedtime  thiamine 100 milliGRAM(s) Oral daily      MEDICATIONS  (PRN):  acetaminophen    Suspension 650 milliGRAM(s) Oral every 6 hours PRN For Temp greater than 38 C (100.4 F)  hydrALAZINE Injectable 20 milliGRAM(s) IV Push every 6 hours PRN systolic > 150  lactulose Syrup 20 Gram(s) Oral every 6 hours PRN Constpation  polyethylene glycol 3350 17 Gram(s) Oral daily PRN Constipation      Allergies    No Known Allergies    Intolerances        PAST MEDICAL & SURGICAL HISTORY:  Alcohol abuse  No significant past surgical history      FAMILY HISTORY:  No pertinent family history in first degree relatives      SOCIAL HISTORY  Smoking History: unknown    REVIEW OF SYSTEMS:    unavailable    Vital Signs Last 24 Hrs  T(C): 37.8 (12 May 2018 13:00), Max: 38 (11 May 2018 22:00)  T(F): 100 (12 May 2018 13:00), Max: 100.4 (11 May 2018 22:00)  HR: 87 (12 May 2018 13:00) (24 - 90)  BP: 121/68 (12 May 2018 13:00) (100/55 - 142/69)  BP(mean): 87 (12 May 2018 13:00) (70 - 97)  RR: 37 (12 May 2018 13:00) (11 - 39)  SpO2: 100% (12 May 2018 13:00) (99% - 100%)    PHYSICAL EXAMINATION:    GENERAL: The patient is a  in no apparent distress, on vent via trach    HEENT:  Mucous membranes are moist.     NECK: trach    LUNGS: Clear to auscultation without wheezing, rales, or rhonchi. Respirations unlabored    HEART: Regular rate and rhythm      ABDOMEN: Soft, nontender, and nondistended.  No hepatosplenomegaly is noted.    EXTREMITIES: Without any cyanosis, clubbing, rash, lesions or edema.    NEUROLOGIC: not following commands, not interactive      LABS:                        7.8    8.9   )-----------( 463      ( 12 May 2018 07:08 )             25.2     05-12    139  |  100  |  18.0  ----------------------------<  136<H>  3.8   |  31.0<H>  |  0.47<L>    Ca    9.1      12 May 2018 07:08  Phos  3.5     05-11  Mg     2.3     05-11    Blood Gas Profile - Arterial (04.25.18 @ 21:09)    pH, Arterial: 7.44    pCO2, Arterial: 44 mmHg    pO2, Arterial: 66 mmHg    HCO3, Arterial: 29 mmoL/L    Base Excess, Arterial: 4.7 mmol/L    Oxygen Saturation, Arterial: 94 %    FIO2, Arterial: .25    Blood Gas Comments Arterial: CPAP +5 PS 10 .25    Blood Gas Source Arterial: Arterial            CARDIAC MARKERS ( 11 May 2018 19:13 )  x     / <0.01 ng/mL / 78 U/L / x     / x             RADIOLOGY & ADDITIONAL STUDIES:  < from: Xray Chest 1 View- PORTABLE-Urgent (05.07.18 @ 10:13) >     EXAM:  XR CHEST PORTABLE URGENT 1V                          PROCEDURE DATE:  05/07/2018          INTERPRETATION:  History: CVA.    Technique:  AP portable    Comparisons:  Chest x-ray dated 5/2/2018    Findings:     Lungs remain clear. There are no infiltrates, congestion or   pleural effusions. ET tube is in good position at the level the aortic   arch. Nasogastric tube is in the stomach.  .    The pulmonary vasculature   and aorta are normal for age. Heart size is unremarkable.     The thorax is normal for age.    Impression: No acute pulmonary disease. Intubated.    No interval change                OSBALDO KHAN M.D., ATTENDING RADIOLOGIST    < end of copied text >  < from: CT Head No Cont (04.29.18 @ 13:44) >     EXAM:  CT BRAIN                          PROCEDURE DATE:  04/29/2018          INTERPRETATION:      CT head without IV contrast        CLINICAL INFORMATION:  Follow-up   Intracranial hemorrhage.    TECHNIQUE: Contiguous axial 5 mm sections were obtained through the head.   Sagittal and coronal 2-D reformatted images were also obtained.   This   scan was performed using automatic exposure control (radiation dose   reduction software) to obtain a diagnostic image quality scan with   patient dose as low as reasonably achievable.     FINDINGS:   CT dated 4/28/2018 available for review.    The brain demonstrates unchanged small pontine hemorrhage.   Mild   periventricular white matter ischemia unchanged. No acute cerebral   cortical infarct is seen.  No mass effect is found in the brain.      The ventricles, sulci and basal cisterns appear unremarkable.         The orbits are unremarkable.  The paranasal sinuses are significant for   mucosal thickening and secretions in the BILATERAL maxillary and ethmoid   sinuses. ET tube is noted.  The nasal cavity appears intact.  The   nasopharynx is symmetric.  The central skull base, petrous temporal bones   and calvarium remain intact.      IMPRESSION:   unchanged small pontine hemorrhage.   Mildperiventricular   white matter ischemia unchanged. mucosal thickening and secretions in the   BILATERAL maxillary and ethmoid sinuses. ET tube is noted.                    THO DELONG M.D., ATTENDING RADIOLOGIST    < end of copied text >

## 2018-05-13 DIAGNOSIS — R50.9 FEVER, UNSPECIFIED: ICD-10-CM

## 2018-05-13 DIAGNOSIS — D64.9 ANEMIA, UNSPECIFIED: ICD-10-CM

## 2018-05-13 DIAGNOSIS — Z29.9 ENCOUNTER FOR PROPHYLACTIC MEASURES, UNSPECIFIED: ICD-10-CM

## 2018-05-13 LAB
ANION GAP SERPL CALC-SCNC: 11 MMOL/L — SIGNIFICANT CHANGE UP (ref 5–17)
BUN SERPL-MCNC: 12 MG/DL — SIGNIFICANT CHANGE UP (ref 8–20)
CALCIUM SERPL-MCNC: 9.2 MG/DL — SIGNIFICANT CHANGE UP (ref 8.6–10.2)
CHLORIDE SERPL-SCNC: 100 MMOL/L — SIGNIFICANT CHANGE UP (ref 98–107)
CO2 SERPL-SCNC: 30 MMOL/L — HIGH (ref 22–29)
CREAT SERPL-MCNC: 0.38 MG/DL — LOW (ref 0.5–1.3)
GLUCOSE SERPL-MCNC: 157 MG/DL — HIGH (ref 70–115)
GRAM STN FLD: SIGNIFICANT CHANGE UP
HCT VFR BLD CALC: 30.8 % — LOW (ref 37–47)
HGB BLD-MCNC: 9.7 G/DL — LOW (ref 12–16)
MAGNESIUM SERPL-MCNC: 2 MG/DL — SIGNIFICANT CHANGE UP (ref 1.6–2.6)
MCHC RBC-ENTMCNC: 30.2 PG — SIGNIFICANT CHANGE UP (ref 27–31)
MCHC RBC-ENTMCNC: 31.5 G/DL — LOW (ref 32–36)
MCV RBC AUTO: 96 FL — SIGNIFICANT CHANGE UP (ref 81–99)
PLATELET # BLD AUTO: 445 K/UL — HIGH (ref 150–400)
POTASSIUM SERPL-MCNC: 3.9 MMOL/L — SIGNIFICANT CHANGE UP (ref 3.5–5.3)
POTASSIUM SERPL-SCNC: 3.9 MMOL/L — SIGNIFICANT CHANGE UP (ref 3.5–5.3)
RBC # BLD: 3.21 M/UL — LOW (ref 4.4–5.2)
RBC # FLD: 15.5 % — SIGNIFICANT CHANGE UP (ref 11–15.6)
SODIUM SERPL-SCNC: 141 MMOL/L — SIGNIFICANT CHANGE UP (ref 135–145)
SPECIMEN SOURCE: SIGNIFICANT CHANGE UP
WBC # BLD: 9.6 K/UL — SIGNIFICANT CHANGE UP (ref 4.8–10.8)
WBC # FLD AUTO: 9.6 K/UL — SIGNIFICANT CHANGE UP (ref 4.8–10.8)

## 2018-05-13 PROCEDURE — 99232 SBSQ HOSP IP/OBS MODERATE 35: CPT

## 2018-05-13 PROCEDURE — 99233 SBSQ HOSP IP/OBS HIGH 50: CPT

## 2018-05-13 RX ADMIN — Medication 10 MILLIGRAM(S): at 12:09

## 2018-05-13 RX ADMIN — Medication 500 MILLIGRAM(S): at 23:40

## 2018-05-13 RX ADMIN — Medication 1 APPLICATION(S): at 12:09

## 2018-05-13 RX ADMIN — Medication 500 MILLIGRAM(S): at 05:36

## 2018-05-13 RX ADMIN — CHLORHEXIDINE GLUCONATE 15 MILLILITER(S): 213 SOLUTION TOPICAL at 18:19

## 2018-05-13 RX ADMIN — Medication 1 APPLICATION(S): at 23:40

## 2018-05-13 RX ADMIN — Medication 1 APPLICATION(S): at 18:20

## 2018-05-13 RX ADMIN — Medication 500 MILLIGRAM(S): at 18:20

## 2018-05-13 RX ADMIN — Medication 500 MILLIGRAM(S): at 01:02

## 2018-05-13 RX ADMIN — Medication 1 APPLICATION(S): at 01:02

## 2018-05-13 RX ADMIN — ATORVASTATIN CALCIUM 40 MILLIGRAM(S): 80 TABLET, FILM COATED ORAL at 21:34

## 2018-05-13 RX ADMIN — Medication 1 MILLIGRAM(S): at 12:09

## 2018-05-13 RX ADMIN — Medication 100 MILLIGRAM(S): at 12:09

## 2018-05-13 RX ADMIN — Medication 500 MILLIGRAM(S): at 13:16

## 2018-05-13 RX ADMIN — CHLORHEXIDINE GLUCONATE 15 MILLILITER(S): 213 SOLUTION TOPICAL at 05:36

## 2018-05-13 RX ADMIN — SENNA PLUS 2 TABLET(S): 8.6 TABLET ORAL at 21:34

## 2018-05-13 RX ADMIN — AMLODIPINE BESYLATE 10 MILLIGRAM(S): 2.5 TABLET ORAL at 05:36

## 2018-05-13 RX ADMIN — IRON SUCROSE 110 MILLIGRAM(S): 20 INJECTION, SOLUTION INTRAVENOUS at 09:00

## 2018-05-13 RX ADMIN — Medication 650 MILLIGRAM(S): at 12:09

## 2018-05-13 RX ADMIN — Medication 650 MILLIGRAM(S): at 21:25

## 2018-05-13 RX ADMIN — Medication 1 APPLICATION(S): at 05:36

## 2018-05-13 RX ADMIN — PANTOPRAZOLE SODIUM 40 MILLIGRAM(S): 20 TABLET, DELAYED RELEASE ORAL at 12:09

## 2018-05-13 RX ADMIN — ENOXAPARIN SODIUM 40 MILLIGRAM(S): 100 INJECTION SUBCUTANEOUS at 12:09

## 2018-05-13 RX ADMIN — LISINOPRIL 5 MILLIGRAM(S): 2.5 TABLET ORAL at 05:36

## 2018-05-13 NOTE — PROGRESS NOTE ADULT - PROBLEM SELECTOR PLAN 1
Tmax 101  Obtain blood and sputum cultures  No leukocytosis  No source of bleeding at this time  Monitor off abx as possible 2/2 pontine hemorrhage

## 2018-05-13 NOTE — PROGRESS NOTE ADULT - SUBJECTIVE AND OBJECTIVE BOX
FAUSTO BARRETO    [id number]    63y      Female    INTERVAL HPI/OVERNIGHT EVENTS: Opens eyes and responds to her name. Shakes her head no when asked if she's in pain, then drifts off to sleep.     Hospital course:  Patient is a 62F with a history of alcoholism, who presented with dizziness and then collapsed at home. Upon arrival in the ER she was obtunded and intubated for airway support. He BP was noted to be elevated. 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 They was no evidence of aneurysm or dissection noted on CTA. Her BP was controlled on IV Cardene. Neurologically, she was unresponsive, her pupils were small and nonreactive, there was a gag reflex, and she withdrew her legs and left hand to pain. Neurosurgery was consulted and confirmed there was no surgical or non-invasive intervention which could be offered.  Patient also seen by neuro and palliative in consult. On 5/10, pt had PEG and trach, then was started on keflex for tracheitis. Pt was downgraded on 5/11 to medicine service.       REVIEW OF SYSTEMS:    Unable to obtain as pt only somewhat responsive.     Vital Signs Last 24 Hrs  T(C): 38.4 (13 May 2018 12:00), Max: 38.4 (13 May 2018 12:00)  T(F): 101.1 (13 May 2018 12:00), Max: 101.1 (13 May 2018 12:00)  HR: 78 (13 May 2018 12:32) (66 - 87)  BP: 128/71 (13 May 2018 12:00) (104/59 - 129/72)  BP(mean): 93 (13 May 2018 12:00) (76 - 107)  RR: 30 (13 May 2018 12:00) (18 - 37)  SpO2: 100% (13 May 2018 12:32) (92% - 100%)    PHYSICAL EXAM:    GENERAL: resting comfortably  HEENT: MMM, trach in place  CHEST/LUNG: Clear to percussion bilaterally; No wheezing  HEART: S1S2+, Regular rate and rhythm   ABDOMEN: Soft, Nontender, Nondistended; Bowel sounds present  EXTREMITIES:  no edema      LABS:                        9.7    9.6   )-----------( 445      ( 13 May 2018 05:03 )             30.8     05-13    141  |  100  |  12.0  ----------------------------<  157<H>  3.9   |  30.0<H>  |  0.38<L>    Ca    9.2      13 May 2018 05:03  Mg     2.0     05-13              MEDICATIONS  (STANDING):  amLODIPine   Tablet 10 milliGRAM(s) Oral daily  atorvastatin 40 milliGRAM(s) Oral at bedtime  bisacodyl Suppository 10 milliGRAM(s) Rectal daily  cephalexin 500 milliGRAM(s) Oral four times a day  chlorhexidine 0.12% Liquid 15 milliLiter(s) Swish and Spit two times a day  enoxaparin Injectable 40 milliGRAM(s) SubCutaneous daily  folic acid 1 milliGRAM(s) Oral daily  iron sucrose IVPB 200 milliGRAM(s) IV Intermittent every 24 hours  lisinopril 5 milliGRAM(s) Oral daily  pantoprazole   Suspension 40 milliGRAM(s) Oral before lunch  petrolatum Ophthalmic Ointment 1 Application(s) Both EYES every 6 hours  senna 2 Tablet(s) Oral at bedtime  thiamine 100 milliGRAM(s) Oral daily    MEDICATIONS  (PRN):  acetaminophen    Suspension 650 milliGRAM(s) Oral every 6 hours PRN For Temp greater than 38 C (100.4 F)  hydrALAZINE Injectable 20 milliGRAM(s) IV Push every 6 hours PRN systolic > 150  lactulose Syrup 20 Gram(s) Oral every 6 hours PRN Constpation  polyethylene glycol 3350 17 Gram(s) Oral daily PRN Constipation      RADIOLOGY & ADDITIONAL TESTS:

## 2018-05-13 NOTE — PROGRESS NOTE ADULT - SUBJECTIVE AND OBJECTIVE BOX
[facility]                                        Neurology at [place]                                 Rima Whiteside, & Saad                                  [address]                                        [place], [place], [address]                                             [phone]        CC: Pontine intracranial hemorrhage.  HPI:  63y Female who c/o dizziness and then collapsed at home.  She had poor mental status and was intubated in the ED. There was no associated seizure activity.   She was found to have a large brainstem intracranial hemorrhage on CT.     Interim history:  She is now status post tracheostomy and PEG.   She is able to follow simple commands- wiggle toes    ROS:   Unobtainable due to patient's condition.     MEDICATIONS  (STANDING):  amLODIPine   Tablet 10 milliGRAM(s) Oral daily  atorvastatin 40 milliGRAM(s) Oral at bedtime  bisacodyl Suppository 10 milliGRAM(s) Rectal daily  cephalexin 500 milliGRAM(s) Oral four times a day  chlorhexidine 0.12% Liquid 15 milliLiter(s) Swish and Spit two times a day  enoxaparin Injectable 40 milliGRAM(s) SubCutaneous daily  folic acid 1 milliGRAM(s) Oral daily  iron sucrose IVPB 200 milliGRAM(s) IV Intermittent every 24 hours  lisinopril 5 milliGRAM(s) Oral daily  pantoprazole   Suspension 40 milliGRAM(s) Oral before lunch  petrolatum Ophthalmic Ointment 1 Application(s) Both EYES every 6 hours  senna 2 Tablet(s) Oral at bedtime  thiamine 100 milliGRAM(s) Oral daily    MEDICATIONS  (PRN):  acetaminophen    Suspension 650 milliGRAM(s) Oral every 6 hours PRN For Temp greater than 38 C (100.4 F)  hydrALAZINE Injectable 20 milliGRAM(s) IV Push every 6 hours PRN systolic > 150  lactulose Syrup 20 Gram(s) Oral every 6 hours PRN Constpation  polyethylene glycol 3350 17 Gram(s) Oral daily PRN Constipation    Vital Signs Last 24 Hrs  T(C): 38 (13 May 2018 14:00), Max: 38.4 (13 May 2018 12:00)  T(F): 100.4 (13 May 2018 14:00), Max: 101.1 (13 May 2018 12:00)  HR: 69 (13 May 2018 14:00) (66 - 85)  BP: 103/57 (13 May 2018 14:00) (103/57 - 129/72)  BP(mean): 74 (13 May 2018 14:00) (74 - 107)  RR: 23 (13 May 2018 14:00) (18 - 34)  SpO2: 100% (13 May 2018 14:00) (92% - 100%)    Detailed Neurologic Exam:    Mental status: Eyes open, following simple instructions.  No tracking with gaze today.     Cranial nerves: Pupils pinpoint. There is no blink to threat. There is roving eye movement in the vertical plane. Corneal reflexes absent. shoulder shrug, palate and tongue cannot be assessed.     Motor/Sensory:  There is normal bulk and tone.  There is no tremor.  There is minimal extensor posturing bilaterally to stimuli.  wiggles toes to request.    Reflexes: Trace throughout and plantar responses are extensor bilaterally.    Cerebellar: Cannot be tested.    Labs:                           9.7    9.6   )-----------( 445      ( 13 May 2018 05:03 )             30.8     05-13    141  |  100  |  12.0  ----------------------------<  157<H>  3.9   |  30.0<H>  |  0.38<L>    Ca    9.2      13 May 2018 05:03  Mg     2.0     05-13    Recent neurological studies: none

## 2018-05-13 NOTE — PROGRESS NOTE ADULT - ASSESSMENT
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     1) ICH ( intracranial hemorrhage )  Avoid antiplatelet.   Continue blood pressure control.   Supportive care/vent management.  Overall prognosis remains poor.   Ramos is s/p tracheostomy/PEG.    2) Altered mental status  Secondary to intracranial hemorrhage.     3) Hypertension  Continue blood pressure control.     4) will eventually need LTC in vent capable facility vs withdrawal of ventilatory support      will follow with you    Anirudh Abarca MD PhD   [id number]

## 2018-05-14 PROCEDURE — 99231 SBSQ HOSP IP/OBS SF/LOW 25: CPT

## 2018-05-14 PROCEDURE — 99291 CRITICAL CARE FIRST HOUR: CPT

## 2018-05-14 PROCEDURE — 99233 SBSQ HOSP IP/OBS HIGH 50: CPT

## 2018-05-14 RX ADMIN — Medication 500 MILLIGRAM(S): at 06:14

## 2018-05-14 RX ADMIN — SENNA PLUS 2 TABLET(S): 8.6 TABLET ORAL at 21:24

## 2018-05-14 RX ADMIN — Medication 650 MILLIGRAM(S): at 18:00

## 2018-05-14 RX ADMIN — Medication 500 MILLIGRAM(S): at 12:00

## 2018-05-14 RX ADMIN — LISINOPRIL 5 MILLIGRAM(S): 2.5 TABLET ORAL at 06:15

## 2018-05-14 RX ADMIN — PANTOPRAZOLE SODIUM 40 MILLIGRAM(S): 20 TABLET, DELAYED RELEASE ORAL at 12:00

## 2018-05-14 RX ADMIN — Medication 1 APPLICATION(S): at 06:06

## 2018-05-14 RX ADMIN — ATORVASTATIN CALCIUM 40 MILLIGRAM(S): 80 TABLET, FILM COATED ORAL at 21:24

## 2018-05-14 RX ADMIN — IRON SUCROSE 110 MILLIGRAM(S): 20 INJECTION, SOLUTION INTRAVENOUS at 09:02

## 2018-05-14 RX ADMIN — CHLORHEXIDINE GLUCONATE 15 MILLILITER(S): 213 SOLUTION TOPICAL at 18:00

## 2018-05-14 RX ADMIN — POLYETHYLENE GLYCOL 3350 17 GRAM(S): 17 POWDER, FOR SOLUTION ORAL at 12:00

## 2018-05-14 RX ADMIN — CHLORHEXIDINE GLUCONATE 15 MILLILITER(S): 213 SOLUTION TOPICAL at 06:07

## 2018-05-14 RX ADMIN — AMLODIPINE BESYLATE 10 MILLIGRAM(S): 2.5 TABLET ORAL at 06:15

## 2018-05-14 RX ADMIN — Medication 100 MILLIGRAM(S): at 12:00

## 2018-05-14 RX ADMIN — Medication 1 MILLIGRAM(S): at 12:00

## 2018-05-14 RX ADMIN — Medication 500 MILLIGRAM(S): at 23:56

## 2018-05-14 RX ADMIN — Medication 500 MILLIGRAM(S): at 18:00

## 2018-05-14 RX ADMIN — Medication 650 MILLIGRAM(S): at 12:00

## 2018-05-14 RX ADMIN — ENOXAPARIN SODIUM 40 MILLIGRAM(S): 100 INJECTION SUBCUTANEOUS at 12:00

## 2018-05-14 RX ADMIN — Medication 10 MILLIGRAM(S): at 11:59

## 2018-05-14 NOTE — PROGRESS NOTE ADULT - SUBJECTIVE AND OBJECTIVE BOX
CC: patient being seen for respiratory failure, pontine hemorrhage.     Present Symptoms:     Dyspnea: 0   Nausea/Vomiting: No  Anxiety:  No  Depression: No  Fatigue: Yes   Loss of appetite: unable     Pain: none             Character-            Duration-            Effect-            Factors-            Frequency-            Location-            Severity-    Review of Systems: Reviewed                   Unable to obtain due to poor mentation   All others negative    MEDICATIONS  (STANDING):  amLODIPine   Tablet 10 milliGRAM(s) Oral daily  atorvastatin 40 milliGRAM(s) Oral at bedtime  bisacodyl Suppository 10 milliGRAM(s) Rectal daily  cephalexin 500 milliGRAM(s) Oral four times a day  chlorhexidine 0.12% Liquid 15 milliLiter(s) Swish and Spit two times a day  enoxaparin Injectable 40 milliGRAM(s) SubCutaneous daily  folic acid 1 milliGRAM(s) Oral daily  iron sucrose IVPB 200 milliGRAM(s) IV Intermittent every 24 hours  lisinopril 5 milliGRAM(s) Oral daily  pantoprazole   Suspension 40 milliGRAM(s) Oral before lunch  senna 2 Tablet(s) Oral at bedtime  thiamine 100 milliGRAM(s) Oral daily    MEDICATIONS  (PRN):  acetaminophen    Suspension 650 milliGRAM(s) Oral every 6 hours PRN For Temp greater than 38 C (100.4 F)  hydrALAZINE Injectable 20 milliGRAM(s) IV Push every 6 hours PRN systolic > 150  lactulose Syrup 20 Gram(s) Oral every 6 hours PRN Constpation  polyethylene glycol 3350 17 Gram(s) Oral daily PRN Constipation    PHYSICAL EXAM:    Vital Signs Last 24 Hrs  T(C): 38 (14 May 2018 12:00), Max: 38.4 (13 May 2018 21:00)  T(F): 100.4 (14 May 2018 12:00), Max: 101.1 (13 May 2018 21:00)  HR: 877 (14 May 2018 12:19) (65 - 877)  BP: 128/62 (14 May 2018 12:00) (103/57 - 152/78)  BP(mean): 88 (14 May 2018 12:00) (74 - 113)  RR: 31 (14 May 2018 12:00) (17 - 33)  SpO2: 100% (14 May 2018 12:19) (99% - 100%)    General: alert  follows basic commands, squeezes bilateral hands. nods yes and no to questions     Karnofsky:  20 %    HEENT: trach     Lungs: comfortable     CV: normal      GI: normal      : bush    MSK: weakness edema     Skin: no rash    LABS:                      9.7    9.6   )-----------( 445      ( 13 May 2018 05:03 )             30.8     05-13    141  |  100  |  12.0  ----------------------------<  157<H>  3.9   |  30.0<H>  |  0.38<L>    Ca    9.2      13 May 2018 05:03  Mg     2.0     05-13    I&O's Summary    13 May 2018 07:01  -  14 May 2018 07:00  --------------------------------------------------------  IN: 2640 mL / OUT: 1275 mL / NET: 1365 mL    14 May 2018 07:01  -  14 May 2018 12:22  --------------------------------------------------------  IN: 600 mL / OUT: 330 mL / NET: 270 mL    RADIOLOGY & ADDITIONAL STUDIES:    ADVANCE DIRECTIVES: Full Code

## 2018-05-14 NOTE — PROGRESS NOTE ADULT - SUBJECTIVE AND OBJECTIVE BOX
[facility]                                                                     NEUROLOGY AT [place]                                                                       Papi Florentino Rogove                                                                      [address]                                                                           [place], [place], [address]                                                                                 [phone]                                                                           Neurology Progress Note        No response to voice  pupils react  Weak corneals  Some extensor posturing to stimulation    Pontine hemorrhage    Poor overall prognosis

## 2018-05-14 NOTE — PROGRESS NOTE ADULT - PROBLEM SELECTOR PLAN 1
Still with fevers - ?central vs infection  Blood and sputum cultures pending  No leukocytosis  Monitor off abx

## 2018-05-14 NOTE — PROGRESS NOTE ADULT - ATTENDING COMMENTS
Thank you for the opportunity to assist with the care of this patient.   [facility] [phone].

## 2018-05-14 NOTE — PROGRESS NOTE ADULT - PROBLEM SELECTOR PLAN 1
-patient appears to be nodding yes and no today, still prognosis for functional and neurological recovery remains poor.

## 2018-05-14 NOTE — PROGRESS NOTE ADULT - SUBJECTIVE AND OBJECTIVE BOX
PULMONARY PROGRESS NOTE      MALVIN BARRETO-[id number]    Patient is a 63y old  Female who presents with a chief complaint of complained of dizzyness and collapsed at home (23 Apr 2018 10:59)      INTERVAL HPI/OVERNIGHT EVENTS:Hx from chart; pt unable to provide hx. Patient is a 62F with a history of alcoholism, who presented with dizziness and then collapsed at home. Upon arrival in the ER she was obtunded and intubated for airway support. He BP was noted to be elevated. 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 They was no evidence of aneurysm or dissection noted on CTA. Her BP was controlled on IV Cardene. Neurologically, she was unresponsive, her pupils were small and nonreactive, there was a gag reflex, and she withdrew her legs and left hand to pain. Neurosurgery was consulted and confirmed there was no surgical or non-invasive intervention which could be offered.  Patient also seen by neuro and palliative in consult. Underwent trach and PEG on 5/10. Started on keflex for tracheitis. Currently on CPAP/PSV. Downgraded from ICU service.     Nonresponsive on CPAP mode.  MEDICATIONS  (STANDING):  amLODIPine   Tablet 10 milliGRAM(s) Oral daily  atorvastatin 40 milliGRAM(s) Oral at bedtime  bisacodyl Suppository 10 milliGRAM(s) Rectal daily  cephalexin 500 milliGRAM(s) Oral four times a day  chlorhexidine 0.12% Liquid 15 milliLiter(s) Swish and Spit two times a day  enoxaparin Injectable 40 milliGRAM(s) SubCutaneous daily  folic acid 1 milliGRAM(s) Oral daily  iron sucrose IVPB 200 milliGRAM(s) IV Intermittent every 24 hours  lisinopril 5 milliGRAM(s) Oral daily  pantoprazole   Suspension 40 milliGRAM(s) Oral before lunch  petrolatum Ophthalmic Ointment 1 Application(s) Both EYES every 6 hours  senna 2 Tablet(s) Oral at bedtime  thiamine 100 milliGRAM(s) Oral daily      MEDICATIONS  (PRN):  acetaminophen    Suspension 650 milliGRAM(s) Oral every 6 hours PRN For Temp greater than 38 C (100.4 F)  hydrALAZINE Injectable 20 milliGRAM(s) IV Push every 6 hours PRN systolic > 150  lactulose Syrup 20 Gram(s) Oral every 6 hours PRN Constpation  polyethylene glycol 3350 17 Gram(s) Oral daily PRN Constipation      Allergies    No Known Allergies    Intolerances        PAST MEDICAL & SURGICAL HISTORY:  Alcohol abuse  No significant past surgical history      SOCIAL HISTORY  Smoking History:       REVIEW OF SYSTEMS:  unresponsive.  Vital Signs Last 24 Hrs  T(C): 37.3 (14 May 2018 08:00), Max: 38.4 (13 May 2018 12:00)  T(F): 99.1 (14 May 2018 08:00), Max: 101.1 (13 May 2018 12:00)  HR: 65 (14 May 2018 08:00) (65 - 92)  BP: 111/62 (14 May 2018 08:00) (103/57 - 152/78)  BP(mean): 80 (14 May 2018 08:00) (74 - 113)  RR: 21 (14 May 2018 08:00) (17 - 33)  SpO2: 100% (14 May 2018 08:00) (99% - 100%)    PHYSICAL EXAMINATION:    GENERAL: unresponsive, NAD on vent    HEENT: Head is normocephalic and atraumatic. Extraocular muscles are intact. Mucous membranes are moist.    NECK: Supple.    LUNGS: Clear to auscultation without wheezing, rales or rhonchi; respirations unlabored    HEART: Regular rate and rhythm without murmur.    ABDOMEN: Soft, nontender, and nondistended.      EXTREMITIES: Without any cyanosis, clubbing, rash, lesions or edema.    NEUROLOGIC: unresponsive    SKIN: No ulceration or induration present.      LABS:                        9.7    9.6   )-----------( 445      ( 13 May 2018 05:03 )             30.8     05-13    141  |  100  |  12.0  ----------------------------<  157<H>  3.9   |  30.0<H>  |  0.38<L>    Ca    9.2      13 May 2018 05:03  Mg     2.0     05-13                          MICROBIOLOGY:    RADIOLOGY & ADDITIONAL STUDIES:< from: Xray Chest 1 View- PORTABLE-Routine (05.12.18 @ 15:59) >  Impression:    No acute pulmonary disease.      < end of copied text >

## 2018-05-14 NOTE — PROGRESS NOTE ADULT - SUBJECTIVE AND OBJECTIVE BOX
FAUSTO BARRETO    [id number]    63y      Female    INTERVAL HPI/OVERNIGHT EVENTS: Easily arousable to verbal stimuli. Shakes head when asked if in pain.    Hospital course:  Patient is a 62F with a history of alcoholism, who presented with dizziness and then collapsed at home. Upon arrival in the ER she was obtunded and intubated for airway support. He BP was noted to be elevated. 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 They was no evidence of aneurysm or dissection noted on CTA. Her BP was controlled on IV Cardene. Neurologically, she was unresponsive, her pupils were small and nonreactive, there was a gag reflex, and she withdrew her legs and left hand to pain. Neurosurgery was consulted and confirmed there was no surgical or non-invasive intervention which could be offered.  Patient also seen by neuro and palliative in consult. On 5/10, pt had PEG and trach, then was started on keflex for tracheitis. Pt was downgraded on 5/11 to medicine service.       REVIEW OF SYSTEMS:    CARDIOVASCULAR: No chest pain   GASTROINTESTINAL: No abdominal pain      Vital Signs Last 24 Hrs  T(C): 37.7 (14 May 2018 10:00), Max: 38.4 (13 May 2018 12:00)  T(F): 99.9 (14 May 2018 10:00), Max: 101.1 (13 May 2018 12:00)  HR: 73 (14 May 2018 10:00) (65 - 92)  BP: 124/66 (14 May 2018 10:00) (103/57 - 152/78)  BP(mean): 88 (14 May 2018 10:00) (74 - 113)  RR: 33 (14 May 2018 10:00) (17 - 33)  SpO2: 99% (14 May 2018 10:00) (99% - 100%)    PHYSICAL EXAM:    GENERAL: NAD   HEENT: MMM  CHEST/LUNG: Clear to percussion bilaterally; No wheezing  HEART: S1S2+, Regular rate and rhythm   ABDOMEN: Soft, Nontender, Nondistended; Bowel sounds present  EXTREMITIES: [facility]    LABS:                        9.7    9.6   )-----------( 445      ( 13 May 2018 05:03 )             30.8     05-13    141  |  100  |  12.0  ----------------------------<  157<H>  3.9   |  30.0<H>  |  0.38<L>    Ca    9.2      13 May 2018 05:03  Mg     2.0     05-13              MEDICATIONS  (STANDING):  amLODIPine   Tablet 10 milliGRAM(s) Oral daily  atorvastatin 40 milliGRAM(s) Oral at bedtime  bisacodyl Suppository 10 milliGRAM(s) Rectal daily  cephalexin 500 milliGRAM(s) Oral four times a day  chlorhexidine 0.12% Liquid 15 milliLiter(s) Swish and Spit two times a day  enoxaparin Injectable 40 milliGRAM(s) SubCutaneous daily  folic acid 1 milliGRAM(s) Oral daily  iron sucrose IVPB 200 milliGRAM(s) IV Intermittent every 24 hours  lisinopril 5 milliGRAM(s) Oral daily  pantoprazole   Suspension 40 milliGRAM(s) Oral before lunch  senna 2 Tablet(s) Oral at bedtime  thiamine 100 milliGRAM(s) Oral daily    MEDICATIONS  (PRN):  acetaminophen    Suspension 650 milliGRAM(s) Oral every 6 hours PRN For Temp greater than 38 C (100.4 F)  hydrALAZINE Injectable 20 milliGRAM(s) IV Push every 6 hours PRN systolic > 150  lactulose Syrup 20 Gram(s) Oral every 6 hours PRN Constpation  polyethylene glycol 3350 17 Gram(s) Oral daily PRN Constipation      RADIOLOGY & ADDITIONAL TESTS:

## 2018-05-14 NOTE — PROGRESS NOTE ADULT - PROBLEM SELECTOR PLAN 2
- full assist with all ADLs. Will need placement in a nursing home, unless she decompensates and family opts to pursue withdrawal/comfort.

## 2018-05-14 NOTE — PROGRESS NOTE ADULT - PROBLEM SELECTOR PLAN 3
-patient is critically ill but stable at this point. She seems to have some meaningful responses; however, prognosis for neurological and functional recovery remain extremely poor. If  wants to continue aggressive treatments, then we are looking at placement in a long term care ventilator facility. I had discussed with him DNR last week after the trach but have not heard back from him if he has had a chance to discuss that with the rest of the family.

## 2018-05-15 PROCEDURE — 99232 SBSQ HOSP IP/OBS MODERATE 35: CPT

## 2018-05-15 PROCEDURE — 99233 SBSQ HOSP IP/OBS HIGH 50: CPT

## 2018-05-15 RX ORDER — TAMSULOSIN HYDROCHLORIDE 0.4 MG/1
0.4 CAPSULE ORAL AT BEDTIME
Qty: 0 | Refills: 0 | Status: DISCONTINUED | OUTPATIENT
Start: 2018-05-15 | End: 2018-05-19

## 2018-05-15 RX ADMIN — AMLODIPINE BESYLATE 10 MILLIGRAM(S): 2.5 TABLET ORAL at 05:49

## 2018-05-15 RX ADMIN — IRON SUCROSE 110 MILLIGRAM(S): 20 INJECTION, SOLUTION INTRAVENOUS at 09:01

## 2018-05-15 RX ADMIN — CHLORHEXIDINE GLUCONATE 15 MILLILITER(S): 213 SOLUTION TOPICAL at 05:49

## 2018-05-15 RX ADMIN — Medication 500 MILLIGRAM(S): at 17:41

## 2018-05-15 RX ADMIN — Medication 650 MILLIGRAM(S): at 21:54

## 2018-05-15 RX ADMIN — LISINOPRIL 5 MILLIGRAM(S): 2.5 TABLET ORAL at 05:49

## 2018-05-15 RX ADMIN — Medication 500 MILLIGRAM(S): at 23:43

## 2018-05-15 RX ADMIN — ENOXAPARIN SODIUM 40 MILLIGRAM(S): 100 INJECTION SUBCUTANEOUS at 12:03

## 2018-05-15 RX ADMIN — Medication 650 MILLIGRAM(S): at 12:03

## 2018-05-15 RX ADMIN — SENNA PLUS 2 TABLET(S): 8.6 TABLET ORAL at 21:54

## 2018-05-15 RX ADMIN — Medication 1 MILLIGRAM(S): at 12:05

## 2018-05-15 RX ADMIN — CHLORHEXIDINE GLUCONATE 15 MILLILITER(S): 213 SOLUTION TOPICAL at 17:41

## 2018-05-15 RX ADMIN — PANTOPRAZOLE SODIUM 40 MILLIGRAM(S): 20 TABLET, DELAYED RELEASE ORAL at 12:03

## 2018-05-15 RX ADMIN — Medication 500 MILLIGRAM(S): at 05:49

## 2018-05-15 RX ADMIN — Medication 100 MILLIGRAM(S): at 12:04

## 2018-05-15 RX ADMIN — Medication 500 MILLIGRAM(S): at 12:03

## 2018-05-15 RX ADMIN — ATORVASTATIN CALCIUM 40 MILLIGRAM(S): 80 TABLET, FILM COATED ORAL at 21:54

## 2018-05-15 RX ADMIN — TAMSULOSIN HYDROCHLORIDE 0.4 MILLIGRAM(S): 0.4 CAPSULE ORAL at 21:55

## 2018-05-15 RX ADMIN — POLYETHYLENE GLYCOL 3350 17 GRAM(S): 17 POWDER, FOR SOLUTION ORAL at 12:03

## 2018-05-15 NOTE — PROGRESS NOTE ADULT - SUBJECTIVE AND OBJECTIVE BOX
FAUSTO BARRETO    [id number]    63y      Female    INTERVAL HPI/OVERNIGHT EVENTS: Awake and responsive. Shakes head when asked if uncomfortable. Participated with PT this morning. Had BM last night.     Hospital course:  Patient is a 62F with a history of alcoholism, who presented with dizziness and then collapsed at home. Upon arrival in the ER she was obtunded and intubated for airway support. He BP was noted to be elevated. 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 They was no evidence of aneurysm or dissection noted on CTA. Her BP was controlled on IV Cardene. Neurologically, she was unresponsive, her pupils were small and nonreactive, there was a gag reflex, and she withdrew her legs and left hand to pain. Neurosurgery was consulted and confirmed there was no surgical or non-invasive intervention which could be offered.  Patient also seen by neuro and palliative in consult. On 5/10, pt had PEG and trach, then was started on keflex for tracheitis. Pt was downgraded on 5/11 to medicine service. Failed two trials of void, necessitating bush.       REVIEW OF SYSTEMS:  Unable to obtain       Vital Signs Last 24 Hrs  T(C): 37.8 (15 May 2018 12:00), Max: 38.5 (14 May 2018 18:00)  T(F): 100 (15 May 2018 12:00), Max: 101.3 (14 May 2018 18:00)  HR: 75 (15 May 2018 12:00) (67 - 89)  BP: 129/70 (15 May 2018 10:00) (100/61 - 139/83)  BP(mean): 95 (15 May 2018 10:00) (62 - 104)  RR: 33 (15 May 2018 12:00) (20 - 48)  SpO2: 100% (15 May 2018 12:00) (81% - 100%)    PHYSICAL EXAM:    GENERAL: NAD  HEENT: MMM  CHEST/LUNG: Clear to percussion bilaterally; No wheezing  HEART: S1S2+, Regular rate and rhythm   ABDOMEN: Soft, Nontender, Nondistended; Bowel sounds present  EXTREMITIES: no edema    LABS:                  MEDICATIONS  (STANDING):  amLODIPine   Tablet 10 milliGRAM(s) Oral daily  atorvastatin 40 milliGRAM(s) Oral at bedtime  bisacodyl Suppository 10 milliGRAM(s) Rectal daily  cephalexin 500 milliGRAM(s) Oral four times a day  chlorhexidine 0.12% Liquid 15 milliLiter(s) Swish and Spit two times a day  enoxaparin Injectable 40 milliGRAM(s) SubCutaneous daily  folic acid 1 milliGRAM(s) Oral daily  iron sucrose IVPB 200 milliGRAM(s) IV Intermittent every 24 hours  lisinopril 5 milliGRAM(s) Oral daily  pantoprazole   Suspension 40 milliGRAM(s) Oral before lunch  senna 2 Tablet(s) Oral at bedtime  thiamine 100 milliGRAM(s) Oral daily    MEDICATIONS  (PRN):  acetaminophen    Suspension 650 milliGRAM(s) Oral every 6 hours PRN For Temp greater than 38 C (100.4 F)  hydrALAZINE Injectable 20 milliGRAM(s) IV Push every 6 hours PRN systolic > 150  lactulose Syrup 20 Gram(s) Oral every 6 hours PRN Constpation  polyethylene glycol 3350 17 Gram(s) Oral daily PRN Constipation      RADIOLOGY & ADDITIONAL TESTS:

## 2018-05-15 NOTE — PROGRESS NOTE ADULT - SUBJECTIVE AND OBJECTIVE BOX
[facility]                                        Neurology at [place]                                 Rima Whiteside, & Saad                                  [address]                                        [place], [place], [address]                                             [phone]        CC: Pontine intracranial hemorrhage.  HPI:  63y Female who c/o dizziness and then collapsed at home.  She had poor mental status and was intubated in the ED. There was no associated seizure activity.   She was found to have a large brainstem intracranial hemorrhage on CT.     Interim history:  She is now status post tracheostomy and PEG.   She is able to follow simple commands intermittently.    ROS:   Denies headache or dizziness.  Denies chest pain.  Denies shortness of breath.    MEDICATIONS  (STANDING):  amLODIPine   Tablet 10 milliGRAM(s) Oral daily  atorvastatin 40 milliGRAM(s) Oral at bedtime  bisacodyl Suppository 10 milliGRAM(s) Rectal daily  cephalexin 500 milliGRAM(s) Oral four times a day  chlorhexidine 0.12% Liquid 15 milliLiter(s) Swish and Spit two times a day  enoxaparin Injectable 40 milliGRAM(s) SubCutaneous daily  folic acid 1 milliGRAM(s) Oral daily  iron sucrose IVPB 200 milliGRAM(s) IV Intermittent every 24 hours  lisinopril 5 milliGRAM(s) Oral daily  pantoprazole   Suspension 40 milliGRAM(s) Oral before lunch  senna 2 Tablet(s) Oral at bedtime  tamsulosin 0.4 milliGRAM(s) Oral at bedtime  thiamine 100 milliGRAM(s) Oral daily      Vital Signs Last 24 Hrs  T(C): 37.8 (15 May 2018 12:00), Max: 38.5 (14 May 2018 18:00)  T(F): 100 (15 May 2018 12:00), Max: 101.3 (14 May 2018 18:00)  HR: 75 (15 May 2018 12:00) (67 - 89)  BP: 105/60 (15 May 2018 12:00) (100/61 - 139/83)  BP(mean): 77 (15 May 2018 12:00) (62 - 104)  RR: 33 (15 May 2018 12:00) (20 - 48)  SpO2: 100% (15 May 2018 12:00) (81% - 100%)    Detailed Neurologic Exam:    Mental status: Eyes open, following simple instructions.  No tracking with gaze today.     Cranial nerves: Pupils pinpoint. There is no blink to threat. There is roving eye movement in the vertical plane. Corneal reflexes absent. shoulder shrug, palate and tongue cannot be assessed.     Motor/Sensory:  There is normal bulk and tone.  There is no tremor.  There is minimal extensor posturing of the right arm to stimuli.  Squeezes hand and shows fingers (although not reliably the number asked for). Wiggles toes to request.    Reflexes: Trace throughout and plantar responses are extensor bilaterally.    Cerebellar: Cannot be tested.    Labs:

## 2018-05-15 NOTE — PROGRESS NOTE ADULT - ASSESSMENT
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     1) ICH ( intracranial hemorrhage )  Avoid antiplatelet.   Continue blood pressure control.   Supportive care/vent management.  Overall prognosis remains guarded although she seems slightly improved from last week.   Ramos is s/p tracheostomy/PEG.    2) Altered mental status  Secondary to intracranial hemorrhage.     3) Hypertension  Continue blood pressure control.     4) will eventually need LTC in vent capable facility vs withdrawal of ventilatory support

## 2018-05-15 NOTE — PHYSICAL THERAPY INITIAL EVALUATION ADULT - MANUAL MUSCLE TESTING RESULTS, REHAB EVAL
grossly observed: right UE: 3-/5 throughout, right UE: grossly 2/5 throughout, right LE: grossly 3-/5 throughout, left LE: grossly 2/5 throughout

## 2018-05-15 NOTE — PHYSICAL THERAPY INITIAL EVALUATION ADULT - CRITERIA FOR SKILLED THERAPEUTIC INTERVENTIONS
impairments found/rehab potential/therapy frequency/functional limitations in following categories/predicted duration of therapy intervention/anticipated discharge recommendation

## 2018-05-16 DIAGNOSIS — A49.2 HEMOPHILUS INFLUENZAE INFECTION, UNSPECIFIED SITE: ICD-10-CM

## 2018-05-16 LAB
-  AMPICILLIN: SIGNIFICANT CHANGE UP
-  CEFTRIAXONE: SIGNIFICANT CHANGE UP
-  CHLORAMPHENICOL: SIGNIFICANT CHANGE UP
-  LEVOFLOXACIN: SIGNIFICANT CHANGE UP
-  TRIMETHOPRIM/SULFAMETHOXAZOLE: SIGNIFICANT CHANGE UP
ANION GAP SERPL CALC-SCNC: 11 MMOL/L — SIGNIFICANT CHANGE UP (ref 5–17)
BUN SERPL-MCNC: 9 MG/DL — SIGNIFICANT CHANGE UP (ref 8–20)
CALCIUM SERPL-MCNC: 9.7 MG/DL — SIGNIFICANT CHANGE UP (ref 8.6–10.2)
CHLORIDE SERPL-SCNC: 99 MMOL/L — SIGNIFICANT CHANGE UP (ref 98–107)
CO2 SERPL-SCNC: 31 MMOL/L — HIGH (ref 22–29)
CREAT SERPL-MCNC: 0.4 MG/DL — LOW (ref 0.5–1.3)
CULTURE RESULTS: SIGNIFICANT CHANGE UP
GLUCOSE SERPL-MCNC: 135 MG/DL — HIGH (ref 70–115)
HCT VFR BLD CALC: 33.3 % — LOW (ref 37–47)
HGB BLD-MCNC: 10.3 G/DL — LOW (ref 12–16)
MAGNESIUM SERPL-MCNC: 2 MG/DL — SIGNIFICANT CHANGE UP (ref 1.6–2.6)
MCHC RBC-ENTMCNC: 30.4 PG — SIGNIFICANT CHANGE UP (ref 27–31)
MCHC RBC-ENTMCNC: 30.9 G/DL — LOW (ref 32–36)
MCV RBC AUTO: 98.2 FL — SIGNIFICANT CHANGE UP (ref 81–99)
METHOD TYPE: SIGNIFICANT CHANGE UP
METHOD TYPE: SIGNIFICANT CHANGE UP
ORGANISM # SPEC MICROSCOPIC CNT: SIGNIFICANT CHANGE UP
PLATELET # BLD AUTO: 429 K/UL — HIGH (ref 150–400)
POTASSIUM SERPL-MCNC: 4.5 MMOL/L — SIGNIFICANT CHANGE UP (ref 3.5–5.3)
POTASSIUM SERPL-SCNC: 4.5 MMOL/L — SIGNIFICANT CHANGE UP (ref 3.5–5.3)
RBC # BLD: 3.39 M/UL — LOW (ref 4.4–5.2)
RBC # FLD: 14.6 % — SIGNIFICANT CHANGE UP (ref 11–15.6)
SODIUM SERPL-SCNC: 141 MMOL/L — SIGNIFICANT CHANGE UP (ref 135–145)
SPECIMEN SOURCE: SIGNIFICANT CHANGE UP
WBC # BLD: 13.3 K/UL — HIGH (ref 4.8–10.8)
WBC # FLD AUTO: 13.3 K/UL — HIGH (ref 4.8–10.8)

## 2018-05-16 PROCEDURE — 99232 SBSQ HOSP IP/OBS MODERATE 35: CPT

## 2018-05-16 PROCEDURE — 99233 SBSQ HOSP IP/OBS HIGH 50: CPT

## 2018-05-16 RX ADMIN — LISINOPRIL 5 MILLIGRAM(S): 2.5 TABLET ORAL at 05:22

## 2018-05-16 RX ADMIN — ENOXAPARIN SODIUM 40 MILLIGRAM(S): 100 INJECTION SUBCUTANEOUS at 11:17

## 2018-05-16 RX ADMIN — ATORVASTATIN CALCIUM 40 MILLIGRAM(S): 80 TABLET, FILM COATED ORAL at 21:43

## 2018-05-16 RX ADMIN — CHLORHEXIDINE GLUCONATE 15 MILLILITER(S): 213 SOLUTION TOPICAL at 18:01

## 2018-05-16 RX ADMIN — AMLODIPINE BESYLATE 10 MILLIGRAM(S): 2.5 TABLET ORAL at 05:22

## 2018-05-16 RX ADMIN — CHLORHEXIDINE GLUCONATE 15 MILLILITER(S): 213 SOLUTION TOPICAL at 05:22

## 2018-05-16 RX ADMIN — Medication 500 MILLIGRAM(S): at 05:22

## 2018-05-16 RX ADMIN — PANTOPRAZOLE SODIUM 40 MILLIGRAM(S): 20 TABLET, DELAYED RELEASE ORAL at 11:31

## 2018-05-16 RX ADMIN — Medication 1 MILLIGRAM(S): at 11:17

## 2018-05-16 RX ADMIN — Medication 100 MILLIGRAM(S): at 11:31

## 2018-05-16 RX ADMIN — Medication 500 MILLIGRAM(S): at 11:17

## 2018-05-16 RX ADMIN — SENNA PLUS 2 TABLET(S): 8.6 TABLET ORAL at 21:43

## 2018-05-16 RX ADMIN — IRON SUCROSE 110 MILLIGRAM(S): 20 INJECTION, SOLUTION INTRAVENOUS at 09:58

## 2018-05-16 RX ADMIN — Medication 10 MILLIGRAM(S): at 11:17

## 2018-05-16 NOTE — PROGRESS NOTE ADULT - SUBJECTIVE AND OBJECTIVE BOX
FAUSTO BARRETO    [id number]    63y      Female    INTERVAL HPI/OVERNIGHT EVENTS: Awake and alert.     Hospital course;  Patient is a 62F with a history of alcoholism, who presented with dizziness and then collapsed at home. Upon arrival in the ER she was obtunded and intubated for airway support. He BP was noted to be elevated. 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 They was no evidence of aneurysm or dissection noted on CTA. Her BP was controlled on IV Cardene. Neurologically, she was unresponsive, her pupils were small and nonreactive, there was a gag reflex, and she withdrew her legs and left hand to pain. Neurosurgery was consulted and confirmed there was no surgical or non-invasive intervention which could be offered.  Patient also seen by neuro and palliative in consult. On 5/10, pt had PEG and trach, then was started on keflex for tracheitis. Pt was downgraded on 5/11 to medicine service. Failed two trials of void, necessitating bush. Remained with low grade fever. Blood cultures negative. Sputum cultures with haemophilus influenzae. Transitioned from keflex to levaquin.     REVIEW OF SYSTEMS:  Denies chest pain. Denies abdominal pain.       Vital Signs Last 24 Hrs  T(C): 37.5 (16 May 2018 11:00), Max: 38 (15 May 2018 22:00)  T(F): 99.5 (16 May 2018 11:00), Max: 100.4 (15 May 2018 22:00)  HR: 79 (16 May 2018 13:00) (66 - 87)  BP: 137/65 (16 May 2018 13:00) (98/56 - 137/65)  BP(mean): 94 (16 May 2018 13:00) (71 - 95)  RR: 18 (16 May 2018 13:00) (18 - 39)  SpO2: 99% (16 May 2018 13:00) (79% - 100%)    PHYSICAL EXAM:    GENERAL: NAD   HEENT: MMM  CHEST/LUNG: Coarse breath sounds  HEART: S1S2+, Regular rate and rhythm  ABDOMEN: Soft, Nontender, Nondistended; Bowel sounds present  EXTREMITIES:  [facility]      LABS:                        10.3   13.3  )-----------( 429      ( 16 May 2018 06:11 )             33.3     05-16    141  |  99  |  9.0  ----------------------------<  135<H>  4.5   |  31.0<H>  |  0.40<L>    Ca    9.7      16 May 2018 06:11  Mg     2.0     05-16              MEDICATIONS  (STANDING):  amLODIPine   Tablet 10 milliGRAM(s) Oral daily  atorvastatin 40 milliGRAM(s) Oral at bedtime  bisacodyl Suppository 10 milliGRAM(s) Rectal daily  chlorhexidine 0.12% Liquid 15 milliLiter(s) Swish and Spit two times a day  enoxaparin Injectable 40 milliGRAM(s) SubCutaneous daily  folic acid 1 milliGRAM(s) Oral daily  levoFLOXacin  Tablet 750 milliGRAM(s) Oral every 24 hours  lisinopril 5 milliGRAM(s) Oral daily  pantoprazole   Suspension 40 milliGRAM(s) Oral before lunch  senna 2 Tablet(s) Oral at bedtime  tamsulosin 0.4 milliGRAM(s) Oral at bedtime  thiamine 100 milliGRAM(s) Oral daily    MEDICATIONS  (PRN):  acetaminophen    Suspension 650 milliGRAM(s) Oral every 6 hours PRN For Temp greater than 38 C (100.4 F)  hydrALAZINE Injectable 20 milliGRAM(s) IV Push every 6 hours PRN systolic > 150  lactulose Syrup 20 Gram(s) Oral every 6 hours PRN Constpation  polyethylene glycol 3350 17 Gram(s) Oral daily PRN Constipation      RADIOLOGY & ADDITIONAL TESTS:

## 2018-05-16 NOTE — PROGRESS NOTE ADULT - SUBJECTIVE AND OBJECTIVE BOX
[facility]                                        Neurology at [place]                                 Rima Whiteside, & Saad                                  [address]                                        [place], [place], [address]                                             [phone]        CC: Pontine intracranial hemorrhage.  HPI:  63y Female who c/o dizziness and then collapsed at home.  She had poor mental status and was intubated in the ED. There was no associated seizure activity.   She was found to have a large brainstem intracranial hemorrhage on CT.     Interim history:  She is now status post tracheostomy and PEG.   She is able to follow simple commands and answer yes/no questions.    ROS:   Denies headache or dizziness.    MEDICATIONS  (STANDING):  amLODIPine   Tablet 10 milliGRAM(s) Oral daily  atorvastatin 40 milliGRAM(s) Oral at bedtime  bisacodyl Suppository 10 milliGRAM(s) Rectal daily  chlorhexidine 0.12% Liquid 15 milliLiter(s) Swish and Spit two times a day  enoxaparin Injectable 40 milliGRAM(s) SubCutaneous daily  folic acid 1 milliGRAM(s) Oral daily  levoFLOXacin  Tablet 750 milliGRAM(s) Oral every 24 hours  lisinopril 5 milliGRAM(s) Oral daily  pantoprazole   Suspension 40 milliGRAM(s) Oral before lunch  senna 2 Tablet(s) Oral at bedtime  tamsulosin 0.4 milliGRAM(s) Oral at bedtime  thiamine 100 milliGRAM(s) Oral daily    MEDICATIONS  (PRN):  acetaminophen    Suspension 650 milliGRAM(s) Oral every 6 hours PRN For Temp greater than 38 C (100.4 F)  hydrALAZINE Injectable 20 milliGRAM(s) IV Push every 6 hours PRN systolic > 150  lactulose Syrup 20 Gram(s) Oral every 6 hours PRN Constpation  polyethylene glycol 3350 17 Gram(s) Oral daily PRN Constipation    Vital Signs Last 24 Hrs  T(C): 38 (16 May 2018 16:17), Max: 38 (15 May 2018 22:00)  T(F): 100.4 (16 May 2018 16:17), Max: 100.4 (15 May 2018 22:00)  HR: 74 (16 May 2018 16:53) (66 - 87)  BP: 123/58 (16 May 2018 16:00) (98/56 - 144/69)  BP(mean): 84 (16 May 2018 16:00) (71 - 99)  RR: 22 (16 May 2018 16:00) (16 - 39)  SpO2: 100% (16 May 2018 16:53) (79% - 100%)    Detailed Neurologic Exam:    Mental status: Eyes open, following simple instructions.  vertical tracking with gaze only.     Cranial nerves: Pupils pinpoint. There is no blink to threat. There is roving eye movement in the vertical plane. Corneal reflexes absent. shoulder shrug, palate and tongue cannot be assessed.     Motor/Sensory:  There is normal bulk and tone.  There is no tremor.  There is minimal extensor posturing of the right arm to stimuli.  Squeezes hand and shows fingers on right not left  Wiggles toes to request.    Reflexes: Trace throughout and plantar responses are extensor bilaterally.    Cerebellar: Cannot be tested.    Labs:                           10.3   13.3  )-----------( 429      ( 16 May 2018 06:11 )             33.3     05-16    141  |  99  |  9.0  ----------------------------<  135<H>  4.5   |  31.0<H>  |  0.40<L>    Ca    9.7      16 May 2018 06:11  Mg     2.0     05-16      rad:  Recent neurological studies: none

## 2018-05-16 NOTE — PROGRESS NOTE ADULT - ASSESSMENT
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     1) ICH ( intracranial hemorrhage )  Avoid antiplatelet.   Continue blood pressure control.   Supportive care/vent management.  Overall prognosis remains guarded although she is improved from last week.   She is s/p tracheostomy/PEG.    2) Altered mental status  Secondary to intracranial hemorrhage.     3) Hypertension  Continue blood pressure control.     4) will eventually need LTC in vent capable facility vs withdrawal of ventilatory support.    will follow with you    Anirudh Abarca MD PhD   [id number]

## 2018-05-17 LAB
ANION GAP SERPL CALC-SCNC: 11 MMOL/L — SIGNIFICANT CHANGE UP (ref 5–17)
BUN SERPL-MCNC: 11 MG/DL — SIGNIFICANT CHANGE UP (ref 8–20)
CALCIUM SERPL-MCNC: 9.7 MG/DL — SIGNIFICANT CHANGE UP (ref 8.6–10.2)
CHLORIDE SERPL-SCNC: 101 MMOL/L — SIGNIFICANT CHANGE UP (ref 98–107)
CO2 SERPL-SCNC: 30 MMOL/L — HIGH (ref 22–29)
CREAT SERPL-MCNC: 0.51 MG/DL — SIGNIFICANT CHANGE UP (ref 0.5–1.3)
GLUCOSE SERPL-MCNC: 131 MG/DL — HIGH (ref 70–115)
HCT VFR BLD CALC: 32.2 % — LOW (ref 37–47)
HGB BLD-MCNC: 9.5 G/DL — LOW (ref 12–16)
MAGNESIUM SERPL-MCNC: 2 MG/DL — SIGNIFICANT CHANGE UP (ref 1.6–2.6)
MCHC RBC-ENTMCNC: 29.5 G/DL — LOW (ref 32–36)
MCHC RBC-ENTMCNC: 29.6 PG — SIGNIFICANT CHANGE UP (ref 27–31)
MCV RBC AUTO: 100.3 FL — HIGH (ref 81–99)
PLATELET # BLD AUTO: 378 K/UL — SIGNIFICANT CHANGE UP (ref 150–400)
POTASSIUM SERPL-MCNC: 4.3 MMOL/L — SIGNIFICANT CHANGE UP (ref 3.5–5.3)
POTASSIUM SERPL-SCNC: 4.3 MMOL/L — SIGNIFICANT CHANGE UP (ref 3.5–5.3)
RBC # BLD: 3.21 M/UL — LOW (ref 4.4–5.2)
RBC # FLD: 14.9 % — SIGNIFICANT CHANGE UP (ref 11–15.6)
SODIUM SERPL-SCNC: 142 MMOL/L — SIGNIFICANT CHANGE UP (ref 135–145)
WBC # BLD: 10.5 K/UL — SIGNIFICANT CHANGE UP (ref 4.8–10.8)
WBC # FLD AUTO: 10.5 K/UL — SIGNIFICANT CHANGE UP (ref 4.8–10.8)

## 2018-05-17 PROCEDURE — 99233 SBSQ HOSP IP/OBS HIGH 50: CPT

## 2018-05-17 RX ORDER — IBUPROFEN 200 MG
600 TABLET ORAL ONCE
Qty: 0 | Refills: 0 | Status: COMPLETED | OUTPATIENT
Start: 2018-05-17 | End: 2018-05-17

## 2018-05-17 RX ORDER — OFLOXACIN 0.3 %
1 DROPS OPHTHALMIC (EYE) THREE TIMES A DAY
Qty: 0 | Refills: 0 | Status: COMPLETED | OUTPATIENT
Start: 2018-05-17 | End: 2018-05-24

## 2018-05-17 RX ADMIN — SENNA PLUS 2 TABLET(S): 8.6 TABLET ORAL at 21:09

## 2018-05-17 RX ADMIN — Medication 1 DROP(S): at 12:06

## 2018-05-17 RX ADMIN — Medication 10 MILLIGRAM(S): at 12:06

## 2018-05-17 RX ADMIN — Medication 1 MILLIGRAM(S): at 12:05

## 2018-05-17 RX ADMIN — CHLORHEXIDINE GLUCONATE 15 MILLILITER(S): 213 SOLUTION TOPICAL at 17:01

## 2018-05-17 RX ADMIN — PANTOPRAZOLE SODIUM 40 MILLIGRAM(S): 20 TABLET, DELAYED RELEASE ORAL at 12:05

## 2018-05-17 RX ADMIN — AMLODIPINE BESYLATE 10 MILLIGRAM(S): 2.5 TABLET ORAL at 05:10

## 2018-05-17 RX ADMIN — Medication 100 MILLIGRAM(S): at 12:05

## 2018-05-17 RX ADMIN — Medication 650 MILLIGRAM(S): at 22:51

## 2018-05-17 RX ADMIN — Medication 650 MILLIGRAM(S): at 05:16

## 2018-05-17 RX ADMIN — LISINOPRIL 5 MILLIGRAM(S): 2.5 TABLET ORAL at 05:10

## 2018-05-17 RX ADMIN — CHLORHEXIDINE GLUCONATE 15 MILLILITER(S): 213 SOLUTION TOPICAL at 05:11

## 2018-05-17 RX ADMIN — Medication 1 DROP(S): at 21:10

## 2018-05-17 RX ADMIN — Medication 600 MILLIGRAM(S): at 19:06

## 2018-05-17 RX ADMIN — ATORVASTATIN CALCIUM 40 MILLIGRAM(S): 80 TABLET, FILM COATED ORAL at 21:09

## 2018-05-17 RX ADMIN — ENOXAPARIN SODIUM 40 MILLIGRAM(S): 100 INJECTION SUBCUTANEOUS at 12:06

## 2018-05-17 RX ADMIN — Medication 650 MILLIGRAM(S): at 16:51

## 2018-05-17 NOTE — PROGRESS NOTE ADULT - ASSESSMENT
63 yo F with h/o ETOH here with pontine hemorrhage, now s/p trach/PEG.        Problem/Plan - 1:  ·  Problem: Haemophilus influenzae infection.  Plan: +sputum cultures  c/w levaquin      Problem/Plan - 2:  ·  Problem: Pontine hemorrhage.  Plan: No antiplatelets as per neuro  No neurosurgical intervention  C/w lipitor.      Problem/Plan - 3:  ·  Problem: Alcohol abuse.  Plan: c/w thiamine and folic acid.      Problem/Plan - 4:  ·  Problem: Essential hypertension.  Plan: normotensive  c/w amlodipine and lisinopril.      Problem/Plan - 5:  ·  Problem: Anemia.  Plan: Hgb stable  s/p 1u PRBC.      Problem/Plan - 6:  Problem: Prophylactic measure. lovenox sub q

## 2018-05-17 NOTE — PROGRESS NOTE ADULT - SUBJECTIVE AND OBJECTIVE BOX
FAUSTO BARRETO    [id number]    63y      Female    INTERVAL HPI/OVERNIGHT EVENTS:    Hospital course;  Patient is a 62F with a history of alcoholism, who presented with dizziness and then collapsed at home. Upon arrival in the ER she was obtunded and intubated for airway support. He BP was noted to be elevated. 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 They was no evidence of aneurysm or dissection noted on CTA. Her BP was controlled on IV Cardene. Neurologically, she was unresponsive, her pupils were small and nonreactive, there was a gag reflex, and she withdrew her legs and left hand to pain. Neurosurgery was consulted and confirmed there was no surgical or non-invasive intervention which could be offered.  Patient also seen by neuro and palliative in consult. On 5/10, pt had PEG and trach, then was started on keflex for tracheitis. Pt was downgraded on 5/11 to medicine service. Failed two trials of void, necessitating bush. Remained with low grade fever. Blood cultures negative. Sputum cultures with haemophilus influenzae. Transitioned from keflex to levaquin.     patient seen at bedside and appears to be slightly more awake, able to shake and nod her head    REVIEW OF SYSTEMS:    unable to obtain secondary to mental status       Vital Signs Last 24 Hrs  T(C): 37.7 (17 May 2018 08:00), Max: 38.2 (17 May 2018 05:10)  T(F): 99.8 (17 May 2018 08:00), Max: 100.7 (17 May 2018 05:10)  HR: 82 (17 May 2018 11:48) (66 - 90)  BP: 120/66 (17 May 2018 00:05) (120/66 - 144/69)  BP(mean): 84 (16 May 2018 16:00) (84 - 99)  RR: 20 (17 May 2018 00:05) (16 - 22)  SpO2: 100% (17 May 2018 11:48) (99% - 100%)    PHYSICAL EXAM:    GENERAL: NAD   HEENT: MMM  CHEST/LUNG: Coarse breath sounds  HEART: S1S2+, Regular rate and rhythm  ABDOMEN: Soft, Nontender, Nondistended; Bowel sounds present  EXTREMITIES:  no edema       LABS:                        9.5    10.5  )-----------( 378      ( 17 May 2018 07:55 )             32.2     05-17    142  |  101  |  11.0  ----------------------------<  131<H>  4.3   |  30.0<H>  |  0.51    Ca    9.7      17 May 2018 07:55  Mg     2.0     05-17              MEDICATIONS  (STANDING):  amLODIPine   Tablet 10 milliGRAM(s) Oral daily  atorvastatin 40 milliGRAM(s) Oral at bedtime  bisacodyl Suppository 10 milliGRAM(s) Rectal daily  chlorhexidine 0.12% Liquid 15 milliLiter(s) Swish and Spit two times a day  enoxaparin Injectable 40 milliGRAM(s) SubCutaneous daily  folic acid 1 milliGRAM(s) Oral daily  levoFLOXacin  Tablet 750 milliGRAM(s) Oral every 24 hours  lisinopril 5 milliGRAM(s) Oral daily  ofloxacin 0.3% Solution 1 Drop(s) Both EYES three times a day  pantoprazole   Suspension 40 milliGRAM(s) Oral before lunch  senna 2 Tablet(s) Oral at bedtime  tamsulosin 0.4 milliGRAM(s) Oral at bedtime  thiamine 100 milliGRAM(s) Oral daily    MEDICATIONS  (PRN):  acetaminophen    Suspension 650 milliGRAM(s) Oral every 6 hours PRN For Temp greater than 38 C (100.4 F)  hydrALAZINE Injectable 20 milliGRAM(s) IV Push every 6 hours PRN systolic > 150  lactulose Syrup 20 Gram(s) Oral every 6 hours PRN Constpation  polyethylene glycol 3350 17 Gram(s) Oral daily PRN Constipation      RADIOLOGY & ADDITIONAL TESTS:

## 2018-05-17 NOTE — CHART NOTE - NSCHARTNOTEFT_GEN_A_CORE
Called by RN to assess pt with bilateral eye d/c.  Pt vented, unable to give hx.      EYES - PERRL / Injected sclera L>R, edema, crusty white discharge noted both eyes    Bilateral Conjunctivitis     Ofloxacin 1 drop OU TID for 7 days   Reevaluate for improvement

## 2018-05-17 NOTE — PROGRESS NOTE ADULT - SUBJECTIVE AND OBJECTIVE BOX
PULMONARY PROGRESS NOTE      MALVIN BARRETO-[id number]    Patient is a 63y old  Female who presents with a chief complaint of complained of dizzyness and collapsed at home (23 Apr 2018 10:59)      INTERVAL HPI/OVERNIGHT EVENTS: Opens eyes but nonverbal, noncommunicative. On vent via trach; currently PSV/PEEP.    MEDICATIONS  (STANDING):  amLODIPine   Tablet 10 milliGRAM(s) Oral daily  atorvastatin 40 milliGRAM(s) Oral at bedtime  bisacodyl Suppository 10 milliGRAM(s) Rectal daily  chlorhexidine 0.12% Liquid 15 milliLiter(s) Swish and Spit two times a day  enoxaparin Injectable 40 milliGRAM(s) SubCutaneous daily  folic acid 1 milliGRAM(s) Oral daily  levoFLOXacin  Tablet 750 milliGRAM(s) Oral every 24 hours  lisinopril 5 milliGRAM(s) Oral daily  ofloxacin 0.3% Solution 1 Drop(s) Both EYES three times a day  pantoprazole   Suspension 40 milliGRAM(s) Oral before lunch  senna 2 Tablet(s) Oral at bedtime  tamsulosin 0.4 milliGRAM(s) Oral at bedtime  thiamine 100 milliGRAM(s) Oral daily      MEDICATIONS  (PRN):  acetaminophen    Suspension 650 milliGRAM(s) Oral every 6 hours PRN For Temp greater than 38 C (100.4 F)  hydrALAZINE Injectable 20 milliGRAM(s) IV Push every 6 hours PRN systolic > 150  lactulose Syrup 20 Gram(s) Oral every 6 hours PRN Constpation  polyethylene glycol 3350 17 Gram(s) Oral daily PRN Constipation      Allergies    No Known Allergies    Intolerances        PAST MEDICAL & SURGICAL HISTORY:  Alcohol abuse  No significant past surgical history             REVIEW OF SYSTEMS:    unavailable    Vital Signs Last 24 Hrs  T(C): 37.7 (17 May 2018 08:00), Max: 38.2 (17 May 2018 05:10)  T(F): 99.8 (17 May 2018 08:00), Max: 100.7 (17 May 2018 05:10)  HR: 90 (17 May 2018 08:43) (66 - 90)  BP: 120/66 (17 May 2018 00:05) (109/60 - 144/69)  BP(mean): 84 (16 May 2018 16:00) (79 - 99)  RR: 20 (17 May 2018 00:05) (16 - 39)  SpO2: 100% (17 May 2018 08:43) (99% - 100%)    PHYSICAL EXAMINATION:    GENERAL: The patient is no apparent distress.     HEENT: Mucous membranes are moist.    NECK: trach    LUNGS: Clear to auscultation without wheezing, rales or rhonchi; respirations unlabored    HEART: Regular rate and rhythm      ABDOMEN: PEG.      EXTREMITIES: Without any cyanosis, clubbing, rash, lesions or edema.    NEUROLOGIC: Grossly intact.    LABS:                        9.5    10.5  )-----------( 378      ( 17 May 2018 07:55 )             32.2     05-17    142  |  101  |  11.0  ----------------------------<  131<H>  4.3   |  30.0<H>  |  0.51    Ca    9.7      17 May 2018 07:55  Mg     2.0     05-17         RADIOLOGY & ADDITIONAL STUDIES:  < from: Xray Chest 1 View- PORTABLE-Routine (05.12.18 @ 15:59) >   EXAM:  XR CHEST PORTABLE ROUTINE 1V                          PROCEDURE DATE:  05/12/2018          INTERPRETATION:  Clinical information: Fever.    Portable AP radiograph of the chest was performed.    Comparison: 5/7/2018    There has been tracheostomy.    Lungs are clear and heart is normal.    Osseous structures are unremarkable.      Impression:    No acute pulmonary disease.                        ELIDIA GARCIA M.D., ATTENDING RADIOLOGIST    < end of copied text >

## 2018-05-17 NOTE — PROGRESS NOTE ADULT - ASSESSMENT
-Vent dep resp failure; required mechanical vent due to pontine stroke; currently undergoing PSV/CPAP trials. Not tolerating current settings of 5/5. RSBI to high.  -Pontine hem  -Temp; since May 6 has been having various degrees of fever. CXR on 5/7 no pna.     RECC:    Increase PS back to 10. Continue CPAP trials as tolerates but poor weaning prognosis.  D/W RT. DVT prophylaxis.     Overall prognosis very poor.

## 2018-05-18 ENCOUNTER — TRANSCRIPTION ENCOUNTER (OUTPATIENT)
Age: 63
End: 2018-05-18

## 2018-05-18 LAB
ANION GAP SERPL CALC-SCNC: 12 MMOL/L — SIGNIFICANT CHANGE UP (ref 5–17)
BUN SERPL-MCNC: 12 MG/DL — SIGNIFICANT CHANGE UP (ref 8–20)
CALCIUM SERPL-MCNC: 9.6 MG/DL — SIGNIFICANT CHANGE UP (ref 8.6–10.2)
CHLORIDE SERPL-SCNC: 98 MMOL/L — SIGNIFICANT CHANGE UP (ref 98–107)
CO2 SERPL-SCNC: 29 MMOL/L — SIGNIFICANT CHANGE UP (ref 22–29)
CREAT SERPL-MCNC: 0.47 MG/DL — LOW (ref 0.5–1.3)
CULTURE RESULTS: SIGNIFICANT CHANGE UP
CULTURE RESULTS: SIGNIFICANT CHANGE UP
GLUCOSE SERPL-MCNC: 131 MG/DL — HIGH (ref 70–115)
HCT VFR BLD CALC: 32.1 % — LOW (ref 37–47)
HGB BLD-MCNC: 9.6 G/DL — LOW (ref 12–16)
MAGNESIUM SERPL-MCNC: 2 MG/DL — SIGNIFICANT CHANGE UP (ref 1.6–2.6)
MCHC RBC-ENTMCNC: 29.9 G/DL — LOW (ref 32–36)
MCHC RBC-ENTMCNC: 29.9 PG — SIGNIFICANT CHANGE UP (ref 27–31)
MCV RBC AUTO: 100 FL — HIGH (ref 81–99)
PLATELET # BLD AUTO: 356 K/UL — SIGNIFICANT CHANGE UP (ref 150–400)
POTASSIUM SERPL-MCNC: 4 MMOL/L — SIGNIFICANT CHANGE UP (ref 3.5–5.3)
POTASSIUM SERPL-SCNC: 4 MMOL/L — SIGNIFICANT CHANGE UP (ref 3.5–5.3)
RBC # BLD: 3.21 M/UL — LOW (ref 4.4–5.2)
RBC # FLD: 14.9 % — SIGNIFICANT CHANGE UP (ref 11–15.6)
SODIUM SERPL-SCNC: 139 MMOL/L — SIGNIFICANT CHANGE UP (ref 135–145)
SPECIMEN SOURCE: SIGNIFICANT CHANGE UP
SPECIMEN SOURCE: SIGNIFICANT CHANGE UP
WBC # BLD: 9.8 K/UL — SIGNIFICANT CHANGE UP (ref 4.8–10.8)
WBC # FLD AUTO: 9.8 K/UL — SIGNIFICANT CHANGE UP (ref 4.8–10.8)

## 2018-05-18 PROCEDURE — 99233 SBSQ HOSP IP/OBS HIGH 50: CPT

## 2018-05-18 PROCEDURE — 99232 SBSQ HOSP IP/OBS MODERATE 35: CPT

## 2018-05-18 RX ORDER — ENOXAPARIN SODIUM 100 MG/ML
40 INJECTION SUBCUTANEOUS
Qty: 0 | Refills: 0 | COMMUNITY
Start: 2018-05-18

## 2018-05-18 RX ORDER — THIAMINE MONONITRATE (VIT B1) 100 MG
1 TABLET ORAL
Qty: 0 | Refills: 0 | COMMUNITY
Start: 2018-05-18

## 2018-05-18 RX ORDER — OFLOXACIN 0.3 %
1 DROPS OPHTHALMIC (EYE)
Qty: 0 | Refills: 0 | COMMUNITY
Start: 2018-05-18

## 2018-05-18 RX ORDER — TAMSULOSIN HYDROCHLORIDE 0.4 MG/1
1 CAPSULE ORAL
Qty: 0 | Refills: 0 | COMMUNITY
Start: 2018-05-18

## 2018-05-18 RX ORDER — POLYETHYLENE GLYCOL 3350 17 G/17G
17 POWDER, FOR SOLUTION ORAL
Qty: 0 | Refills: 0 | COMMUNITY
Start: 2018-05-18

## 2018-05-18 RX ORDER — CIPROFLOXACIN LACTATE 400MG/40ML
1 VIAL (ML) INTRAVENOUS
Qty: 0 | Refills: 0 | COMMUNITY
Start: 2018-05-18

## 2018-05-18 RX ORDER — PANTOPRAZOLE SODIUM 20 MG/1
1 TABLET, DELAYED RELEASE ORAL
Qty: 0 | Refills: 0 | COMMUNITY
Start: 2018-05-18

## 2018-05-18 RX ORDER — FOLIC ACID 0.8 MG
1 TABLET ORAL
Qty: 0 | Refills: 0 | COMMUNITY
Start: 2018-05-18

## 2018-05-18 RX ORDER — CHLORHEXIDINE GLUCONATE 213 G/1000ML
1 SOLUTION TOPICAL
Qty: 0 | Refills: 0 | COMMUNITY
Start: 2018-05-18

## 2018-05-18 RX ORDER — AMLODIPINE BESYLATE 2.5 MG/1
1 TABLET ORAL
Qty: 0 | Refills: 0 | COMMUNITY
Start: 2018-05-18

## 2018-05-18 RX ORDER — ATORVASTATIN CALCIUM 80 MG/1
1 TABLET, FILM COATED ORAL
Qty: 0 | Refills: 0 | COMMUNITY
Start: 2018-05-18

## 2018-05-18 RX ADMIN — CHLORHEXIDINE GLUCONATE 15 MILLILITER(S): 213 SOLUTION TOPICAL at 05:27

## 2018-05-18 RX ADMIN — AMLODIPINE BESYLATE 10 MILLIGRAM(S): 2.5 TABLET ORAL at 05:27

## 2018-05-18 RX ADMIN — Medication 1 DROP(S): at 14:55

## 2018-05-18 RX ADMIN — Medication 1 DROP(S): at 21:52

## 2018-05-18 RX ADMIN — ATORVASTATIN CALCIUM 40 MILLIGRAM(S): 80 TABLET, FILM COATED ORAL at 21:52

## 2018-05-18 RX ADMIN — PANTOPRAZOLE SODIUM 40 MILLIGRAM(S): 20 TABLET, DELAYED RELEASE ORAL at 12:05

## 2018-05-18 RX ADMIN — Medication 1 DROP(S): at 05:27

## 2018-05-18 RX ADMIN — Medication 10 MILLIGRAM(S): at 12:05

## 2018-05-18 RX ADMIN — Medication 1 MILLIGRAM(S): at 12:04

## 2018-05-18 RX ADMIN — ENOXAPARIN SODIUM 40 MILLIGRAM(S): 100 INJECTION SUBCUTANEOUS at 12:05

## 2018-05-18 RX ADMIN — Medication 100 MILLIGRAM(S): at 12:04

## 2018-05-18 RX ADMIN — CHLORHEXIDINE GLUCONATE 15 MILLILITER(S): 213 SOLUTION TOPICAL at 17:44

## 2018-05-18 RX ADMIN — SENNA PLUS 2 TABLET(S): 8.6 TABLET ORAL at 21:52

## 2018-05-18 NOTE — PROGRESS NOTE ADULT - SUBJECTIVE AND OBJECTIVE BOX
PULMONARY PROGRESS NOTE      MALVIN BARRETO-[id number]    Patient is a 63y old  Female who presents with a chief complaint of cva (18 May 2018 12:12)      INTERVAL HPI/OVERNIGHT EVENTS:  Currently on PSV 10>VT average 300 cc  Maintaining sats at 100%    MEDICATIONS  (STANDING):  amLODIPine   Tablet 10 milliGRAM(s) Oral daily  atorvastatin 40 milliGRAM(s) Oral at bedtime  bisacodyl Suppository 10 milliGRAM(s) Rectal daily  chlorhexidine 0.12% Liquid 15 milliLiter(s) Swish and Spit two times a day  enoxaparin Injectable 40 milliGRAM(s) SubCutaneous daily  folic acid 1 milliGRAM(s) Oral daily  levoFLOXacin  Tablet 750 milliGRAM(s) Oral every 24 hours  ofloxacin 0.3% Solution 1 Drop(s) Both EYES three times a day  pantoprazole   Suspension 40 milliGRAM(s) Oral before lunch  senna 2 Tablet(s) Oral at bedtime  tamsulosin 0.4 milliGRAM(s) Oral at bedtime  thiamine 100 milliGRAM(s) Oral daily      MEDICATIONS  (PRN):  acetaminophen    Suspension 650 milliGRAM(s) Oral every 6 hours PRN For Temp greater than 38 C (100.4 F)  hydrALAZINE Injectable 20 milliGRAM(s) IV Push every 6 hours PRN systolic > 150  lactulose Syrup 20 Gram(s) Oral every 6 hours PRN Constpation  polyethylene glycol 3350 17 Gram(s) Oral daily PRN Constipation      Allergies    No Known Allergies    Intolerances        PAST MEDICAL & SURGICAL HISTORY:  Alcohol abuse  No significant past surgical history      SOCIAL HISTORY  Smoking History:       REVIEW OF SYSTEMS:    CONSTITUTIONAL:  No distress    HEENT:  Eyes:  No diplopia or blurred vision. ENT:  No earache, sore throat or runny nose.    CARDIOVASCULAR:  No pressure, squeezing, tightness, heaviness or aching about the chest; no palpitations.    RESPIRATORY:  On vent    GASTROINTESTINAL:  No nausea, vomiting or diarrhea.    GENITOURINARY:  No dysuria, frequency or urgency.    MUSCULOSKELETAL:  No joint pain    SKIN:  No new lesions.    NEUROLOGIC:  Post pontine stroke.    PSYCHIATRIC:  No disorder of thought or mood.    ENDOCRINE:  No heat or cold intolerance, polyuria or polydipsia.    HEMATOLOGICAL:  No easy bruising or bleeding.     Vital Signs Last 24 Hrs  T(C): 37.6 (18 May 2018 12:32), Max: 38.8 (17 May 2018 18:50)  T(F): 99.6 (18 May 2018 12:32), Max: 101.9 (17 May 2018 18:50)  HR: 78 (18 May 2018 12:50) (66 - 84)  BP: 117/71 (18 May 2018 08:18) (117/71 - 149/81)  BP(mean): --  RR: 20 (18 May 2018 08:18) (20 - 28)  SpO2: 100% (18 May 2018 12:50) (97% - 100%)    PHYSICAL EXAMINATION:    GENERAL: The patient is awake and alert in no apparent distress.     HEENT: Head is normocephalic and atraumatic. Extraocular muscles are intact. Mucous membranes are moist.    NECK: Supple.    LUNGS: Clear to auscultation without wheezing, rales or rhonchi; respirations unlabored    HEART: Regular rate and rhythm without murmur.    ABDOMEN: Soft, nontender, and nondistended.      EXTREMITIES: Without any cyanosis, clubbing, rash, lesions or edema.    NEUROLOGIC:Responsive but paraplegic    SKIN: No ulceration or induration present.      LABS:                        9.6    9.8   )-----------( 356      ( 18 May 2018 08:25 )             32.1     05-18    139  |  98  |  12.0  ----------------------------<  131<H>  4.0   |  29.0  |  0.47<L>    Ca    9.6      18 May 2018 08:25  Mg     2.0     05-18                          MICROBIOLOGY:    RADIOLOGY & ADDITIONAL STUDIES:

## 2018-05-18 NOTE — PROGRESS NOTE ADULT - ASSESSMENT
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     1) ICH ( intracranial hemorrhage )  Avoid antiplatelet.   Continue blood pressure control.   Supportive care/vent management.  Overall prognosis remains guarded although she is improved from last week.   She is s/p tracheostomy/PEG.    2) Altered mental status  Secondary to intracranial hemorrhage.     3) Hypertension  Continue blood pressure control.     4) will eventually need LTC in vent capable facility vs withdrawal of ventilatory support.    will follow with you    Anirudh Abarca MD PhD   [phone]

## 2018-05-18 NOTE — PROGRESS NOTE ADULT - ASSESSMENT
Tolerating spontaneous respiration but at high PSV>10.    Plan:  Wean as able  Palliative issues  Prognosis poor.

## 2018-05-18 NOTE — DISCHARGE NOTE ADULT - PATIENT PORTAL LINK FT
You can access the [facility] Patient Portal, offered by [facility], by registering with the following website: [email or web address]

## 2018-05-18 NOTE — PROGRESS NOTE ADULT - SUBJECTIVE AND OBJECTIVE BOX
[facility]                                        [facility] [place]                                 Rima Whiteside, & Saad                                  [address]                                        [place], [place], [address]                                             [phone]        CC: Pontine intracranial hemorrhage.  HPI:  63y Female who c/o dizziness and then collapsed at home.  She had poor mental status and was intubated in the ED. There was no associated seizure activity.   She was found to have a large brainstem intracranial hemorrhage on CT.     Interim history:  She is now status post tracheostomy and PEG.   She is able to follow simple commands and answer yes/no questions.  She is out of MICU and on trach/vent floor    ROS:   Denies headache or dizziness.    MEDICATIONS  (STANDING):  amLODIPine   Tablet 10 milliGRAM(s) Oral daily  atorvastatin 40 milliGRAM(s) Oral at bedtime  bisacodyl Suppository 10 milliGRAM(s) Rectal daily  chlorhexidine 0.12% Liquid 15 milliLiter(s) Swish and Spit two times a day  enoxaparin Injectable 40 milliGRAM(s) SubCutaneous daily  folic acid 1 milliGRAM(s) Oral daily  levoFLOXacin  Tablet 750 milliGRAM(s) Oral every 24 hours  ofloxacin 0.3% Solution 1 Drop(s) Both EYES three times a day  pantoprazole   Suspension 40 milliGRAM(s) Oral before lunch  senna 2 Tablet(s) Oral at bedtime  tamsulosin 0.4 milliGRAM(s) Oral at bedtime  thiamine 100 milliGRAM(s) Oral daily    MEDICATIONS  (PRN):  acetaminophen    Suspension 650 milliGRAM(s) Oral every 6 hours PRN For Temp greater than 38 C (100.4 F)  hydrALAZINE Injectable 20 milliGRAM(s) IV Push every 6 hours PRN systolic > 150  lactulose Syrup 20 Gram(s) Oral every 6 hours PRN Constpation  polyethylene glycol 3350 17 Gram(s) Oral daily PRN Constipation    Vital Signs Last 24 Hrs  T(C): 37.6 (18 May 2018 12:32), Max: 38.8 (17 May 2018 18:50)  T(F): 99.6 (18 May 2018 12:32), Max: 101.9 (17 May 2018 18:50)  HR: 78 (18 May 2018 12:50) (66 - 84)  BP: 117/71 (18 May 2018 08:18) (117/71 - 149/81)  BP(mean): --  RR: 20 (18 May 2018 08:18) (20 - 28)  SpO2: 100% (18 May 2018 12:50) (97% - 100%)    Detailed Neurologic Exam:    Mental status: Eyes open, following instructions, attempting to mouth words  vertical tracking with gaze only.     Cranial nerves: Pupils pinpoint. There is no blink to threat. There is roving eye movement in the vertical plane. Corneal reflexes absent. shoulder shrug, palate and tongue cannot be assessed.     Motor/Sensory:  There is normal bulk and tone.  There is no tremor.  There is minimal extensor posturing of the right arm to stimuli.  Squeezes hand and shows fingers on both hands  Wiggles toes to request.    Reflexes: Trace throughout and plantar responses are extensor bilaterally.    Cerebellar: Cannot be tested.    Labs:                           10.3   13.3  )-----------( 429      ( 16 May 2018 06:11 )             33.3     05-16    141  |  99  |  9.0  ----------------------------<  135<H>  4.5   |  31.0<H>  |  0.40<L>    Ca    9.7      16 May 2018 06:11  Mg     2.0     05-16      rad:  Recent neurological studies: none

## 2018-05-18 NOTE — PROGRESS NOTE ADULT - ATTENDING COMMENTS
Thank you for the opportunity to assist with the care of this patient.   [facility] [phone].

## 2018-05-18 NOTE — DISCHARGE NOTE ADULT - HOSPITAL COURSE
Patient is a 62F with a history of alcoholism, who presented with dizziness and then collapsed at home. Upon arrival in the ER she was obtunded and intubated for airway support. He BP was noted to be elevated. 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 They was no evidence of aneurysm or dissection noted on CTA. Her BP was controlled on IV Cardene. Neurologically, she was unresponsive, her pupils were small and nonreactive, there was a gag reflex, and she withdrew her legs and left hand to pain. Neurosurgery was consulted and confirmed there was no surgical or non-invasive intervention which could be offered.  Patient also seen by neuro and palliative in consult. On 5/10, pt had PEG and trach, then was started on keflex for tracheitis. Pt was downgraded on 5/11 to medicine service. Failed two trials of void, necessitating bush. Remained with low grade fever. Blood cultures negative. Sputum cultures with haemophilus influenzae. Transitioned from keflex to levaquin.     patient showing slight improvement in mental status. palliative following patient as well. Patient is now medically stable for dc to [facility] with vent, peg and bush in place.     time spent on dc 32 minutes Patient is a 62F with a history of alcoholism, who presented with dizziness and then collapsed at home. Upon arrival in the ER she was obtunded and intubated for airway support. He BP was noted to be elevated. 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 They was no evidence of aneurysm or dissection noted on CTA. Her BP was controlled on IV Cardene. Neurologically, she was unresponsive, her pupils were small and nonreactive, there was a gag reflex, and she withdrew her legs and left hand to pain. Neurosurgery was consulted and confirmed there was no surgical or non-invasive intervention which could be offered.  Patient also seen by neuro and palliative in consult. On 5/10, pt had PEG and trach, then was started on keflex for tracheitis. Pt was downgraded on 5/11 to medicine service. Failed two trials of void, necessitating bush. Remained with low grade fever. Blood cultures negative. Sputum cultures with haemophilus influenzae. Transitioned from keflex to levaquin.     patient showing slight improvement in mental status. palliative following patient as well. Patient is now medically stable for dc to [facility] with vent, peg and bush in place. patient had a seizure on 5/20 and placed on keppra it was wtinessed    time spent on dc 32 minutes Patient is a 62F with a history of alcoholism, who presented with dizziness and then collapsed at home. Upon arrival in the ER she was obtunded and intubated for airway support. He BP was noted to be elevated. 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 They was no evidence of aneurysm or dissection noted on CTA. Her BP was controlled on IV Cardene. Neurologically, she was unresponsive, her pupils were small and nonreactive, there was a gag reflex, and she withdrew her legs and left hand to pain. Neurosurgery was consulted and confirmed there was no surgical or non-invasive intervention which could be offered.  Patient also seen by neuro and palliative in consult. On 5/10, pt had PEG and trach, then was started on keflex for tracheitis. Pt was downgraded on 5/11 to medicine service. Failed two trials of void, necessitating bush. Remained with low grade fever. Blood cultures negative. Sputum cultures with haemophilus influenzae. Transitioned from keflex to levaquin.     patient showing slight improvement in mental status. palliative following patient as well. Patient is now medically stable for dc to [facility] with vent, peg and bush in place. patient had a seizure on 5/20 and placed on keppra,     time spent on dc 32 minutes Patient is a 62F with a history of alcoholism, who presented with dizziness and then collapsed at home. Upon arrival in the ER she was obtunded and intubated for airway support. He BP was noted to be elevated. 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 They was no evidence of aneurysm or dissection noted on CTA. Her BP was controlled on IV Cardene. Neurologically, she was unresponsive, her pupils were small and nonreactive, there was a gag reflex, and she withdrew her legs and left hand to pain. Neurosurgery was consulted and confirmed there was no surgical or non-invasive intervention which could be offered.  Patient also seen by neuro and palliative in consult. On 5/10, pt had PEG and trach, then was started on keflex for tracheitis. Pt was downgraded on 5/11 to medicine service. Failed two trials of void, necessitating bush. Remained with low grade fever. Blood cultures negative. Sputum cultures with haemophilus influenzae. Transitioned from keflex to levaquin.     patient showing slight improvement in mental status. palliative following patient as well. Patient is now medically stable for dc to [facility] with vent, peg and bush in place. patient had a seizure on 5/20 and placed on keppra, patient awaiting dc to [facility]    time spent on dc 32 minutes

## 2018-05-18 NOTE — PROGRESS NOTE ADULT - PROBLEM SELECTOR PLAN 4
- spoke with  on the phone. He had a lot of questions about " DNR" and about next steps. He still maintains that he is not sure his wife would want to remain this way, on ventilator in a nursing home. He will be speaking to the rest of the family and discussing if they all as a family feel next step is to go to a nursing home, vs. withdrawal of vent and comfort care. He is having a very hard time making these decisions . Ongoing discussions and support.

## 2018-05-18 NOTE — DISCHARGE NOTE ADULT - PROVIDER TOKENS
FREE:[LAST:[primary care],PHONE:[(   )    -],FAX:[(   )    -],ADDRESS:[[facility]]] FREE:[LAST:[primary care],PHONE:[(   )    -],FAX:[(   )    -],ADDRESS:[[facility]]],TOKEN:'[id number]'

## 2018-05-18 NOTE — DISCHARGE NOTE ADULT - SECONDARY DIAGNOSIS.
Acute respiratory failure with hypoxia Alcohol abuse Essential hypertension Fever Hypertensive crisis Urinary retention Seizure

## 2018-05-18 NOTE — PROGRESS NOTE ADULT - SUBJECTIVE AND OBJECTIVE BOX
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     Present Symptoms:     Dyspnea: vented   Nausea/Vomiting: No  Anxiety:  no   Depression: No  Fatigue: Yes   Loss of appetite: unable     Pain: none             Character-            Duration-            Effect-            Factors-            Frequency-            Location-            Severity-    Review of Systems: Reviewed                                    Positive: no chest pain   All others negative    MEDICATIONS  (STANDING):  amLODIPine   Tablet 10 milliGRAM(s) Oral daily  atorvastatin 40 milliGRAM(s) Oral at bedtime  bisacodyl Suppository 10 milliGRAM(s) Rectal daily  chlorhexidine 0.12% Liquid 15 milliLiter(s) Swish and Spit two times a day  enoxaparin Injectable 40 milliGRAM(s) SubCutaneous daily  folic acid 1 milliGRAM(s) Oral daily  levoFLOXacin  Tablet 750 milliGRAM(s) Oral every 24 hours  ofloxacin 0.3% Solution 1 Drop(s) Both EYES three times a day  pantoprazole   Suspension 40 milliGRAM(s) Oral before lunch  senna 2 Tablet(s) Oral at bedtime  tamsulosin 0.4 milliGRAM(s) Oral at bedtime  thiamine 100 milliGRAM(s) Oral daily    MEDICATIONS  (PRN):  acetaminophen    Suspension 650 milliGRAM(s) Oral every 6 hours PRN For Temp greater than 38 C (100.4 F)  hydrALAZINE Injectable 20 milliGRAM(s) IV Push every 6 hours PRN systolic > 150  lactulose Syrup 20 Gram(s) Oral every 6 hours PRN Constipation  polyethylene glycol 3350 17 Gram(s) Oral daily PRN Constipation    PHYSICAL EXAM:    Vital Signs Last 24 Hrs  T(C): 37.3 (18 May 2018 08:18), Max: 38.8 (17 May 2018 18:50)  T(F): 99.2 (18 May 2018 08:18), Max: 101.9 (17 May 2018 18:50)  HR: 76 (18 May 2018 10:47) (66 - 84)  BP: 117/71 (18 May 2018 08:18) (117/71 - 149/81)  BP(mean): --  RR: 20 (18 May 2018 08:18) (20 - 28)  SpO2: 100% (18 May 2018 10:47) (97% - 100%)    General: alert  able to nod yes and no appropriately to questions     Karnofsky:  20--30 %    HEENT: ET tube     Lungs: comfortable     CV: normal      GI: PEG     : Nae    MSK: bedbound; anasarca     Skin: no rash    LABS:                      9.6    9.8   )-----------( 356      ( 18 May 2018 08:25 )             32.1     05-18    139  |  98  |  12.0  ----------------------------<  131<H>  4.0   |  29.0  |  0.47<L>    Ca    9.6      18 May 2018 08:25  Mg     2.0     05-18    I&O's Summary    17 May 2018 07:01  -  18 May 2018 07:00  --------------------------------------------------------  IN: 2180 mL / OUT: 1450 mL / NET: 730 mL    18 May 2018 07:01  -  18 May 2018 11:58  --------------------------------------------------------  IN: 180 mL / OUT: 0 mL / NET: 180 mL    RADIOLOGY & ADDITIONAL STUDIES:    < from: Xray Chest 1 View- PORTABLE-Routine (05.12.18 @ 15:59) >  No acute pulmonary disease.    ADVANCE DIRECTIVES: Full Code

## 2018-05-18 NOTE — PROGRESS NOTE ADULT - SUBJECTIVE AND OBJECTIVE BOX
FAUSTO BARRETO    [id number]    63y      Female    INTERVAL HPI/OVERNIGHT EVENTS:    Hospital course;  Patient is a 62F with a history of alcoholism, who presented with dizziness and then collapsed at home. Upon arrival in the ER she was obtunded and intubated for airway support. He BP was noted to be elevated. 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 They was no evidence of aneurysm or dissection noted on CTA. Her BP was controlled on IV Cardene. Neurologically, she was unresponsive, her pupils were small and nonreactive, there was a gag reflex, and she withdrew her legs and left hand to pain. Neurosurgery was consulted and confirmed there was no surgical or non-invasive intervention which could be offered.  Patient also seen by neuro and palliative in consult. On 5/10, pt had PEG and trach, then was started on keflex for tracheitis. Pt was downgraded on 5/11 to medicine service. Failed two trials of void, necessitating bush. Remained with low grade fever. Blood cultures negative. Sputum cultures with haemophilus influenzae. Transitioned from keflex to levaquin.       REVIEW OF SYSTEMS:    unable to obtain secondary to mental status      Vital Signs Last 24 Hrs  T(C): 37.7 (19 May 2018 07:49), Max: 37.9 (18 May 2018 23:25)  T(F): 99.8 (19 May 2018 07:49), Max: 100.2 (18 May 2018 23:25)  HR: 74 (19 May 2018 12:26) (73 - 85)  BP: 150/78 (19 May 2018 07:49) (150/78 - 177/81)  BP(mean): --  RR: 20 (19 May 2018 07:49) (19 - 20)  SpO2: 100% (19 May 2018 12:26) (100% - 100%)    PHYSICAL EXAM:    GENERAL: NAD   HEENT: MMM  CHEST/LUNG: Coarse breath sounds  HEART: S1S2+, Regular rate and rhythm  ABDOMEN: Soft, Nontender, Nondistended; Bowel sounds present  EXTREMITIES:  no edema       LABS:                        10.5   10.0  )-----------( 386      ( 19 May 2018 10:00 )             33.4     05-19    137  |  98  |  10.0  ----------------------------<  147<H>  3.7   |  27.0  |  0.41<L>    Ca    9.7      19 May 2018 10:00  Mg     1.9     05-19              MEDICATIONS  (STANDING):  amLODIPine   Tablet 10 milliGRAM(s) Oral daily  atorvastatin 40 milliGRAM(s) Oral at bedtime  bisacodyl Suppository 10 milliGRAM(s) Rectal daily  chlorhexidine 0.12% Liquid 15 milliLiter(s) Swish and Spit two times a day  enoxaparin Injectable 40 milliGRAM(s) SubCutaneous daily  folic acid 1 milliGRAM(s) Oral daily  lactobacillus acidophilus 1 Tablet(s) Oral two times a day with meals  levoFLOXacin  Tablet 750 milliGRAM(s) Oral every 24 hours  ofloxacin 0.3% Solution 1 Drop(s) Both EYES three times a day  pantoprazole   Suspension 40 milliGRAM(s) Oral before lunch  senna 2 Tablet(s) Oral at bedtime  thiamine 100 milliGRAM(s) Oral daily    MEDICATIONS  (PRN):  acetaminophen    Suspension 650 milliGRAM(s) Oral every 6 hours PRN For Temp greater than 38 C (100.4 F)  hydrALAZINE Injectable 20 milliGRAM(s) IV Push every 6 hours PRN systolic > 150  lactulose Syrup 20 Gram(s) Oral every 6 hours PRN Constpation  polyethylene glycol 3350 17 Gram(s) Oral daily PRN Constipation      RADIOLOGY & ADDITIONAL TESTS:

## 2018-05-18 NOTE — DISCHARGE NOTE ADULT - CARE PLAN
Principal Discharge DX:	ICH (intracerebral hemorrhage)  Goal:	NO ANTIPLATELETS as per neuro  Assessment and plan of treatment:	statin  Secondary Diagnosis:	Acute respiratory failure with hypoxia  Goal:	s/p trach  Secondary Diagnosis:	Alcohol abuse  Goal:	thiamine  Secondary Diagnosis:	Essential hypertension  Goal:	bp meds  Secondary Diagnosis:	Fever  Goal:	secondary to hemaophilus influneza  Assessment and plan of treatment:	3 more days of abx  Secondary Diagnosis:	Hypertensive crisis  Goal:	resolved  Secondary Diagnosis:	Urinary retention  Goal:	bush  Assessment and plan of treatment:	flomax Principal Discharge DX:	ICH (intracerebral hemorrhage)  Goal:	NO ANTIPLATELETS as per neuro  Assessment and plan of treatment:	statin  Secondary Diagnosis:	Acute respiratory failure with hypoxia  Goal:	s/p trach  Secondary Diagnosis:	Alcohol abuse  Goal:	thiamine  Secondary Diagnosis:	Essential hypertension  Goal:	bp meds  Secondary Diagnosis:	Hypertensive crisis  Goal:	resolved  Secondary Diagnosis:	Seizure  Goal:	keppra

## 2018-05-18 NOTE — DISCHARGE NOTE ADULT - PLAN OF CARE
NO ANTIPLATELETS as per neuro statin s/p trach thiamine bp meds secondary to hemaophilus influneza 3 more days of abx resolved bush flomax keppra

## 2018-05-18 NOTE — DISCHARGE NOTE ADULT - CARE PROVIDER_API CALL
primary care,   [facility]  Phone: (   )    -  Fax: (   )    - primary care,   [facility]  Phone: (   )    -  Fax: (   )    -    Anirudh Abarca (MD; PhD), Neurology; Vascular Neurology  [address] [place] [address]  Phone: [phone]  Fax: [phone]

## 2018-05-18 NOTE — PROGRESS NOTE ADULT - ASSESSMENT
61 yo F with h/o ETOH here with pontine hemorrhage, now s/p trach/PEG.        Problem/Plan - 1:  ·  Problem: Haemophilus influenzae infection.  Plan: +sputum cultures  c/w levaquin      Problem/Plan - 2:  ·  Problem: Pontine hemorrhage.  Plan: No antiplatelets as per neuro  No neurosurgical intervention  C/w lipitor.      Problem/Plan - 3:  ·  Problem: Alcohol abuse.  Plan: c/w thiamine and folic acid.      Problem/Plan - 4:  ·  Problem: Essential hypertension.  Plan: normotensive  c/w amlodipine and lisinopril.      Problem/Plan - 5:  ·  Problem: Anemia.  Plan: Hgb stable  s/p 1u PRBC.      Problem/Plan - 6:  Problem: Prophylactic measure. lovenox sub q     dispo -> dc to [facility]

## 2018-05-18 NOTE — DISCHARGE NOTE ADULT - MEDICATION SUMMARY - MEDICATIONS TO TAKE
I will START or STAY ON the medications listed below when I get home from the hospital:    tamsulosin 0.4 mg oral capsule  -- 1 cap(s) by mouth once a day (at bedtime)  -- Indication: For urinary retention    enoxaparin  -- 40 unit(s) subcutaneous once a day  -- Indication: For Dvt proplylhaxis    atorvastatin 40 mg oral tablet  -- 1 tab(s) by mouth once a day (at bedtime)  -- Indication: For cva    chlorhexidine 0.12% mucous membrane liquid  -- 1 dose(s) mucous membrane 2 times a day  -- Indication: For Tracheitis    amLODIPine 10 mg oral tablet  -- 1 tab(s) by mouth once a day  -- Indication: For Htn    polyethylene glycol 3350 oral powder for reconstitution  -- 17 gram(s) by mouth once a day, As needed, Constipation  -- Indication: For constipation    bisacodyl 10 mg rectal suppository  -- 1 suppository(ies) rectally once a day  -- Indication: For constipation    ofloxacin 0.3% ophthalmic solution  -- 1 drop(s) to each affected eye 3 times a day  -- Indication: For Eye drops    pantoprazole 40 mg oral granule, delayed release  -- 1 tab(s) by gastrostomy tube once a day  -- Indication: For gerd    levoFLOXacin 750 mg oral tablet  -- 1 tab(s) by mouth every 24 hours  3 more days   -- Indication: For Haemophilus influenzae infection    thiamine 100 mg oral tablet  -- 1 tab(s) by mouth once a day  -- Indication: For vitamins    folic acid 1 mg oral tablet  -- 1 tab(s) by mouth once a day  -- Indication: For vitamins I will START or STAY ON the medications listed below when I get home from the hospital:    tamsulosin 0.4 mg oral capsule  -- 1 cap(s) by mouth once a day (at bedtime)  -- Indication: For urinary retention    enoxaparin  -- 40 unit(s) subcutaneous once a day  -- Indication: For Dvt proplylhaxis    Keppra 100 mg/mL oral solution  -- 10 milliliter(s) by mouth 2 times a day   -- Check with your doctor before becoming pregnant.  It is very important that you take or use this exactly as directed.  Do not skip doses or discontinue unless directed by your doctor.  May cause drowsiness or dizziness.  Obtain medical advice before taking any non-prescription drugs as some may affect the action of this medication.  This drug may impair the ability to drive or operate machinery.  Use care until you become familiar with its effects.    -- Indication: For Seizure    atorvastatin 40 mg oral tablet  -- 1 tab(s) by mouth once a day (at bedtime)  -- Indication: For cva    chlorhexidine 0.12% mucous membrane liquid  -- 1 dose(s) mucous membrane 2 times a day  -- Indication: For Tracheitis    amLODIPine 10 mg oral tablet  -- 1 tab(s) by mouth once a day  -- Indication: For Htn    polyethylene glycol 3350 oral powder for reconstitution  -- 17 gram(s) by mouth once a day, As needed, Constipation  -- Indication: For constipation    bisacodyl 10 mg rectal suppository  -- 1 suppository(ies) rectally once a day  -- Indication: For constipation    ofloxacin 0.3% ophthalmic solution  -- 1 drop(s) to each affected eye 3 times a day  -- Indication: For Eye drops    pantoprazole 40 mg oral granule, delayed release  -- 1 tab(s) by gastrostomy tube once a day  -- Indication: For gerd    thiamine 100 mg oral tablet  -- 1 tab(s) by mouth once a day  -- Indication: For vitamins    folic acid 1 mg oral tablet  -- 1 tab(s) by mouth once a day  -- Indication: For vitamins

## 2018-05-19 LAB
ANION GAP SERPL CALC-SCNC: 12 MMOL/L — SIGNIFICANT CHANGE UP (ref 5–17)
BUN SERPL-MCNC: 10 MG/DL — SIGNIFICANT CHANGE UP (ref 8–20)
CALCIUM SERPL-MCNC: 9.7 MG/DL — SIGNIFICANT CHANGE UP (ref 8.6–10.2)
CHLORIDE SERPL-SCNC: 98 MMOL/L — SIGNIFICANT CHANGE UP (ref 98–107)
CO2 SERPL-SCNC: 27 MMOL/L — SIGNIFICANT CHANGE UP (ref 22–29)
CREAT SERPL-MCNC: 0.41 MG/DL — LOW (ref 0.5–1.3)
GLUCOSE SERPL-MCNC: 147 MG/DL — HIGH (ref 70–115)
HCT VFR BLD CALC: 33.4 % — LOW (ref 37–47)
HGB BLD-MCNC: 10.5 G/DL — LOW (ref 12–16)
MAGNESIUM SERPL-MCNC: 1.9 MG/DL — SIGNIFICANT CHANGE UP (ref 1.6–2.6)
MCHC RBC-ENTMCNC: 30.3 PG — SIGNIFICANT CHANGE UP (ref 27–31)
MCHC RBC-ENTMCNC: 31.4 G/DL — LOW (ref 32–36)
MCV RBC AUTO: 96.3 FL — SIGNIFICANT CHANGE UP (ref 81–99)
PLATELET # BLD AUTO: 386 K/UL — SIGNIFICANT CHANGE UP (ref 150–400)
POTASSIUM SERPL-MCNC: 3.7 MMOL/L — SIGNIFICANT CHANGE UP (ref 3.5–5.3)
POTASSIUM SERPL-SCNC: 3.7 MMOL/L — SIGNIFICANT CHANGE UP (ref 3.5–5.3)
RBC # BLD: 3.47 M/UL — LOW (ref 4.4–5.2)
RBC # FLD: 14.5 % — SIGNIFICANT CHANGE UP (ref 11–15.6)
SODIUM SERPL-SCNC: 137 MMOL/L — SIGNIFICANT CHANGE UP (ref 135–145)
WBC # BLD: 10 K/UL — SIGNIFICANT CHANGE UP (ref 4.8–10.8)
WBC # FLD AUTO: 10 K/UL — SIGNIFICANT CHANGE UP (ref 4.8–10.8)

## 2018-05-19 PROCEDURE — 99233 SBSQ HOSP IP/OBS HIGH 50: CPT

## 2018-05-19 RX ORDER — LACTOBACILLUS ACIDOPHILUS 100MM CELL
1 CAPSULE ORAL
Qty: 0 | Refills: 0 | Status: DISCONTINUED | OUTPATIENT
Start: 2018-05-19 | End: 2018-05-25

## 2018-05-19 RX ADMIN — ATORVASTATIN CALCIUM 40 MILLIGRAM(S): 80 TABLET, FILM COATED ORAL at 23:30

## 2018-05-19 RX ADMIN — ENOXAPARIN SODIUM 40 MILLIGRAM(S): 100 INJECTION SUBCUTANEOUS at 12:27

## 2018-05-19 RX ADMIN — Medication 1 TABLET(S): at 17:15

## 2018-05-19 RX ADMIN — SENNA PLUS 2 TABLET(S): 8.6 TABLET ORAL at 21:41

## 2018-05-19 RX ADMIN — CHLORHEXIDINE GLUCONATE 15 MILLILITER(S): 213 SOLUTION TOPICAL at 17:16

## 2018-05-19 RX ADMIN — Medication 1 DROP(S): at 21:41

## 2018-05-19 RX ADMIN — Medication 1 MILLIGRAM(S): at 12:27

## 2018-05-19 RX ADMIN — CHLORHEXIDINE GLUCONATE 15 MILLILITER(S): 213 SOLUTION TOPICAL at 05:13

## 2018-05-19 RX ADMIN — Medication 1 DROP(S): at 13:24

## 2018-05-19 RX ADMIN — PANTOPRAZOLE SODIUM 40 MILLIGRAM(S): 20 TABLET, DELAYED RELEASE ORAL at 12:27

## 2018-05-19 RX ADMIN — Medication 1 DROP(S): at 05:13

## 2018-05-19 RX ADMIN — Medication 10 MILLIGRAM(S): at 12:27

## 2018-05-19 RX ADMIN — Medication 100 MILLIGRAM(S): at 12:27

## 2018-05-19 RX ADMIN — AMLODIPINE BESYLATE 10 MILLIGRAM(S): 2.5 TABLET ORAL at 05:13

## 2018-05-19 RX ADMIN — Medication 650 MILLIGRAM(S): at 15:44

## 2018-05-19 NOTE — PROGRESS NOTE ADULT - SUBJECTIVE AND OBJECTIVE BOX
FAUSTO BARRETO    [id number]    63y      Female    INTERVAL HPI/OVERNIGHT EVENTS:    Hospital course;  Patient is a 62F with a history of alcoholism, who presented with dizziness and then collapsed at home. Upon arrival in the ER she was obtunded and intubated for airway support. He BP was noted to be elevated. 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 They was no evidence of aneurysm or dissection noted on CTA. Her BP was controlled on IV Cardene. Neurologically, she was unresponsive, her pupils were small and nonreactive, there was a gag reflex, and she withdrew her legs and left hand to pain. Neurosurgery was consulted and confirmed there was no surgical or non-invasive intervention which could be offered.  Patient also seen by neuro and palliative in consult. On 5/10, pt had PEG and trach, then was started on keflex for tracheitis. Pt was downgraded on 5/11 to medicine service. Failed two trials of void, necessitating bush. Remained with low grade fever. Blood cultures negative. Sputum cultures with haemophilus influenzae.       REVIEW OF SYSTEMS:    unable to obtain secondary to mental status       Vital Signs Last 24 Hrs  T(C): 37.7 (19 May 2018 07:49), Max: 37.9 (18 May 2018 23:25)  T(F): 99.8 (19 May 2018 07:49), Max: 100.2 (18 May 2018 23:25)  HR: 74 (19 May 2018 12:26) (73 - 85)  BP: 150/78 (19 May 2018 07:49) (150/78 - 177/81)  BP(mean): --  RR: 20 (19 May 2018 07:49) (19 - 20)  SpO2: 100% (19 May 2018 12:26) (100% - 100%)    PHYSICAL EXAM:    GENERAL: NAD, well-groomed  HEENT: PERRL, +EOMI  NECK: soft, Supple, No JVD,   CHEST/LUNG: Clear to auscultation bilaterally; No wheezing  HEART: S1S2+, Regular rate and rhythm; No murmurs, rubs, or gallops  ABDOMEN: Soft, Nontender, Nondistended; Bowel sounds present  EXTREMITIES:  2+ Peripheral Pulses, No clubbing, cyanosis, or edema  SKIN: No rashes or lesions  NEURO: AAOX3, no focal deficits, no motor r sensory loss  PSYCH: normal mood      LABS:                        10.5   10.0  )-----------( 386      ( 19 May 2018 10:00 )             33.4     05-19    137  |  98  |  10.0  ----------------------------<  147<H>  3.7   |  27.0  |  0.41<L>    Ca    9.7      19 May 2018 10:00  Mg     1.9     05-19              MEDICATIONS  (STANDING):  amLODIPine   Tablet 10 milliGRAM(s) Oral daily  atorvastatin 40 milliGRAM(s) Oral at bedtime  bisacodyl Suppository 10 milliGRAM(s) Rectal daily  chlorhexidine 0.12% Liquid 15 milliLiter(s) Swish and Spit two times a day  enoxaparin Injectable 40 milliGRAM(s) SubCutaneous daily  folic acid 1 milliGRAM(s) Oral daily  lactobacillus acidophilus 1 Tablet(s) Oral two times a day with meals  levoFLOXacin  Tablet 750 milliGRAM(s) Oral every 24 hours  ofloxacin 0.3% Solution 1 Drop(s) Both EYES three times a day  pantoprazole   Suspension 40 milliGRAM(s) Oral before lunch  senna 2 Tablet(s) Oral at bedtime  thiamine 100 milliGRAM(s) Oral daily    MEDICATIONS  (PRN):  acetaminophen    Suspension 650 milliGRAM(s) Oral every 6 hours PRN For Temp greater than 38 C (100.4 F)  hydrALAZINE Injectable 20 milliGRAM(s) IV Push every 6 hours PRN systolic > 150  lactulose Syrup 20 Gram(s) Oral every 6 hours PRN Constpation  polyethylene glycol 3350 17 Gram(s) Oral daily PRN Constipation      RADIOLOGY & ADDITIONAL TESTS: FAUSTO BARRETO    [id number]    63y      Female    INTERVAL HPI/OVERNIGHT EVENTS:    Hospital course;  Patient is a 62F with a history of alcoholism, who presented with dizziness and then collapsed at home. Upon arrival in the ER she was obtunded and intubated for airway support. He BP was noted to be elevated. 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 They was no evidence of aneurysm or dissection noted on CTA. Her BP was controlled on IV Cardene. Neurologically, she was unresponsive, her pupils were small and nonreactive, there was a gag reflex, and she withdrew her legs and left hand to pain. Neurosurgery was consulted and confirmed there was no surgical or non-invasive intervention which could be offered.  Patient also seen by neuro and palliative in consult. On 5/10, pt had PEG and trach, then was started on keflex for tracheitis. Pt was downgraded on 5/11 to medicine service. Failed two trials of void, necessitating bush. Remained with low grade fever. Blood cultures negative. Sputum cultures with haemophilus influenzae.       REVIEW OF SYSTEMS:    unable to obtain secondary to mental status       Vital Signs Last 24 Hrs  T(C): 37.7 (19 May 2018 07:49), Max: 37.9 (18 May 2018 23:25)  T(F): 99.8 (19 May 2018 07:49), Max: 100.2 (18 May 2018 23:25)  HR: 74 (19 May 2018 12:26) (73 - 85)  BP: 150/78 (19 May 2018 07:49) (150/78 - 177/81)  BP(mean): --  RR: 20 (19 May 2018 07:49) (19 - 20)  SpO2: 100% (19 May 2018 12:26) (100% - 100%)    PHYSICAL EXAM:    GENERAL: NAD,   HEENT: PERRL, +EOMI  NECK: soft, Supple, No JVD,   CHEST/LUNG: Clear to auscultation bilaterally; No wheezing  HEART: S1S2+, Regular rate and rhythm; No murmurs, rubs, or gallops  ABDOMEN: Soft, Nontender, Nondistended; Bowel sounds present  EXTREMITIES:  2+ Peripheral Pulses, No clubbing, cyanosis, or edema  SKIN: No rashes or lesions  NEURO: Awake      LABS:                        10.5   10.0  )-----------( 386      ( 19 May 2018 10:00 )             33.4     05-19    137  |  98  |  10.0  ----------------------------<  147<H>  3.7   |  27.0  |  0.41<L>    Ca    9.7      19 May 2018 10:00  Mg     1.9     05-19              MEDICATIONS  (STANDING):  amLODIPine   Tablet 10 milliGRAM(s) Oral daily  atorvastatin 40 milliGRAM(s) Oral at bedtime  bisacodyl Suppository 10 milliGRAM(s) Rectal daily  chlorhexidine 0.12% Liquid 15 milliLiter(s) Swish and Spit two times a day  enoxaparin Injectable 40 milliGRAM(s) SubCutaneous daily  folic acid 1 milliGRAM(s) Oral daily  lactobacillus acidophilus 1 Tablet(s) Oral two times a day with meals  levoFLOXacin  Tablet 750 milliGRAM(s) Oral every 24 hours  ofloxacin 0.3% Solution 1 Drop(s) Both EYES three times a day  pantoprazole   Suspension 40 milliGRAM(s) Oral before lunch  senna 2 Tablet(s) Oral at bedtime  thiamine 100 milliGRAM(s) Oral daily    MEDICATIONS  (PRN):  acetaminophen    Suspension 650 milliGRAM(s) Oral every 6 hours PRN For Temp greater than 38 C (100.4 F)  hydrALAZINE Injectable 20 milliGRAM(s) IV Push every 6 hours PRN systolic > 150  lactulose Syrup 20 Gram(s) Oral every 6 hours PRN Constpation  polyethylene glycol 3350 17 Gram(s) Oral daily PRN Constipation      RADIOLOGY & ADDITIONAL TESTS:

## 2018-05-19 NOTE — PROGRESS NOTE ADULT - ASSESSMENT
64 yo F with h/o ETOH here with pontine hemorrhage, now s/p trach/PEG.        Problem/Plan - 1:  ·  Problem: Haemophilus influenzae infection.  Plan: +sputum cultures  c/w levaquin      Problem/Plan - 2:  ·  Problem: Pontine hemorrhage.  Plan: No antiplatelets as per neuro  No neurosurgical intervention  C/w lipitor.      Problem/Plan - 3:  ·  Problem: Alcohol abuse.  Plan: c/w thiamine and folic acid.      Problem/Plan - 4:  ·  Problem: Essential hypertension.  Plan: normotensive  c/w amlodipine and lisinopril.      Problem/Plan - 5:  ·  Problem: Anemia.  Plan: Hgb stable  s/p 1u PRBC.      Problem/Plan - 6:  Problem: Prophylactic measure. lovenox sub q     dispo -> dc to [facility]

## 2018-05-20 LAB
ANION GAP SERPL CALC-SCNC: 10 MMOL/L — SIGNIFICANT CHANGE UP (ref 5–17)
BUN SERPL-MCNC: 10 MG/DL — SIGNIFICANT CHANGE UP (ref 8–20)
CALCIUM SERPL-MCNC: 9.6 MG/DL — SIGNIFICANT CHANGE UP (ref 8.6–10.2)
CHLORIDE SERPL-SCNC: 102 MMOL/L — SIGNIFICANT CHANGE UP (ref 98–107)
CO2 SERPL-SCNC: 27 MMOL/L — SIGNIFICANT CHANGE UP (ref 22–29)
CREAT SERPL-MCNC: 0.45 MG/DL — LOW (ref 0.5–1.3)
GLUCOSE SERPL-MCNC: 142 MG/DL — HIGH (ref 70–115)
HCT VFR BLD CALC: 33.3 % — LOW (ref 37–47)
HGB BLD-MCNC: 10.6 G/DL — LOW (ref 12–16)
MAGNESIUM SERPL-MCNC: 1.9 MG/DL — SIGNIFICANT CHANGE UP (ref 1.6–2.6)
MCHC RBC-ENTMCNC: 30.5 PG — SIGNIFICANT CHANGE UP (ref 27–31)
MCHC RBC-ENTMCNC: 31.8 G/DL — LOW (ref 32–36)
MCV RBC AUTO: 95.7 FL — SIGNIFICANT CHANGE UP (ref 81–99)
PLATELET # BLD AUTO: 365 K/UL — SIGNIFICANT CHANGE UP (ref 150–400)
POTASSIUM SERPL-MCNC: 3.8 MMOL/L — SIGNIFICANT CHANGE UP (ref 3.5–5.3)
POTASSIUM SERPL-SCNC: 3.8 MMOL/L — SIGNIFICANT CHANGE UP (ref 3.5–5.3)
RBC # BLD: 3.48 M/UL — LOW (ref 4.4–5.2)
RBC # FLD: 14.6 % — SIGNIFICANT CHANGE UP (ref 11–15.6)
SODIUM SERPL-SCNC: 139 MMOL/L — SIGNIFICANT CHANGE UP (ref 135–145)
WBC # BLD: 11.8 K/UL — HIGH (ref 4.8–10.8)
WBC # FLD AUTO: 11.8 K/UL — HIGH (ref 4.8–10.8)

## 2018-05-20 PROCEDURE — 99233 SBSQ HOSP IP/OBS HIGH 50: CPT

## 2018-05-20 RX ORDER — ACETAMINOPHEN 500 MG
1000 TABLET ORAL ONCE
Qty: 0 | Refills: 0 | Status: COMPLETED | OUTPATIENT
Start: 2018-05-20 | End: 2018-05-20

## 2018-05-20 RX ADMIN — CHLORHEXIDINE GLUCONATE 15 MILLILITER(S): 213 SOLUTION TOPICAL at 06:32

## 2018-05-20 RX ADMIN — AMLODIPINE BESYLATE 10 MILLIGRAM(S): 2.5 TABLET ORAL at 06:31

## 2018-05-20 RX ADMIN — Medication 1 MILLIGRAM(S): at 14:06

## 2018-05-20 RX ADMIN — Medication 1 DROP(S): at 21:47

## 2018-05-20 RX ADMIN — Medication 1 TABLET(S): at 21:47

## 2018-05-20 RX ADMIN — Medication 1 MILLIGRAM(S): at 21:27

## 2018-05-20 RX ADMIN — POLYETHYLENE GLYCOL 3350 17 GRAM(S): 17 POWDER, FOR SOLUTION ORAL at 14:06

## 2018-05-20 RX ADMIN — Medication 1 DROP(S): at 06:31

## 2018-05-20 RX ADMIN — Medication 100 MILLIGRAM(S): at 14:06

## 2018-05-20 RX ADMIN — Medication 1 DROP(S): at 14:06

## 2018-05-20 RX ADMIN — Medication 400 MILLIGRAM(S): at 09:04

## 2018-05-20 RX ADMIN — CHLORHEXIDINE GLUCONATE 15 MILLILITER(S): 213 SOLUTION TOPICAL at 18:49

## 2018-05-20 RX ADMIN — ENOXAPARIN SODIUM 40 MILLIGRAM(S): 100 INJECTION SUBCUTANEOUS at 14:06

## 2018-05-20 RX ADMIN — SENNA PLUS 2 TABLET(S): 8.6 TABLET ORAL at 21:47

## 2018-05-20 RX ADMIN — PANTOPRAZOLE SODIUM 40 MILLIGRAM(S): 20 TABLET, DELAYED RELEASE ORAL at 14:06

## 2018-05-20 RX ADMIN — Medication 1000 MILLIGRAM(S): at 09:34

## 2018-05-20 RX ADMIN — ATORVASTATIN CALCIUM 40 MILLIGRAM(S): 80 TABLET, FILM COATED ORAL at 21:47

## 2018-05-20 RX ADMIN — Medication 1 TABLET(S): at 14:06

## 2018-05-20 NOTE — CHART NOTE - NSCHARTNOTEFT_GEN_A_CORE
Called by RN pt found to have seizure activity, movement of LUE in upward shoulder direction which is not her normal activity.  Family at bedside.  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 T/99.1   Pt is non-verbal; makes eye contact when called/touched but not tracking  Lungs- clear bilaterally  Heart s1s2 heard, RRR no murmur  abdomen- soft, nontender + bowel sounds    Impression:  1- seizure  Plan: 1- ativan 1mg i.v. x one with resolution of seizure activity          2- d/w family/RN plan of care; all questions answered          3- observe, re-evaluate prn

## 2018-05-20 NOTE — PROGRESS NOTE ADULT - ASSESSMENT
62 yo F with h/o ETOH here with pontine hemorrhage, now s/p trach/PEG.        Problem/Plan - 1:  ·  Problem: Haemophilus influenzae infection.  Plan: +sputum cultures  c/w levaquin      Problem/Plan - 2:  ·  Problem: Pontine hemorrhage.  Plan: No antiplatelets as per neuro  No neurosurgical intervention  C/w lipitor.      Problem/Plan - 3:  ·  Problem: Alcohol abuse.  Plan: c/w thiamine and folic acid.      Problem/Plan - 4:  ·  Problem: Essential hypertension.  Plan: normotensive  c/w amlodipine and lisinopril.      Problem/Plan - 5:  ·  Problem: Anemia.  Plan: Hgb stable  s/p 1u PRBC.      Problem/Plan - 6:  Problem: Prophylactic measure. lovenox sub q     dispo -> dc to [facility]

## 2018-05-20 NOTE — PROGRESS NOTE ADULT - SUBJECTIVE AND OBJECTIVE BOX
FAUSTO BARRETO    [id number]    63y      Female    INTERVAL HPI/OVERNIGHT EVENTS:    Patient is a 62F with a history of alcoholism, who presented with dizziness and then collapsed at home. Upon arrival in the ER she was obtunded and intubated for airway support. He BP was noted to be elevated. 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 They was no evidence of aneurysm or dissection noted on CTA. Her BP was controlled on IV Cardene. Neurologically, she was unresponsive, her pupils were small and nonreactive, there was a gag reflex, and she withdrew her legs and left hand to pain. Neurosurgery was consulted and confirmed there was no surgical or non-invasive intervention which could be offered.  Patient also seen by neuro and palliative in consult. On 5/10, pt had PEG and trach, then was started on keflex for tracheitis. Pt was downgraded on 5/11 to medicine service. Failed two trials of void, necessitating bush. Remained with low grade fever. Blood cultures negative. Sputum cultures with haemophilus influenzae.     patinet seen at bedside with no acute changes      REVIEW OF SYSTEMS:    unable to obtain secondary to mental status     Vital Signs Last 24 Hrs  T(C): 36.9 (20 May 2018 08:14), Max: 38.2 (19 May 2018 15:42)  T(F): 98.5 (20 May 2018 08:14), Max: 100.8 (19 May 2018 15:42)  HR: 72 (20 May 2018 09:38) (67 - 80)  BP: 133/79 (20 May 2018 08:14) (113/62 - 138/72)  BP(mean): --  RR: 20 (20 May 2018 08:14) (20 - 22)  SpO2: 100% (20 May 2018 09:38) (99% - 100%)    PHYSICAL EXAM:    GENERAL: NAD,   NECK: soft, Supple, No JVD,   CHEST/LUNG: rhonhci  HEART: S1S2+, Regular rate and rhythm; No murmurs, rubs, or gallops  ABDOMEN: Soft, Nontender, Nondistended; Bowel sounds present  EXTREMITIES:  2+ Peripheral Pulses, No edema  SKIN: No rashes or lesions  NEURO:awake         LABS:                        10.6   11.8  )-----------( 365      ( 20 May 2018 08:49 )             33.3     05-20    139  |  102  |  10.0  ----------------------------<  142<H>  3.8   |  27.0  |  0.45<L>    Ca    9.6      20 May 2018 08:49  Mg     1.9     05-20              MEDICATIONS  (STANDING):  acetaminophen  IVPB. 1000 milliGRAM(s) IV Intermittent once  amLODIPine   Tablet 10 milliGRAM(s) Oral daily  atorvastatin 40 milliGRAM(s) Oral at bedtime  bisacodyl Suppository 10 milliGRAM(s) Rectal daily  chlorhexidine 0.12% Liquid 15 milliLiter(s) Swish and Spit two times a day  enoxaparin Injectable 40 milliGRAM(s) SubCutaneous daily  folic acid 1 milliGRAM(s) Oral daily  lactobacillus acidophilus 1 Tablet(s) Oral two times a day with meals  levoFLOXacin  Tablet 750 milliGRAM(s) Oral every 24 hours  ofloxacin 0.3% Solution 1 Drop(s) Both EYES three times a day  pantoprazole   Suspension 40 milliGRAM(s) Oral before lunch  senna 2 Tablet(s) Oral at bedtime  thiamine 100 milliGRAM(s) Oral daily    MEDICATIONS  (PRN):  acetaminophen    Suspension 650 milliGRAM(s) Oral every 6 hours PRN For Temp greater than 38 C (100.4 F)  hydrALAZINE Injectable 20 milliGRAM(s) IV Push every 6 hours PRN systolic > 150  lactulose Syrup 20 Gram(s) Oral every 6 hours PRN Constpation  polyethylene glycol 3350 17 Gram(s) Oral daily PRN Constipation      RADIOLOGY & ADDITIONAL TESTS:

## 2018-05-21 PROCEDURE — 99233 SBSQ HOSP IP/OBS HIGH 50: CPT

## 2018-05-21 RX ORDER — LEVETIRACETAM 250 MG/1
1000 TABLET, FILM COATED ORAL
Qty: 0 | Refills: 0 | Status: DISCONTINUED | OUTPATIENT
Start: 2018-05-21 | End: 2018-05-25

## 2018-05-21 RX ORDER — LEVETIRACETAM 250 MG/1
10 TABLET, FILM COATED ORAL
Qty: 20 | Refills: 0 | OUTPATIENT
Start: 2018-05-21 | End: 2018-06-19

## 2018-05-21 RX ADMIN — Medication 1 TABLET(S): at 05:06

## 2018-05-21 RX ADMIN — Medication 1 TABLET(S): at 17:12

## 2018-05-21 RX ADMIN — LEVETIRACETAM 1000 MILLIGRAM(S): 250 TABLET, FILM COATED ORAL at 10:56

## 2018-05-21 RX ADMIN — AMLODIPINE BESYLATE 10 MILLIGRAM(S): 2.5 TABLET ORAL at 05:06

## 2018-05-21 RX ADMIN — Medication 1 DROP(S): at 05:06

## 2018-05-21 RX ADMIN — CHLORHEXIDINE GLUCONATE 15 MILLILITER(S): 213 SOLUTION TOPICAL at 05:06

## 2018-05-21 RX ADMIN — Medication 100 MILLIGRAM(S): at 11:05

## 2018-05-21 RX ADMIN — PANTOPRAZOLE SODIUM 40 MILLIGRAM(S): 20 TABLET, DELAYED RELEASE ORAL at 10:56

## 2018-05-21 RX ADMIN — Medication 1 DROP(S): at 13:23

## 2018-05-21 RX ADMIN — ENOXAPARIN SODIUM 40 MILLIGRAM(S): 100 INJECTION SUBCUTANEOUS at 11:05

## 2018-05-21 RX ADMIN — CHLORHEXIDINE GLUCONATE 15 MILLILITER(S): 213 SOLUTION TOPICAL at 17:12

## 2018-05-21 RX ADMIN — SENNA PLUS 2 TABLET(S): 8.6 TABLET ORAL at 21:22

## 2018-05-21 RX ADMIN — Medication 1 MILLIGRAM(S): at 11:05

## 2018-05-21 RX ADMIN — ATORVASTATIN CALCIUM 40 MILLIGRAM(S): 80 TABLET, FILM COATED ORAL at 21:22

## 2018-05-21 RX ADMIN — Medication 1 DROP(S): at 21:21

## 2018-05-21 RX ADMIN — LEVETIRACETAM 1000 MILLIGRAM(S): 250 TABLET, FILM COATED ORAL at 17:11

## 2018-05-21 NOTE — PROGRESS NOTE ADULT - SUBJECTIVE AND OBJECTIVE BOX
FAUSTO BARRETO    [id number]    63y      Female    INTERVAL HPI/OVERNIGHT EVENTS:    Patient is a 62F with a history of alcoholism, who presented with dizziness and then collapsed at home. Upon arrival in the ER she was obtunded and intubated for airway support. He BP was noted to be elevated. 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 They was no evidence of aneurysm or dissection noted on CTA. Her BP was controlled on IV Cardene. Neurologically, she was unresponsive, her pupils were small and nonreactive, there was a gag reflex, and she withdrew her legs and left hand to pain. Neurosurgery was consulted and confirmed there was no surgical or non-invasive intervention which could be offered.  Patient also seen by neuro and palliative in consult. On 5/10, pt had PEG and trach, then was started on keflex for tracheitis. Pt was downgraded on 5/11 to medicine service. Failed two trials of void, necessitating bush. Remained with low grade fever. Blood cultures negative. Sputum cultures with haemophilus influenzae.     as per nurse patient had an episode of witness seizures and gfiven iv ativan with resolution       REVIEW OF SYSTEMS:    unable to obtain secondary to mental status    Vital Signs Last 24 Hrs  T(C): 36.8 (21 May 2018 23:32), Max: 37.3 (21 May 2018 15:54)  T(F): 98.2 (21 May 2018 23:32), Max: 99.2 (21 May 2018 15:54)  HR: 74 (22 May 2018 09:17) (63 - 77)  BP: 129/75 (21 May 2018 23:32) (110/66 - 129/75)  BP(mean): --  RR: 20 (21 May 2018 23:32) (20 - 20)  SpO2: 100% (22 May 2018 09:19) (100% - 100%)    PHYSICAL EXAM:    GENERAL: NAD,   NECK: soft, Supple, No JVD,   CHEST/LUNG: rhonhci  HEART: S1S2+, Regular rate and rhythm; No murmurs, rubs, or gallops  ABDOMEN: Soft, Nontender, Nondistended; Bowel sounds present  EXTREMITIES:  2+ Peripheral Pulses, No edema  SKIN: No rashes or lesions  NEURO:awake     LABS:      MEDICATIONS  (STANDING):  amLODIPine   Tablet 10 milliGRAM(s) Oral daily  atorvastatin 40 milliGRAM(s) Oral at bedtime  bisacodyl Suppository 10 milliGRAM(s) Rectal daily  chlorhexidine 0.12% Liquid 15 milliLiter(s) Swish and Spit two times a day  enoxaparin Injectable 40 milliGRAM(s) SubCutaneous daily  folic acid 1 milliGRAM(s) Oral daily  lactobacillus acidophilus 1 Tablet(s) Oral two times a day with meals  levETIRAcetam  Solution 1000 milliGRAM(s) Oral two times a day  ofloxacin 0.3% Solution 1 Drop(s) Both EYES three times a day  pantoprazole   Suspension 40 milliGRAM(s) Oral before lunch  senna 2 Tablet(s) Oral at bedtime  thiamine 100 milliGRAM(s) Oral daily    MEDICATIONS  (PRN):  acetaminophen    Suspension 650 milliGRAM(s) Oral every 6 hours PRN For Temp greater than 38 C (100.4 F)  hydrALAZINE Injectable 20 milliGRAM(s) IV Push every 6 hours PRN systolic > 150  lactulose Syrup 20 Gram(s) Oral every 6 hours PRN Constpation  polyethylene glycol 3350 17 Gram(s) Oral daily PRN Constipation      RADIOLOGY & ADDITIONAL TESTS:

## 2018-05-21 NOTE — PROGRESS NOTE ADULT - ASSESSMENT
62 yo F with h/o ETOH here with pontine hemorrhage, now s/p trach/PEG.        Problem/Plan - 1:  ·  Problem: seizure --> add keppra bid  --> follow up with neuro as outrpatient      Problem/Plan - 2:  ·  Problem: Pontine hemorrhage.  Plan: No antiplatelets as per neuro  No neurosurgical intervention  C/w lipitor.      Problem/Plan - 3:  ·  Problem: Alcohol abuse.  Plan: c/w thiamine and folic acid.      Problem/Plan - 4:  ·  Problem: Essential hypertension.  Plan: normotensive  c/w amlodipine and lisinopril.      Problem/Plan - 5:  ·  Problem: Anemia.  Plan: Hgb stable  s/p 1u PRBC.     awaiting dc to [facility]     Problem/Plan - 6:  Problem: Prophylactic measure. lovenox sub q     dispo -> dc to [facility]

## 2018-05-22 PROCEDURE — 99232 SBSQ HOSP IP/OBS MODERATE 35: CPT

## 2018-05-22 PROCEDURE — 99233 SBSQ HOSP IP/OBS HIGH 50: CPT

## 2018-05-22 RX ORDER — TAMSULOSIN HYDROCHLORIDE 0.4 MG/1
0.8 CAPSULE ORAL ONCE
Qty: 0 | Refills: 0 | Status: COMPLETED | OUTPATIENT
Start: 2018-05-22 | End: 2018-05-22

## 2018-05-22 RX ADMIN — Medication 1 DROP(S): at 05:07

## 2018-05-22 RX ADMIN — Medication 100 MILLIGRAM(S): at 12:05

## 2018-05-22 RX ADMIN — CHLORHEXIDINE GLUCONATE 15 MILLILITER(S): 213 SOLUTION TOPICAL at 05:08

## 2018-05-22 RX ADMIN — PANTOPRAZOLE SODIUM 40 MILLIGRAM(S): 20 TABLET, DELAYED RELEASE ORAL at 12:05

## 2018-05-22 RX ADMIN — Medication 1 MILLIGRAM(S): at 12:05

## 2018-05-22 RX ADMIN — LEVETIRACETAM 1000 MILLIGRAM(S): 250 TABLET, FILM COATED ORAL at 17:18

## 2018-05-22 RX ADMIN — AMLODIPINE BESYLATE 10 MILLIGRAM(S): 2.5 TABLET ORAL at 05:08

## 2018-05-22 RX ADMIN — Medication 1 TABLET(S): at 17:18

## 2018-05-22 RX ADMIN — LEVETIRACETAM 1000 MILLIGRAM(S): 250 TABLET, FILM COATED ORAL at 05:07

## 2018-05-22 RX ADMIN — Medication 1 TABLET(S): at 05:08

## 2018-05-22 RX ADMIN — Medication 1 DROP(S): at 22:55

## 2018-05-22 RX ADMIN — CHLORHEXIDINE GLUCONATE 15 MILLILITER(S): 213 SOLUTION TOPICAL at 17:18

## 2018-05-22 RX ADMIN — Medication 10 MILLIGRAM(S): at 12:05

## 2018-05-22 RX ADMIN — ATORVASTATIN CALCIUM 40 MILLIGRAM(S): 80 TABLET, FILM COATED ORAL at 22:55

## 2018-05-22 RX ADMIN — ENOXAPARIN SODIUM 40 MILLIGRAM(S): 100 INJECTION SUBCUTANEOUS at 12:05

## 2018-05-22 RX ADMIN — Medication 1 DROP(S): at 13:28

## 2018-05-22 RX ADMIN — SENNA PLUS 2 TABLET(S): 8.6 TABLET ORAL at 22:55

## 2018-05-22 NOTE — PROGRESS NOTE ADULT - SUBJECTIVE AND OBJECTIVE BOX
FAUSTO BARRETO    [id number]    63y      Female    INTERVAL HPI/OVERNIGHT EVENTS:    Patient is a 62F with a history of alcoholism, who presented with dizziness and then collapsed at home. Upon arrival in the ER she was obtunded and intubated for airway support. He BP was noted to be elevated. 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 They was no evidence of aneurysm or dissection noted on CTA. Her BP was controlled on IV Cardene. Neurologically, she was unresponsive, her pupils were small and nonreactive, there was a gag reflex, and she withdrew her legs and left hand to pain. Neurosurgery was consulted and confirmed there was no surgical or non-invasive intervention which could be offered.  Patient also seen by neuro and palliative in consult. On 5/10, pt had PEG and trach, then was started on keflex for tracheitis. Pt was downgraded on 5/11 to medicine service. Failed two trials of void, necessitating bush. Remained with low grade fever. Blood cultures negative. Sputum cultures with haemophilus influenzae.     as per nurse patient had an episode of witness seizures and given iv ativan with resolution     patient awaiting bed to [facility]      REVIEW OF SYSTEMS:    unable to obtain secondary to mental status    Vital Signs Last 24 Hrs  T(C): 36.8 (23 May 2018 05:00), Max: 37.2 (22 May 2018 09:30)  T(F): 98.3 (23 May 2018 05:00), Max: 99 (22 May 2018 09:30)  HR: 80 (23 May 2018 05:00) (69 - 86)  BP: 132/80 (23 May 2018 05:00) (109/65 - 148/82)  BP(mean): --  RR: 22 (23 May 2018 05:00) (18 - 22)  SpO2: 100% (23 May 2018 05:00) (97% - 100%)    PHYSICAL EXAM:    GENERAL: NAD,   NECK: soft, Supple, No JVD,   CHEST/LUNG: rhonhci  HEART: S1S2+, Regular rate and rhythm; No murmurs, rubs, or gallops  ABDOMEN: Soft, Nontender, Nondistended; Bowel sounds present  EXTREMITIES:  2+ Peripheral Pulses, No edema  SKIN: No rashes or lesions  NEURO:awake         LABS:            MEDICATIONS  (STANDING):  amLODIPine   Tablet 10 milliGRAM(s) Oral daily  atorvastatin 40 milliGRAM(s) Oral at bedtime  bisacodyl Suppository 10 milliGRAM(s) Rectal daily  chlorhexidine 0.12% Liquid 15 milliLiter(s) Swish and Spit two times a day  enoxaparin Injectable 40 milliGRAM(s) SubCutaneous daily  folic acid 1 milliGRAM(s) Oral daily  lactobacillus acidophilus 1 Tablet(s) Oral two times a day with meals  levETIRAcetam  Solution 1000 milliGRAM(s) Oral two times a day  ofloxacin 0.3% Solution 1 Drop(s) Both EYES three times a day  pantoprazole   Suspension 40 milliGRAM(s) Oral before lunch  senna 2 Tablet(s) Oral at bedtime  thiamine 100 milliGRAM(s) Oral daily    MEDICATIONS  (PRN):  acetaminophen    Suspension 650 milliGRAM(s) Oral every 6 hours PRN For Temp greater than 38 C (100.4 F)  hydrALAZINE Injectable 20 milliGRAM(s) IV Push every 6 hours PRN systolic > 150  lactulose Syrup 20 Gram(s) Oral every 6 hours PRN Constpation  polyethylene glycol 3350 17 Gram(s) Oral daily PRN Constipation      RADIOLOGY & ADDITIONAL TESTS:

## 2018-05-22 NOTE — PROGRESS NOTE ADULT - SUBJECTIVE AND OBJECTIVE BOX
PULMONARY PROGRESS NOTE      MALVIN BARRETO-[id number]    Patient is a 63y old  Female who presents with a chief complaint of cva (18 May 2018 12:12)  Unresponsive on vent post large pontine ICH    INTERVAL HPI/OVERNIGHT EVENTS:  Able to tolerate CPAP with High PSV but no real prospects for weaning    MEDICATIONS  (STANDING):  amLODIPine   Tablet 10 milliGRAM(s) Oral daily  atorvastatin 40 milliGRAM(s) Oral at bedtime  bisacodyl Suppository 10 milliGRAM(s) Rectal daily  chlorhexidine 0.12% Liquid 15 milliLiter(s) Swish and Spit two times a day  enoxaparin Injectable 40 milliGRAM(s) SubCutaneous daily  folic acid 1 milliGRAM(s) Oral daily  lactobacillus acidophilus 1 Tablet(s) Oral two times a day with meals  levETIRAcetam  Solution 1000 milliGRAM(s) Oral two times a day  ofloxacin 0.3% Solution 1 Drop(s) Both EYES three times a day  pantoprazole   Suspension 40 milliGRAM(s) Oral before lunch  senna 2 Tablet(s) Oral at bedtime  tamsulosin 0.8 milliGRAM(s) Oral once  thiamine 100 milliGRAM(s) Oral daily      MEDICATIONS  (PRN):  acetaminophen    Suspension 650 milliGRAM(s) Oral every 6 hours PRN For Temp greater than 38 C (100.4 F)  hydrALAZINE Injectable 20 milliGRAM(s) IV Push every 6 hours PRN systolic > 150  lactulose Syrup 20 Gram(s) Oral every 6 hours PRN Constpation  polyethylene glycol 3350 17 Gram(s) Oral daily PRN Constipation      Allergies    No Known Allergies    Intolerances        PAST MEDICAL & SURGICAL HISTORY:  Alcohol abuse  No significant past surgical history      SOCIAL HISTORY  Smoking History:       REVIEW OF SYSTEMS:    Unresponsive    Vital Signs Last 24 Hrs  T(C): 37.2 (22 May 2018 09:30), Max: 37.3 (21 May 2018 15:54)  T(F): 99 (22 May 2018 09:30), Max: 99.2 (21 May 2018 15:54)  HR: 71 (22 May 2018 12:28) (63 - 77)  BP: 109/65 (22 May 2018 09:30) (109/65 - 129/75)  BP(mean): --  RR: 21 (22 May 2018 09:30) (20 - 21)  SpO2: 100% (22 May 2018 12:28) (100% - 100%)    PHYSICAL EXAMINATION:    GENERAL: The patient is essentially comatose in no apparent distress.     HEENT: Head is normocephalic and atraumatic. Extraocular muscles are intact. Mucous membranes are moist.    NECK: Supple.    LUNGS: Clear to auscultation without wheezing, rales or rhonchi; respirations unlabored    HEART: Regular rate and rhythm without murmur.    ABDOMEN: Soft, nontender, and nondistended.      EXTREMITIES: Without any cyanosis, clubbing, rash, lesions or edema.        LABS:      NO recent labs      MICROBIOLOGY: No recent cultures     RADIOLOGY & ADDITIONAL STUDIES:    No recent imaging

## 2018-05-22 NOTE — PROGRESS NOTE ADULT - SUBJECTIVE AND OBJECTIVE BOX
[facility]                                        Ne[facility] [place]                                 Rima Whiteside, & Saad                                  [address]                                        [place], [place], [address]                                             [phone]        CC: Pontine intracranial hemorrhage.    HPI:    63y Female who c/o dizziness and then collapsed at home.  She had poor mental status and was intubated in the ED. There was no associated seizure activity.   She was found to have a large brainstem intracranial hemorrhage on CT.     Interim history:  She is now status post tracheostomy and PEG.   She remains on mechanical ventilator via tracheostomy.  She has been transferred to [address] [place] [address].    ROS:   Unobtainable due to patient's condition.     MEDICATIONS  (STANDING):  amLODIPine   Tablet 10 milliGRAM(s) Oral daily  atorvastatin 40 milliGRAM(s) Oral at bedtime  bisacodyl Suppository 10 milliGRAM(s) Rectal daily  chlorhexidine 0.12% Liquid 15 milliLiter(s) Swish and Spit two times a day  enoxaparin Injectable 40 milliGRAM(s) SubCutaneous daily  folic acid 1 milliGRAM(s) Oral daily  lactobacillus acidophilus 1 Tablet(s) Oral two times a day with meals  levETIRAcetam  Solution 1000 milliGRAM(s) Oral two times a day  ofloxacin 0.3% Solution 1 Drop(s) Both EYES three times a day  pantoprazole   Suspension 40 milliGRAM(s) Oral before lunch  senna 2 Tablet(s) Oral at bedtime  thiamine 100 milliGRAM(s) Oral daily      Vital Signs Last 24 Hrs  T(F): 99 (22 May 2018 09:30), Max: 99.2 (21 May 2018 15:54)  HR: 69 (22 May 2018 09:30) (63 - 77)  BP: 109/65 (22 May 2018 09:30) (109/65 - 129/75)  RR: 21 (22 May 2018 09:30) (20 - 21)  SpO2: 100% (22 May 2018 09:30) (100% - 100%)    Detailed Neurologic Exam:    Mental status: Eyes open, following instructions, attempting to mouth words  vertical tracking with gaze only. Face grossly symmetric.     Cranial nerves: Pupils pinpoint. There is no blink to threat. There is roving eye movement in the vertical plane. Corneal reflexes absent. Shoulder shrug, palate and tongue cannot be assessed.     Motor/Sensory:  There is normal bulk and tone.  There is no tremor.  There is minimal extensor posturing of the right arm to stimuli.  Squeezes hand and shows fingers on both hands  Wiggles toes to request.    Reflexes: Trace throughout and plantar responses are extensor bilaterally.    Cerebellar: Cannot be tested.

## 2018-05-22 NOTE — PROGRESS NOTE ADULT - ASSESSMENT
64 yo F with h/o ETOH here with pontine hemorrhage, now s/p trach/PEG.        Problem/Plan - 1:  ·  Problem: seizure --> keppra bid  --> follow up with neuro as outpatient      Problem/Plan - 2:  ·  Problem: Pontine hemorrhage.  Plan: No antiplatelets as per neuro  No neurosurgical intervention  C/w lipitor.      Problem/Plan - 3:  ·  Problem: Alcohol abuse.  Plan: c/w thiamine and folic acid.      Problem/Plan - 4:  ·  Problem: Essential hypertension.  Plan: normotensive  c/w amlodipine and lisinopril.      Problem/Plan - 5:  ·  Problem: Anemia.  Plan: Hgb stable  s/p 1u PRBC.        Problem/Plan - 6:  Problem: Prophylactic measure. lovenox sub q     dispo -> dc to [facility]

## 2018-05-22 NOTE — PROGRESS NOTE ADULT - ASSESSMENT
Assess    VDRF with no current prospects for successful weaning  Post large ICB essentially with coma and Sz disorder  ETOH  HTN  Anemia      Plan    Full Vent  THONY  Wean as able

## 2018-05-22 NOTE — CHART NOTE - NSCHARTNOTEFT_GEN_A_CORE
trach sutures removed.  per Dr. Gonzalez and Dr. Jose no trach change needed at this time.  please reconsult PRN. thank you

## 2018-05-22 NOTE — PROGRESS NOTE ADULT - ASSESSMENT
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     1) ICH ( intracranial hemorrhage )  Avoid antiplatelet.   Continue blood pressure control.   Supportive care/vent management.  She is s/p tracheostomy/PEG.  Overall prognosis guarded although has been showing very slow improvements.     2) Altered mental status  Secondary to intracranial hemorrhage.     3) Hypertension  Continue blood pressure control.     4) will eventually need LTC in vent capable facility vs withdrawal of ventilatory support.

## 2018-05-23 PROCEDURE — 99233 SBSQ HOSP IP/OBS HIGH 50: CPT

## 2018-05-23 PROCEDURE — 99232 SBSQ HOSP IP/OBS MODERATE 35: CPT

## 2018-05-23 RX ADMIN — LEVETIRACETAM 1000 MILLIGRAM(S): 250 TABLET, FILM COATED ORAL at 18:33

## 2018-05-23 RX ADMIN — Medication 1 DROP(S): at 13:59

## 2018-05-23 RX ADMIN — LEVETIRACETAM 1000 MILLIGRAM(S): 250 TABLET, FILM COATED ORAL at 05:32

## 2018-05-23 RX ADMIN — ENOXAPARIN SODIUM 40 MILLIGRAM(S): 100 INJECTION SUBCUTANEOUS at 13:57

## 2018-05-23 RX ADMIN — CHLORHEXIDINE GLUCONATE 15 MILLILITER(S): 213 SOLUTION TOPICAL at 05:32

## 2018-05-23 RX ADMIN — Medication 1 TABLET(S): at 18:34

## 2018-05-23 RX ADMIN — PANTOPRAZOLE SODIUM 40 MILLIGRAM(S): 20 TABLET, DELAYED RELEASE ORAL at 13:59

## 2018-05-23 RX ADMIN — Medication 1 MILLIGRAM(S): at 13:59

## 2018-05-23 RX ADMIN — AMLODIPINE BESYLATE 10 MILLIGRAM(S): 2.5 TABLET ORAL at 05:32

## 2018-05-23 RX ADMIN — Medication 1 DROP(S): at 05:32

## 2018-05-23 RX ADMIN — Medication 10 MILLIGRAM(S): at 13:58

## 2018-05-23 RX ADMIN — Medication 1 TABLET(S): at 05:32

## 2018-05-23 RX ADMIN — CHLORHEXIDINE GLUCONATE 15 MILLILITER(S): 213 SOLUTION TOPICAL at 18:34

## 2018-05-23 RX ADMIN — ATORVASTATIN CALCIUM 40 MILLIGRAM(S): 80 TABLET, FILM COATED ORAL at 22:47

## 2018-05-23 RX ADMIN — Medication 1 DROP(S): at 22:48

## 2018-05-23 RX ADMIN — SENNA PLUS 2 TABLET(S): 8.6 TABLET ORAL at 22:47

## 2018-05-23 RX ADMIN — Medication 100 MILLIGRAM(S): at 13:59

## 2018-05-23 NOTE — PROGRESS NOTE ADULT - SUBJECTIVE AND OBJECTIVE BOX
[facility]                                        Neu[facility] [place]                                 Rima Whiteside, & Saad                                  [address]                                        [place], [place], [address]                                             [phone]        CC: Pontine intracranial hemorrhage.    HPI:    63y Female who c/o dizziness and then collapsed at home.  She had poor mental status and was intubated in the ED. There was no associated seizure activity.   She was found to have a large brainstem intracranial hemorrhage on CT.     Interim history:  She is now status post tracheostomy and PEG.  She appears comfortable on 6 [place]  She remains on mechanical ventilator via tracheostomy.    ROS:   Unobtainable due to patient's condition.     MEDICATIONS  (STANDING):  amLODIPine   Tablet 10 milliGRAM(s) Oral daily  atorvastatin 40 milliGRAM(s) Oral at bedtime  bisacodyl Suppository 10 milliGRAM(s) Rectal daily  chlorhexidine 0.12% Liquid 15 milliLiter(s) Swish and Spit two times a day  enoxaparin Injectable 40 milliGRAM(s) SubCutaneous daily  folic acid 1 milliGRAM(s) Oral daily  lactobacillus acidophilus 1 Tablet(s) Oral two times a day with meals  levETIRAcetam  Solution 1000 milliGRAM(s) Oral two times a day  ofloxacin 0.3% Solution 1 Drop(s) Both EYES three times a day  pantoprazole   Suspension 40 milliGRAM(s) Oral before lunch  senna 2 Tablet(s) Oral at bedtime  thiamine 100 milliGRAM(s) Oral daily    MEDICATIONS  (PRN):  acetaminophen    Suspension 650 milliGRAM(s) Oral every 6 hours PRN For Temp greater than 38 C (100.4 F)  hydrALAZINE Injectable 20 milliGRAM(s) IV Push every 6 hours PRN systolic > 150  lactulose Syrup 20 Gram(s) Oral every 6 hours PRN Constpation  polyethylene glycol 3350 17 Gram(s) Oral daily PRN Constipation    Vital Signs Last 24 Hrs  T(C): 37.1 (23 May 2018 09:00), Max: 37.1 (22 May 2018 16:21)  T(F): 98.8 (23 May 2018 09:00), Max: 98.8 (23 May 2018 09:00)  HR: 67 (23 May 2018 12:18) (67 - 86)  BP: 132/80 (23 May 2018 05:00) (131/79 - 148/82)  BP(mean): --  RR: 21 (23 May 2018 09:00) (18 - 22)  SpO2: 100% (23 May 2018 12:18) (97% - 100%)    Detailed Neurologic Exam:    Mental status: Awake and alert.   Eyes open, following instructions, can mouth some words  vertical tracking with gaze only. Face grossly symmetric.     Cranial nerves: Pupils pinpoint. There is no blink to threat. There is roving eye movement in the vertical plane. Shoulder shrug, palate and tongue cannot be assessed.     Motor/Sensory:  There is normal bulk and tone.  There is no tremor.  Still moves left better than right, but diffusely weak, 3/5 at best overall.  Squeezes hand and shows two fingers to request on both hands  Wiggles toes to request.    Reflexes: Trace throughout and plantar responses are extensor bilaterally.    Cerebellar: Cannot be tested.     Labs:  no recent CBC or CMP    Rad:  Recent neurological studies: none

## 2018-05-23 NOTE — PROGRESS NOTE ADULT - SUBJECTIVE AND OBJECTIVE BOX
FAUSTO TAYLORTONIA    [id number]    63y      Female    INTERVAL HPI/OVERNIGHT EVENTS:    patient being seen for cva and chronic resp failure.     REVIEW OF SYSTEMS:    unable to obtain secondary to mental status       Vital Signs Last 24 Hrs  T(C): 36.9 (25 May 2018 00:39), Max: 37.1 (24 May 2018 09:37)  T(F): 98.5 (25 May 2018 00:39), Max: 98.8 (24 May 2018 09:37)  HR: 83 (25 May 2018 08:49) (65 - 86)  BP: 119/74 (25 May 2018 00:39) (119/74 - 140/78)  BP(mean): --  RR: 20 (25 May 2018 00:39) (20 - 20)  SpO2: 100% (25 May 2018 08:49) (99% - 100%)    PHYSICAL EXAM:    GENERAL: NAD,   NECK: soft, Supple, No JVD,   CHEST/LUNG: rhonhci  HEART: S1S2+, Regular rate and rhythm; No murmurs,  ABDOMEN: Soft, Nontender, Nondistended; Bowel sounds present  EXTREMITIES:  2+ Peripheral Pulses, No edema  SKIN: No rashes or lesions  NEURO:awake         LABS:                  MEDICATIONS  (STANDING):  amLODIPine   Tablet 10 milliGRAM(s) Oral daily  atorvastatin 40 milliGRAM(s) Oral at bedtime  bisacodyl Suppository 10 milliGRAM(s) Rectal daily  chlorhexidine 0.12% Liquid 15 milliLiter(s) Swish and Spit two times a day  enoxaparin Injectable 40 milliGRAM(s) SubCutaneous daily  folic acid 1 milliGRAM(s) Oral daily  lactobacillus acidophilus 1 Tablet(s) Oral two times a day with meals  levETIRAcetam  Solution 1000 milliGRAM(s) Oral two times a day  pantoprazole   Suspension 40 milliGRAM(s) Oral before lunch  senna 2 Tablet(s) Oral at bedtime  thiamine 100 milliGRAM(s) Oral daily    MEDICATIONS  (PRN):  acetaminophen    Suspension 650 milliGRAM(s) Oral every 6 hours PRN For Temp greater than 38 C (100.4 F)  hydrALAZINE Injectable 20 milliGRAM(s) IV Push every 6 hours PRN systolic > 150  lactulose Syrup 20 Gram(s) Oral every 6 hours PRN Constpation  LORazepam   Injectable 0.5 milliGRAM(s) IV Push every 8 hours PRN Agitation  polyethylene glycol 3350 17 Gram(s) Oral daily PRN Constipation      RADIOLOGY & ADDITIONAL TESTS:

## 2018-05-23 NOTE — PROGRESS NOTE ADULT - ASSESSMENT
62 yo F with h/o ETOH here with pontine hemorrhage, now s/p trach/PEG.        Problem/Plan - 1:  ·  Problem: seizure --> keppra bid  --> follow up with neuro as outpatient      Problem/Plan - 2:  ·  Problem: Pontine hemorrhage.  Plan: No antiplatelets as per neuro  No neurosurgical intervention  C/w lipitor.      Problem/Plan - 3:  ·  Problem: Alcohol abuse.  Plan: c/w thiamine and folic acid.      Problem/Plan - 4:  ·  Problem: Essential hypertension.  Plan: normotensive  c/w amlodipine and lisinopril.      Problem/Plan - 5:  ·  Problem: Anemia.  Plan: Hgb stable  s/p 1u PRBC.       dc to [facility]

## 2018-05-23 NOTE — PROGRESS NOTE ADULT - SUBJECTIVE AND OBJECTIVE BOX
Late Entry, patient seen this morning around 11 am    CC: patient being seen for pontine hemorrhage     Present Symptoms:     Dyspnea: 0 on trach   Nausea/Vomiting: No  Anxiety:  No  Depression: No  Fatigue: Yes   Loss of appetite: No    Pain: none             Character-            Duration-            Effect-            Factors-            Frequency-            Location-            Severity-    Review of Systems: Reviewed                                      Positive: no chest pain   All others negative    MEDICATIONS  (STANDING):  amLODIPine   Tablet 10 milliGRAM(s) Oral daily  atorvastatin 40 milliGRAM(s) Oral at bedtime  bisacodyl Suppository 10 milliGRAM(s) Rectal daily  chlorhexidine 0.12% Liquid 15 milliLiter(s) Swish and Spit two times a day  enoxaparin Injectable 40 milliGRAM(s) SubCutaneous daily  folic acid 1 milliGRAM(s) Oral daily  lactobacillus acidophilus 1 Tablet(s) Oral two times a day with meals  levETIRAcetam  Solution 1000 milliGRAM(s) Oral two times a day  ofloxacin 0.3% Solution 1 Drop(s) Both EYES three times a day  pantoprazole   Suspension 40 milliGRAM(s) Oral before lunch  senna 2 Tablet(s) Oral at bedtime  thiamine 100 milliGRAM(s) Oral daily    MEDICATIONS  (PRN):  acetaminophen    Suspension 650 milliGRAM(s) Oral every 6 hours PRN For Temp greater than 38 C (100.4 F)  hydrALAZINE Injectable 20 milliGRAM(s) IV Push every 6 hours PRN systolic > 150  lactulose Syrup 20 Gram(s) Oral every 6 hours PRN Constpation  polyethylene glycol 3350 17 Gram(s) Oral daily PRN Constipation    PHYSICAL EXAM:    Vital Signs Last 24 Hrs  T(C): 37.1 (23 May 2018 09:00), Max: 37.1 (23 May 2018 09:00)  T(F): 98.8 (23 May 2018 09:00), Max: 98.8 (23 May 2018 09:00)  HR: 67 (23 May 2018 12:18) (67 - 80)  BP: 132/80 (23 May 2018 05:00) (131/79 - 132/80)  BP(mean): --  RR: 21 (23 May 2018 09:00) (21 - 22)  SpO2: 100% (23 May 2018 12:18) (99% - 100%)    General: alert      Karnofsky:  20 %    HEENT: trach    Lungs: comfortable     CV: normal      GI: PEG     : eleazar    MSK: weakness anasarca     Skin:  no rash    LABS:    I&O's Summary    22 May 2018 07:01  -  23 May 2018 07:00  --------------------------------------------------------  IN: 2180 mL / OUT: 1400 mL / NET: 780 mL    RADIOLOGY & ADDITIONAL STUDIES:    ADVANCE DIRECTIVES: Full code

## 2018-05-23 NOTE — CHART NOTE - NSCHARTNOTEFT_GEN_A_CORE
Source: Patient [ ]  Family [ ]   other [x ]    Current Diet:   NPO with tube feeds     Patient reports [ ] nausea  [ ] vomiting [ ] diarrhea [ ] constipation  [ ]chewing problems [ ] swallowing issues  [ ] other:     PO intake:  < 50% [ ]   50-75%  [ ]   %  [ ]  other :    Source for PO intake [ ] Patient [ ] family [ ] chart [ ] staff [ ] other    Enteral /Parenteral Nutrition:    Orogastric: Jevity1.5 @ 60mL/hour  Total Volume for 24 Hours (mL): 1440  Continuous  Starting Tube Feed Rate {mL per Hour}: 30  Increase Tube Feed Rate by (mL): 10     Every 8 hours  Until Goal Tube Feed Rate (mL per Hour): 60  Tube Feed Duration (in Hours): 24    Current Weight: (5/23) 174.2lbs   (5/16) 165lbs   (5/9) 162.8lbs    (5/2) 164.1lbs    (4/26) 151.8lbs   (question accuracy, will monitor trends)       % Weight Change     Pertinent Medications: MEDICATIONS  (STANDING):  amLODIPine   Tablet 10 milliGRAM(s) Oral daily  atorvastatin 40 milliGRAM(s) Oral at bedtime  bisacodyl Suppository 10 milliGRAM(s) Rectal daily  chlorhexidine 0.12% Liquid 15 milliLiter(s) Swish and Spit two times a day  enoxaparin Injectable 40 milliGRAM(s) SubCutaneous daily  folic acid 1 milliGRAM(s) Oral daily  lactobacillus acidophilus 1 Tablet(s) Oral two times a day with meals  levETIRAcetam  Solution 1000 milliGRAM(s) Oral two times a day  ofloxacin 0.3% Solution 1 Drop(s) Both EYES three times a day  pantoprazole   Suspension 40 milliGRAM(s) Oral before lunch  senna 2 Tablet(s) Oral at bedtime  thiamine 100 milliGRAM(s) Oral daily    MEDICATIONS  (PRN):  acetaminophen    Suspension 650 milliGRAM(s) Oral every 6 hours PRN For Temp greater than 38 C (100.4 F)  hydrALAZINE Injectable 20 milliGRAM(s) IV Push every 6 hours PRN systolic > 150  lactulose Syrup 20 Gram(s) Oral every 6 hours PRN Constpation  polyethylene glycol 3350 17 Gram(s) Oral daily PRN Constipation    Pertinent Labs:         Skin:  Intact    Nutrition focused physical exam conducted - found signs of malnutrition [X ]absent [ ]present    Subcutaneous fat loss: [ ] Orbital fat pads region, [ ]Buccal fat region, [ ]Triceps region,  [ ]Ribs region    Muscle wasting: [ ]Temples region, [ ]Clavicle region, [ ]Shoulder region, [ ]Scapula region, [ ]Interosseous region,  [ ]thigh region, [ ]Calf region    Estimated Needs:   [X ] no change since previous assessment  [ ] recalculated:     Current Nutrition Diagnosis:  Pt remains at high nutrition risk secondary to difficulty swallowing related to recent pontine hemorrhagic stroke, s/p trach and PEG placement (5/10) as evidenced by current need for nutrition support via PEG.  Pt is currently still receiving enteral feeds of Jevity @ 60ml/hr (x20 hours) 1200ml/day (1800kcal, 77gm protein). Pt was awake and alert in bed at time of follow-up. Tube feeding running at goal of 60mL/hr; pt is tolerating well and meeting estimated nutrition needs at this time. RD will continue to remain available.     Recommendations: For improved glucose control, consider changing enteral formula to Glucerna 1.5 @ 60mL//hr. Continue recommendation of MVI, thiamine and folic acid daily. Monitor weight status.       Monitoring and Evaluation:   [ ] PO intake [ ] Tolerance to diet prescription [X] Weights  [X] Follow up per protocol [X] Labs: Source: Patient [ ]  Family [ ]   other [x ]    Current Diet:   NPO with tube feeds     Patient reports [ ] nausea  [ ] vomiting [ ] diarrhea [ ] constipation  [ ]chewing problems [ ] swallowing issues  [ ] other:     PO intake:  < 50% [ ]   50-75%  [ ]   %  [ ]  other :    Source for PO intake [ ] Patient [ ] family [ ] chart [ ] staff [ ] other    Enteral /Parenteral Nutrition:    Orogastric: Jevity1.5 @ 60mL/hour  Total Volume for 24 Hours (mL): 1440  Continuous  Starting Tube Feed Rate {mL per Hour}: 30  Increase Tube Feed Rate by (mL): 10     Every 8 hours  Until Goal Tube Feed Rate (mL per Hour): 60  Tube Feed Duration (in Hours): 24    Current Weight: (5/23) 174.2lbs   (5/16) 165lbs   (5/9) 162.8lbs    (5/2) 164.1lbs    (4/26) 151.8lbs   (question accuracy, will monitor trends)       % Weight Change     Pertinent Medications: MEDICATIONS  (STANDING):  amLODIPine   Tablet 10 milliGRAM(s) Oral daily  atorvastatin 40 milliGRAM(s) Oral at bedtime  bisacodyl Suppository 10 milliGRAM(s) Rectal daily  chlorhexidine 0.12% Liquid 15 milliLiter(s) Swish and Spit two times a day  enoxaparin Injectable 40 milliGRAM(s) SubCutaneous daily  folic acid 1 milliGRAM(s) Oral daily  lactobacillus acidophilus 1 Tablet(s) Oral two times a day with meals  levETIRAcetam  Solution 1000 milliGRAM(s) Oral two times a day  ofloxacin 0.3% Solution 1 Drop(s) Both EYES three times a day  pantoprazole   Suspension 40 milliGRAM(s) Oral before lunch  senna 2 Tablet(s) Oral at bedtime  thiamine 100 milliGRAM(s) Oral daily    MEDICATIONS  (PRN):  acetaminophen    Suspension 650 milliGRAM(s) Oral every 6 hours PRN For Temp greater than 38 C (100.4 F)  hydrALAZINE Injectable 20 milliGRAM(s) IV Push every 6 hours PRN systolic > 150  lactulose Syrup 20 Gram(s) Oral every 6 hours PRN Constpation  polyethylene glycol 3350 17 Gram(s) Oral daily PRN Constipation    Pertinent Labs:         Skin:  Intact    Nutrition focused physical exam conducted - found signs of malnutrition [X ]absent [ ]present    Subcutaneous fat loss: [ ] Orbital fat pads region, [ ]Buccal fat region, [ ]Triceps region,  [ ]Ribs region    Muscle wasting: [ ]Temples region, [ ]Clavicle region, [ ]Shoulder region, [ ]Scapula region, [ ]Interosseous region,  [ ]thigh region, [ ]Calf region    Estimated Needs:   [X ] no change since previous assessment  [ ] recalculated:     Current Nutrition Diagnosis:  Pt remains at high nutrition risk secondary to difficulty swallowing related to recent pontine hemorrhagic stroke, s/p trach and PEG placement (5/10) as evidenced by current need for nutrition support via PEG.  Pt is currently still receiving enteral feeds of Jevity @ 60ml/hr (x20 hours) 1200ml/day (1800kcal, 77gm protein). Pt was awake and alert in bed at time of follow-up; on full vent support. Tube feeding running at goal of 60mL/hr; pt is tolerating well and meeting estimated nutrition needs at this time. RD will continue to remain available.     Recommendations: For improved glucose control, consider changing enteral formula to Glucerna 1.5 @ 60mL//hr. Continue recommendation of MVI, thiamine and folic acid daily. Monitor weight status.       Monitoring and Evaluation:   [ ] PO intake [ ] Tolerance to diet prescription [X] Weights  [X] Follow up per protocol [X] Labs: Source: Patient [ ]  Family [ ]   other [x ]    Current Diet:   NPO with tube feeds     Patient reports [ ] nausea  [ ] vomiting [ ] diarrhea [ ] constipation  [ ]chewing problems [ ] swallowing issues  [ ] other:     PO intake:  < 50% [ ]   50-75%  [ ]   %  [ ]  other :    Source for PO intake [ ] Patient [ ] family [ ] chart [ ] staff [ ] other    Enteral /Parenteral Nutrition:    Orogastric: Jevity1.5 @ 60mL/hour  Total Volume for 24 Hours (mL): 1440  Continuous  Starting Tube Feed Rate {mL per Hour}: 30  Increase Tube Feed Rate by (mL): 10     Every 8 hours  Until Goal Tube Feed Rate (mL per Hour): 60  Tube Feed Duration (in Hours): 24    Current Weight: (5/23) 165lbs   (5/16) 165lbs   (5/9) 162.8lbs    (5/2) 164.1lbs    (4/26) 151.8lbs   (question accuracy, will monitor trends)       % Weight Change     Pertinent Medications: MEDICATIONS  (STANDING):  amLODIPine   Tablet 10 milliGRAM(s) Oral daily  atorvastatin 40 milliGRAM(s) Oral at bedtime  bisacodyl Suppository 10 milliGRAM(s) Rectal daily  chlorhexidine 0.12% Liquid 15 milliLiter(s) Swish and Spit two times a day  enoxaparin Injectable 40 milliGRAM(s) SubCutaneous daily  folic acid 1 milliGRAM(s) Oral daily  lactobacillus acidophilus 1 Tablet(s) Oral two times a day with meals  levETIRAcetam  Solution 1000 milliGRAM(s) Oral two times a day  ofloxacin 0.3% Solution 1 Drop(s) Both EYES three times a day  pantoprazole   Suspension 40 milliGRAM(s) Oral before lunch  senna 2 Tablet(s) Oral at bedtime  thiamine 100 milliGRAM(s) Oral daily    MEDICATIONS  (PRN):  acetaminophen    Suspension 650 milliGRAM(s) Oral every 6 hours PRN For Temp greater than 38 C (100.4 F)  hydrALAZINE Injectable 20 milliGRAM(s) IV Push every 6 hours PRN systolic > 150  lactulose Syrup 20 Gram(s) Oral every 6 hours PRN Constpation  polyethylene glycol 3350 17 Gram(s) Oral daily PRN Constipation    Pertinent Labs:         Skin:  Intact    Nutrition focused physical exam conducted - found signs of malnutrition [X ]absent [ ]present    Subcutaneous fat loss: [ ] Orbital fat pads region, [ ]Buccal fat region, [ ]Triceps region,  [ ]Ribs region    Muscle wasting: [ ]Temples region, [ ]Clavicle region, [ ]Shoulder region, [ ]Scapula region, [ ]Interosseous region,  [ ]thigh region, [ ]Calf region    Estimated Needs:   [X ] no change since previous assessment  [ ] recalculated:     Current Nutrition Diagnosis:  Pt remains at high nutrition risk secondary to difficulty swallowing related to recent pontine hemorrhagic stroke, s/p trach and PEG placement (5/10) as evidenced by current need for nutrition support via PEG.  Pt is currently still receiving enteral feeds of Jevity @ 60ml/hr (x20 hours) 1200ml/day (1800kcal, 77gm protein). Pt was awake and alert in bed at time of follow-up; on full vent support. Tube feeding running at goal of 60mL/hr; pt is tolerating well and meeting estimated nutrition needs at this time. RD will continue to remain available.     Recommendations: For improved glucose control, consider changing enteral formula to Glucerna 1.5 @ 60mL//hr. Continue recommendation of MVI, thiamine and folic acid daily. Monitor weight status.       Monitoring and Evaluation:   [ ] PO intake [ ] Tolerance to diet prescription [X] Weights  [X] Follow up per protocol [X] Labs:

## 2018-05-23 NOTE — PROGRESS NOTE ADULT - ASSESSMENT
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     1) ICH ( intracranial hemorrhage )  Avoid antiplatelet.   Continue blood pressure control.   Supportive care/vent management.  She is s/p tracheostomy/PEG.  Overall prognosis guarded although has been showing very slow improvements.     2) Altered mental status  Resolving  Due to ICH    3) Hypertension  Continue blood pressure control.     4) will eventually need LTC in vent capable facility    will follow with you    Anirudh Abarca MD PhD   [id number]

## 2018-05-23 NOTE — PROGRESS NOTE ADULT - PROBLEM SELECTOR PLAN 4
- last I spoke to  Capo 2 days ago, he was going to talk to her sisters about at least initiating a DNR order for her, I have not heard back from him with any definitive decisions. She will need to be transitioned to a ventilator facility. As much as Capo has expressed she wouldn't want to be in a nursing home or on the machines long term, I believe he is struggling because she has had some more wakefulness, and the dynamics between him and the sisters, in making the decision to withdraw the ventilator. She will need to go to a vent facility if he decides to continue treatments and the vent. Will remain available.

## 2018-05-23 NOTE — PROGRESS NOTE ADULT - PROBLEM SELECTOR PLAN 1
- trached/PEGed, very poor overall likelihood for any linda of meaningful neurological or functional recovery.

## 2018-05-23 NOTE — PROGRESS NOTE ADULT - ASSESSMENT
63F with catastrophic pontine hemorrhage, vent dependent respiratory failure, trach/PEG, with very poor prognosis for meaningful neurological recovery.

## 2018-05-23 NOTE — PROGRESS NOTE ADULT - ATTENDING COMMENTS
Thank you for the opportunity to assist with the care of this patient.   [facility] [phone].

## 2018-05-24 PROCEDURE — 99233 SBSQ HOSP IP/OBS HIGH 50: CPT

## 2018-05-24 PROCEDURE — 99232 SBSQ HOSP IP/OBS MODERATE 35: CPT

## 2018-05-24 RX ORDER — TAMSULOSIN HYDROCHLORIDE 0.4 MG/1
0.4 CAPSULE ORAL AT BEDTIME
Qty: 0 | Refills: 0 | Status: DISCONTINUED | OUTPATIENT
Start: 2018-05-24 | End: 2018-05-24

## 2018-05-24 RX ADMIN — Medication 1 TABLET(S): at 05:37

## 2018-05-24 RX ADMIN — Medication 100 MILLIGRAM(S): at 13:35

## 2018-05-24 RX ADMIN — ATORVASTATIN CALCIUM 40 MILLIGRAM(S): 80 TABLET, FILM COATED ORAL at 21:42

## 2018-05-24 RX ADMIN — Medication 1 TABLET(S): at 17:56

## 2018-05-24 RX ADMIN — CHLORHEXIDINE GLUCONATE 15 MILLILITER(S): 213 SOLUTION TOPICAL at 05:37

## 2018-05-24 RX ADMIN — Medication 1 DROP(S): at 05:37

## 2018-05-24 RX ADMIN — AMLODIPINE BESYLATE 10 MILLIGRAM(S): 2.5 TABLET ORAL at 05:38

## 2018-05-24 RX ADMIN — CHLORHEXIDINE GLUCONATE 15 MILLILITER(S): 213 SOLUTION TOPICAL at 17:57

## 2018-05-24 RX ADMIN — ENOXAPARIN SODIUM 40 MILLIGRAM(S): 100 INJECTION SUBCUTANEOUS at 13:35

## 2018-05-24 RX ADMIN — Medication 10 MILLIGRAM(S): at 13:36

## 2018-05-24 RX ADMIN — LEVETIRACETAM 1000 MILLIGRAM(S): 250 TABLET, FILM COATED ORAL at 05:37

## 2018-05-24 RX ADMIN — PANTOPRAZOLE SODIUM 40 MILLIGRAM(S): 20 TABLET, DELAYED RELEASE ORAL at 13:36

## 2018-05-24 RX ADMIN — SENNA PLUS 2 TABLET(S): 8.6 TABLET ORAL at 21:42

## 2018-05-24 RX ADMIN — LEVETIRACETAM 1000 MILLIGRAM(S): 250 TABLET, FILM COATED ORAL at 17:56

## 2018-05-24 RX ADMIN — Medication 1 MILLIGRAM(S): at 13:36

## 2018-05-24 NOTE — PROGRESS NOTE ADULT - SUBJECTIVE AND OBJECTIVE BOX
OVERNIGHT EVENTS: patient being seen for pontine hemorrhage, vent dependence     Present Symptoms:     Dyspnea: vented  Nausea/Vomiting: No  Anxiety:  No  Depression: No  Fatigue: Yes   Loss of appetite: No    Pain: none             Character-            Duration-            Effect-            Factors-            Frequency-            Location-            Severity-    Review of Systems: Reviewed                     Negative: no chest pain    All others negative    MEDICATIONS  (STANDING):  amLODIPine   Tablet 10 milliGRAM(s) Oral daily  atorvastatin 40 milliGRAM(s) Oral at bedtime  bisacodyl Suppository 10 milliGRAM(s) Rectal daily  chlorhexidine 0.12% Liquid 15 milliLiter(s) Swish and Spit two times a day  enoxaparin Injectable 40 milliGRAM(s) SubCutaneous daily  folic acid 1 milliGRAM(s) Oral daily  lactobacillus acidophilus 1 Tablet(s) Oral two times a day with meals  levETIRAcetam  Solution 1000 milliGRAM(s) Oral two times a day  pantoprazole   Suspension 40 milliGRAM(s) Oral before lunch  senna 2 Tablet(s) Oral at bedtime  tamsulosin 0.4 milliGRAM(s) Oral at bedtime  thiamine 100 milliGRAM(s) Oral daily    MEDICATIONS  (PRN):  acetaminophen    Suspension 650 milliGRAM(s) Oral every 6 hours PRN For Temp greater than 38 C (100.4 F)  hydrALAZINE Injectable 20 milliGRAM(s) IV Push every 6 hours PRN systolic > 150  lactulose Syrup 20 Gram(s) Oral every 6 hours PRN Constpation  polyethylene glycol 3350 17 Gram(s) Oral daily PRN Constipation    PHYSICAL EXAM:    Vital Signs Last 24 Hrs  T(C): 36.6 (24 May 2018 05:00), Max: 37.2 (23 May 2018 16:30)  T(F): 97.8 (24 May 2018 05:00), Max: 98.9 (23 May 2018 16:30)  HR: 84 (24 May 2018 08:55) (60 - 84)  BP: 125/81 (23 May 2018 23:31) (125/81 - 125/81)  BP(mean): --  RR: 20 (23 May 2018 23:31) (20 - 20)  SpO2: 99% (24 May 2018 08:55) (99% - 100%)    General: alert nods yes and no     Karnofsky: 30 %    HEENT: trach    Lungs: comfortable     CV: normal      GI: PEG     : incontinent     MSK: weakness, anasarca     Skin: no rash    LABS:    I&O's Summary    23 May 2018 07:01  -  24 May 2018 07:00  --------------------------------------------------------  IN: 1280 mL / OUT: 1800 mL / NET: -520 mL    RADIOLOGY & ADDITIONAL STUDIES:    ADVANCE DIRECTIVES: Full code

## 2018-05-24 NOTE — PROGRESS NOTE ADULT - SUBJECTIVE AND OBJECTIVE BOX
PULMONARY PROGRESS NOTE      MALVIN BARRETO-[id number]    Patient is a 63y old  Female who presents with a chief complaint of cva (18 May 2018 12:12)      INTERVAL HPI/OVERNIGHT EVENTS:  Was apparently left on CPAP all night>today with fatigue, back on A/C.   Otherwise no change    MEDICATIONS  (STANDING):  amLODIPine   Tablet 10 milliGRAM(s) Oral daily  atorvastatin 40 milliGRAM(s) Oral at bedtime  bisacodyl Suppository 10 milliGRAM(s) Rectal daily  chlorhexidine 0.12% Liquid 15 milliLiter(s) Swish and Spit two times a day  enoxaparin Injectable 40 milliGRAM(s) SubCutaneous daily  folic acid 1 milliGRAM(s) Oral daily  lactobacillus acidophilus 1 Tablet(s) Oral two times a day with meals  levETIRAcetam  Solution 1000 milliGRAM(s) Oral two times a day  pantoprazole   Suspension 40 milliGRAM(s) Oral before lunch  senna 2 Tablet(s) Oral at bedtime  thiamine 100 milliGRAM(s) Oral daily      MEDICATIONS  (PRN):  acetaminophen    Suspension 650 milliGRAM(s) Oral every 6 hours PRN For Temp greater than 38 C (100.4 F)  hydrALAZINE Injectable 20 milliGRAM(s) IV Push every 6 hours PRN systolic > 150  lactulose Syrup 20 Gram(s) Oral every 6 hours PRN Constpation  LORazepam   Injectable 0.5 milliGRAM(s) IV Push every 8 hours PRN Agitation  polyethylene glycol 3350 17 Gram(s) Oral daily PRN Constipation      Allergies    No Known Allergies    Intolerances        PAST MEDICAL & SURGICAL HISTORY:  Alcohol abuse  No significant past surgical history          Vital Signs Last 24 Hrs  T(C): 37.1 (24 May 2018 09:37), Max: 37.2 (23 May 2018 16:30)  T(F): 98.8 (24 May 2018 09:37), Max: 98.9 (23 May 2018 16:30)  HR: 75 (24 May 2018 16:07) (60 - 84)  BP: 125/81 (23 May 2018 23:31) (125/81 - 125/81)  BP(mean): --  RR: 20 (23 May 2018 23:31) (20 - 20)  SpO2: 100% (24 May 2018 16:07) (99% - 100%)    PHYSICAL EXAMINATION:    GENERAL: The patient is awake and alert in no apparent distress.     HEENT: Head is normocephalic and atraumatic. Extraocular muscles are intact. Mucous membranes are moist.    NECK: Supple.    LUNGS:Scattered rhonchi    HEART: Regular rate and rhythm without murmur.    ABDOMEN: Soft, nontender, and nondistended.      EXTREMITIES: Without any cyanosis, clubbing, rash, lesions or edema.    NEUROLOGIC: Grossly intact.    SKIN: No ulceration or induration present.      LABS:                              MICROBIOLOGY:    RADIOLOGY & ADDITIONAL STUDIES:

## 2018-05-24 NOTE — PROGRESS NOTE ADULT - ASSESSMENT
62 yo F with h/o ETOH here with pontine hemorrhage, now s/p trach/PEG.        Problem/Plan - 1:  ·  Problem: seizure --> keppra bid  --> follow up with neuro as outpatient      Problem/Plan - 2:  ·  Problem: Pontine hemorrhage.  Plan: No antiplatelets as per neuro  No neurosurgical intervention  C/w lipitor.      Problem/Plan - 3:  ·  Problem: Alcohol abuse.  Plan: c/w thiamine and folic acid.      Problem/Plan - 4:  ·  Problem: Essential hypertension.  Plan: normotensive  c/w amlodipine and lisinopril.      Problem/Plan - 5:  ·  Problem: Anemia.  Plan: Hgb stable  s/p 1u PRBC.         dispo -> dc to [facility]

## 2018-05-24 NOTE — PROGRESS NOTE ADULT - ATTENDING COMMENTS
Thank you for the opportunity to assist with the care of this patient.   [facility] [phone].

## 2018-05-24 NOTE — PROGRESS NOTE ADULT - PROBLEM SELECTOR PLAN 4
-lengthy discussion with  Capo. He is meeting with the  today at 2pm to discuss nursing home vent facility placement. I discussed with him the importance of establishing directive of DNR before she gets transferred to the facility. He and her family (niece, sisters) have all expressed to me that if her heart naturally stops and "God takes her" they would not want CPR or for her to be brought back. I gave him a MOLST to review and hopefully we can complete this before she gets discharged to a vent facility. I also explained to him high risk of readmissions, etc. He is not ready to make definitive decisions.

## 2018-05-24 NOTE — PROGRESS NOTE ADULT - SUBJECTIVE AND OBJECTIVE BOX
FAUSTO BARRETO    [id number]    63y      Female    INTERVAL HPI/OVERNIGHT EVENTS:    patient being seen for pontine hemorrhage and respiratory failure requiring trach. Patient seen at bedside.       REVIEW OF SYSTEMS:    unable to obtain secondary to mental status     Vital Signs Last 24 Hrs  T(C): 36.9 (25 May 2018 00:39), Max: 37.1 (24 May 2018 09:37)  T(F): 98.5 (25 May 2018 00:39), Max: 98.8 (24 May 2018 09:37)  HR: 83 (25 May 2018 08:49) (65 - 86)  BP: 119/74 (25 May 2018 00:39) (119/74 - 140/78)  BP(mean): --  RR: 20 (25 May 2018 00:39) (20 - 20)  SpO2: 100% (25 May 2018 08:49) (99% - 100%)    PHYSICAL EXAM:    GENERAL: NAD,   NECK: soft, Supple, No JVD,   CHEST/LUNG: rhonhci  HEART: S1S2+, Regular rate and rhythm; No murmurs, rubs, or gallops  ABDOMEN: Soft, Nontender, Nondistended; Bowel sounds present  EXTREMITIES:  2+ Peripheral Pulses, No edema  SKIN: No rashes or lesions  NEURO:awake       LABS:                  MEDICATIONS  (STANDING):  amLODIPine   Tablet 10 milliGRAM(s) Oral daily  atorvastatin 40 milliGRAM(s) Oral at bedtime  bisacodyl Suppository 10 milliGRAM(s) Rectal daily  chlorhexidine 0.12% Liquid 15 milliLiter(s) Swish and Spit two times a day  enoxaparin Injectable 40 milliGRAM(s) SubCutaneous daily  folic acid 1 milliGRAM(s) Oral daily  lactobacillus acidophilus 1 Tablet(s) Oral two times a day with meals  levETIRAcetam  Solution 1000 milliGRAM(s) Oral two times a day  pantoprazole   Suspension 40 milliGRAM(s) Oral before lunch  senna 2 Tablet(s) Oral at bedtime  thiamine 100 milliGRAM(s) Oral daily    MEDICATIONS  (PRN):  acetaminophen    Suspension 650 milliGRAM(s) Oral every 6 hours PRN For Temp greater than 38 C (100.4 F)  hydrALAZINE Injectable 20 milliGRAM(s) IV Push every 6 hours PRN systolic > 150  lactulose Syrup 20 Gram(s) Oral every 6 hours PRN Constpation  LORazepam   Injectable 0.5 milliGRAM(s) IV Push every 8 hours PRN Agitation  polyethylene glycol 3350 17 Gram(s) Oral daily PRN Constipation      RADIOLOGY & ADDITIONAL TESTS:

## 2018-05-24 NOTE — PROGRESS NOTE ADULT - PROBLEM SELECTOR PROBLEM 1
Failure to wean from mechanical ventilation
ICH (intracerebral hemorrhage)
ICH (intracerebral hemorrhage)
Nontraumatic intracerebral hemorrhage in brainstem, unspecified laterality
Acute respiratory failure with hypoxia
Acute respiratory failure with hypoxia
Failure to wean from mechanical ventilation
Fever
Haemophilus influenzae infection
ICH (intracerebral hemorrhage)
Nontraumatic intracerebral hemorrhage in brainstem, unspecified laterality
Nontraumatic intracerebral hemorrhage in brainstem, unspecified laterality
Pontine hemorrhage
Respiratory failure requiring intubation
ICH (intracerebral hemorrhage)
Pontine hemorrhage
Respiratory failure requiring intubation
Pontine hemorrhage
Acute respiratory failure with hypoxia
ICH (intracerebral hemorrhage)
ICH (intracerebral hemorrhage)
Nontraumatic intracerebral hemorrhage in brainstem, unspecified laterality
ICH (intracerebral hemorrhage)
ICH (intracerebral hemorrhage)
Nontraumatic intracerebral hemorrhage in brainstem, unspecified laterality
Respiratory failure requiring intubation
Nontraumatic intracerebral hemorrhage in brainstem, unspecified laterality
ICH (intracerebral hemorrhage)
Fever
Fever

## 2018-05-24 NOTE — PROGRESS NOTE ADULT - ASSESSMENT
Patient with severe neurologic dysfunction secondary to pontine bleed  Not weaning well at this point but should not be on CPAP all night at this point.     Plan:  1.Discussed with respiratory>will place on A/C at night with reattempt at CPAP tomorrow.

## 2018-05-24 NOTE — PROGRESS NOTE ADULT - PROBLEM SELECTOR PROBLEM 2
Pontine hemorrhage
Acute respiratory failure with hypoxia
Nontraumatic intracerebral hemorrhage in brainstem, unspecified laterality
Acute respiratory failure
Pontine hemorrhage
Failure to thrive in adult
Acute respiratory failure
Acute respiratory failure
Acute respiratory failure with hypoxia
Acute respiratory failure
Acute respiratory failure with hypoxia
Debility
Hypertensive crisis
Pontine hemorrhage
Respiratory failure requiring intubation
Tracheitis
Acute respiratory failure
Pontine hemorrhage
Respiratory failure requiring intubation
Respiratory failure requiring intubation
Acute respiratory failure
ICH (intracerebral hemorrhage)
Acute respiratory failure
Acute respiratory failure with hypoxia
Acute respiratory failure
Acute respiratory failure
Encephalopathy acute

## 2018-05-24 NOTE — PROGRESS NOTE ADULT - NSHPATTENDINGPLANDISCUSS_GEN_ALL_CORE
Dr Anton (MICU attending).
Dr Wang (MICU attending)
Dr. Bateman
Dr. Wang
Dr. Wang
Dr. Anton, Dr. Cruz
Dr. Wang
Dr. Abarca
Dr. Anton
Dr. Jose
Dr. Jose
Dr. Wang

## 2018-05-24 NOTE — PROGRESS NOTE ADULT - PROBLEM SELECTOR PLAN 2
-patient is essentially going to remain bedbound and complete assist with everything. low chances for functional recovery given extent and location of injury.

## 2018-05-25 VITALS
OXYGEN SATURATION: 99 % | DIASTOLIC BLOOD PRESSURE: 80 MMHG | SYSTOLIC BLOOD PRESSURE: 136 MMHG | HEART RATE: 74 BPM | RESPIRATION RATE: 20 BRPM | TEMPERATURE: 99 F

## 2018-05-25 PROCEDURE — 83550 IRON BINDING TEST: CPT

## 2018-05-25 PROCEDURE — 70496 CT ANGIOGRAPHY HEAD: CPT

## 2018-05-25 PROCEDURE — 96374 THER/PROPH/DIAG INJ IV PUSH: CPT | Mod: XU

## 2018-05-25 PROCEDURE — 36430 TRANSFUSION BLD/BLD COMPNT: CPT

## 2018-05-25 PROCEDURE — 87186 SC STD MICRODIL/AGAR DIL: CPT

## 2018-05-25 PROCEDURE — 96375 TX/PRO/DX INJ NEW DRUG ADDON: CPT | Mod: XU

## 2018-05-25 PROCEDURE — 85610 PROTHROMBIN TIME: CPT

## 2018-05-25 PROCEDURE — 97110 THERAPEUTIC EXERCISES: CPT

## 2018-05-25 PROCEDURE — 82728 ASSAY OF FERRITIN: CPT

## 2018-05-25 PROCEDURE — 31720 CLEARANCE OF AIRWAYS: CPT

## 2018-05-25 PROCEDURE — 85014 HEMATOCRIT: CPT

## 2018-05-25 PROCEDURE — P9016: CPT

## 2018-05-25 PROCEDURE — 86901 BLOOD TYPING SEROLOGIC RH(D): CPT

## 2018-05-25 PROCEDURE — 36600 WITHDRAWAL OF ARTERIAL BLOOD: CPT

## 2018-05-25 PROCEDURE — 80053 COMPREHEN METABOLIC PANEL: CPT

## 2018-05-25 PROCEDURE — 86850 RBC ANTIBODY SCREEN: CPT

## 2018-05-25 PROCEDURE — 43753 TX GASTRO INTUB W/ASP: CPT

## 2018-05-25 PROCEDURE — 83605 ASSAY OF LACTIC ACID: CPT

## 2018-05-25 PROCEDURE — 80061 LIPID PANEL: CPT

## 2018-05-25 PROCEDURE — 87184 SC STD DISK METHOD PER PLATE: CPT

## 2018-05-25 PROCEDURE — 94770: CPT

## 2018-05-25 PROCEDURE — 83735 ASSAY OF MAGNESIUM: CPT

## 2018-05-25 PROCEDURE — 80048 BASIC METABOLIC PNL TOTAL CA: CPT

## 2018-05-25 PROCEDURE — 86900 BLOOD TYPING SEROLOGIC ABO: CPT

## 2018-05-25 PROCEDURE — 87070 CULTURE OTHR SPECIMN AEROBIC: CPT

## 2018-05-25 PROCEDURE — 99291 CRITICAL CARE FIRST HOUR: CPT | Mod: 25

## 2018-05-25 PROCEDURE — 99232 SBSQ HOSP IP/OBS MODERATE 35: CPT

## 2018-05-25 PROCEDURE — 82330 ASSAY OF CALCIUM: CPT

## 2018-05-25 PROCEDURE — 94799 UNLISTED PULMONARY SVC/PX: CPT

## 2018-05-25 PROCEDURE — 99231 SBSQ HOSP IP/OBS SF/LOW 25: CPT

## 2018-05-25 PROCEDURE — 36415 COLL VENOUS BLD VENIPUNCTURE: CPT

## 2018-05-25 PROCEDURE — 84295 ASSAY OF SERUM SODIUM: CPT

## 2018-05-25 PROCEDURE — 82550 ASSAY OF CK (CPK): CPT

## 2018-05-25 PROCEDURE — 97163 PT EVAL HIGH COMPLEX 45 MIN: CPT

## 2018-05-25 PROCEDURE — 82947 ASSAY GLUCOSE BLOOD QUANT: CPT

## 2018-05-25 PROCEDURE — 94760 N-INVAS EAR/PLS OXIMETRY 1: CPT

## 2018-05-25 PROCEDURE — 83036 HEMOGLOBIN GLYCOSYLATED A1C: CPT

## 2018-05-25 PROCEDURE — 82435 ASSAY OF BLOOD CHLORIDE: CPT

## 2018-05-25 PROCEDURE — 81001 URINALYSIS AUTO W/SCOPE: CPT

## 2018-05-25 PROCEDURE — 86923 COMPATIBILITY TEST ELECTRIC: CPT

## 2018-05-25 PROCEDURE — 85027 COMPLETE CBC AUTOMATED: CPT

## 2018-05-25 PROCEDURE — 84132 ASSAY OF SERUM POTASSIUM: CPT

## 2018-05-25 PROCEDURE — 82553 CREATINE MB FRACTION: CPT

## 2018-05-25 PROCEDURE — 84443 ASSAY THYROID STIM HORMONE: CPT

## 2018-05-25 PROCEDURE — 70450 CT HEAD/BRAIN W/O DYE: CPT

## 2018-05-25 PROCEDURE — 94002 VENT MGMT INPAT INIT DAY: CPT

## 2018-05-25 PROCEDURE — 84484 ASSAY OF TROPONIN QUANT: CPT

## 2018-05-25 PROCEDURE — 87086 URINE CULTURE/COLONY COUNT: CPT

## 2018-05-25 PROCEDURE — 85730 THROMBOPLASTIN TIME PARTIAL: CPT

## 2018-05-25 PROCEDURE — 97530 THERAPEUTIC ACTIVITIES: CPT

## 2018-05-25 PROCEDURE — 71045 X-RAY EXAM CHEST 1 VIEW: CPT

## 2018-05-25 PROCEDURE — 74018 RADEX ABDOMEN 1 VIEW: CPT

## 2018-05-25 PROCEDURE — 87040 BLOOD CULTURE FOR BACTERIA: CPT

## 2018-05-25 PROCEDURE — 94003 VENT MGMT INPAT SUBQ DAY: CPT

## 2018-05-25 PROCEDURE — 93005 ELECTROCARDIOGRAM TRACING: CPT

## 2018-05-25 PROCEDURE — 80307 DRUG TEST PRSMV CHEM ANLYZR: CPT

## 2018-05-25 PROCEDURE — L8699: CPT

## 2018-05-25 PROCEDURE — 84466 ASSAY OF TRANSFERRIN: CPT

## 2018-05-25 PROCEDURE — C1889: CPT

## 2018-05-25 PROCEDURE — 84145 PROCALCITONIN (PCT): CPT

## 2018-05-25 PROCEDURE — 94640 AIRWAY INHALATION TREATMENT: CPT

## 2018-05-25 PROCEDURE — 85045 AUTOMATED RETICULOCYTE COUNT: CPT

## 2018-05-25 PROCEDURE — 84100 ASSAY OF PHOSPHORUS: CPT

## 2018-05-25 PROCEDURE — 82803 BLOOD GASES ANY COMBINATION: CPT

## 2018-05-25 RX ADMIN — Medication 10 MILLIGRAM(S): at 12:25

## 2018-05-25 RX ADMIN — AMLODIPINE BESYLATE 10 MILLIGRAM(S): 2.5 TABLET ORAL at 05:35

## 2018-05-25 RX ADMIN — CHLORHEXIDINE GLUCONATE 15 MILLILITER(S): 213 SOLUTION TOPICAL at 05:35

## 2018-05-25 RX ADMIN — Medication 1 MILLIGRAM(S): at 12:25

## 2018-05-25 RX ADMIN — PANTOPRAZOLE SODIUM 40 MILLIGRAM(S): 20 TABLET, DELAYED RELEASE ORAL at 12:25

## 2018-05-25 RX ADMIN — Medication 100 MILLIGRAM(S): at 14:51

## 2018-05-25 RX ADMIN — LEVETIRACETAM 1000 MILLIGRAM(S): 250 TABLET, FILM COATED ORAL at 05:35

## 2018-05-25 RX ADMIN — ENOXAPARIN SODIUM 40 MILLIGRAM(S): 100 INJECTION SUBCUTANEOUS at 12:25

## 2018-05-25 RX ADMIN — Medication 1 TABLET(S): at 05:35

## 2018-05-25 NOTE — PROGRESS NOTE ADULT - PROVIDER SPECIALTY LIST ADULT
Anesthesia
Critical Care
Hospitalist
Hospitalist
Internal Medicine
MICU
Neurology
Neurosurgery
Palliative Care
Pulmonology
Palliative Care
Internal Medicine
Neurology
Critical Care
Thoracic Surgery
Critical Care
Palliative Care
Hospitalist
Hospitalist
Critical Care

## 2018-05-25 NOTE — PROGRESS NOTE ADULT - SUBJECTIVE AND OBJECTIVE BOX
[facility]                                        [facility] [place]                                 Rima Whiteside, & Saad                                  [address]                                        [place], [place], [address]                                             [phone]        CC: Pontine intracranial hemorrhage.    HPI:    63y Female who c/o dizziness and then collapsed at home.  She had poor mental status and was intubated in the ED. There was no associated seizure activity.   She was found to have a large brainstem intracranial hemorrhage on CT.     Interim history:  She remains on 6 [place].   She is now status post tracheostomy and PEG.    She appears comfortable.  She remains on mechanical ventilator via tracheostomy.    ROS:   Unobtainable due to patient's condition.     MEDICATIONS  (STANDING):  amLODIPine   Tablet 10 milliGRAM(s) Oral daily  atorvastatin 40 milliGRAM(s) Oral at bedtime  bisacodyl Suppository 10 milliGRAM(s) Rectal daily  chlorhexidine 0.12% Liquid 15 milliLiter(s) Swish and Spit two times a day  enoxaparin Injectable 40 milliGRAM(s) SubCutaneous daily  folic acid 1 milliGRAM(s) Oral daily  lactobacillus acidophilus 1 Tablet(s) Oral two times a day with meals  levETIRAcetam  Solution 1000 milliGRAM(s) Oral two times a day  pantoprazole   Suspension 40 milliGRAM(s) Oral before lunch  senna 2 Tablet(s) Oral at bedtime  thiamine 100 milliGRAM(s) Oral daily      Vital Signs Last 24 Hrs  T(F): 98.5 (25 May 2018 00:39), Max: 98.5 (25 May 2018 00:39)  HR: 83 (25 May 2018 08:49) (65 - 86)  BP: 119/74 (25 May 2018 00:39) (119/74 - 140/78)  RR: 20 (25 May 2018 00:39) (20 - 20)  SpO2: 100% (25 May 2018 08:49) (99% - 100%)    Detailed Neurologic Exam:    Mental status: Awake and alert.   Eyes open, following instructions, can mouth some words  vertical tracking with gaze only. Face grossly symmetric.     Cranial nerves: Pupils pinpoint. There is no blink to threat. There is roving eye movement in the vertical plane. Shoulder shrug, palate and tongue cannot be assessed.     Motor/Sensory:  There is normal bulk and tone.  There is no tremor.  Still moves left better than right, but diffusely weak, 3/5 at best overall.  Squeezes hand and shows two fingers to request on both hands  Wiggles toes to request.    Reflexes: Trace throughout and plantar responses are extensor bilaterally.

## 2018-05-25 NOTE — PROGRESS NOTE ADULT - ASSESSMENT
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     1) ICH ( intracranial hemorrhage )  Avoid antiplatelet.   Continue blood pressure control.   Supportive care/vent management.  She is s/p tracheostomy/PEG.    2) Altered mental status  Resolving  Due to ICH    3) Hypertension  Continue blood pressure control.     4) Discharge planning.   Will eventually need LTC in vent capable facility (? [facility]).

## 2019-02-21 NOTE — PROGRESS NOTE ADULT - SUBJECTIVE AND OBJECTIVE BOX
Patient is a 63y old  Female who presents with a chief complaint of complained of dizzyness and collapsed at home (23 Apr 2018 10:59)    PAST MEDICAL & SURGICAL HISTORY:  Alcohol abuse  No significant past surgical history    FAUSTO BARRETO   63y    Female    BRIEF HOSPITAL COURSE:    64 y/o F with a h/o EtOH abuse, admitted on 4/23 with dizziness and syncope. Upon arrival in the ED she was obtunded and intubated for airway support. Her BP was noted to be elevated. 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 There was no evidence of aneurysm or dissection noted on CTA. IV Cardene initiated for BP control. Poor neurological exam. Neurosurgery not recommending surgical intervention.    Remains with poor neuro exam.  Family has decided on trach/peg.  D/W sister.          Review of Systems:      TO      ICU Vital Signs Last 24 Hrs  T(C): 37.6 (06 May 2018 20:00), Max: 38.3 (06 May 2018 16:00)  T(F): 99.7 (06 May 2018 20:00), Max: 100.9 (06 May 2018 16:00)  HR: 86 (06 May 2018 20:18) (68 - 97)  BP: 111/57 (06 May 2018 20:00) (100/57 - 141/72)  BP(mean): 78 (06 May 2018 20:00) (73 - 97)  ABP: --  ABP(mean): --  RR: 18 (06 May 2018 20:00) (16 - 35)  SpO2: 100% (06 May 2018 20:18) (98% - 100%)    Physical Examination:    General:   Unresponsive on vent     HEENT:   pupils pinpoint  intermittant nysatgmus      PULM: bilateral BS     CVS:  s1 s2 reg    ABD: soft NT     EXT: scar edema     SKIN: warm    Neuro:  less responsive than yesterday.        Mode: AC/ CMV (Assist Control/ Continuous Mandatory Ventilation)  RR (machine): 14  TV (machine): 400  FiO2: 30  PEEP: 5  ITime: 1  MAP: 9  PC: 15  PIP: 24    Mode: AC/ CMV (Assist Control/ Continuous Mandatory Ventilation), RR (machine): 14, TV (machine): 400, FiO2: 30, PEEP: 5, ITime: 1, MAP: 9, PC: 15, PIP: 24  LABS:                        9.7    12.6  )-----------( 401      ( 06 May 2018 06:57 )             31.7     05-06    139  |  97<L>  |  19.0  ----------------------------<  201<H>  4.7   |  31.0<H>  |  0.55    Ca    10.0      06 May 2018 06:57  Phos  3.9     05-06  Mg     2.5     05-06    TPro  7.8  /  Alb  2.6<L>  /  TBili  0.3<L>  /  DBili  x   /  AST  46<H>  /  ALT  64<H>  /  AlkPhos  186<H>  05-06          CULTURES:  Culture Results:   Numerous Staphylococcus aureus  No Routine respiratory ruddy present (05-04 @ 04:32)      Medications:  cephalexin 500 milliGRAM(s) Oral four times a day  amLODIPine   Tablet 10 milliGRAM(s) Oral daily  hydrALAZINE 25 milliGRAM(s) Oral every 8 hours  hydrALAZINE Injectable 20 milliGRAM(s) IV Push every 6 hours PRN  labetalol 200 milliGRAM(s) Oral every 8 hours  labetalol Injectable 20 milliGRAM(s) IV Push every 2 hours PRN  acetaminophen    Suspension 650 milliGRAM(s) Oral every 6 hours PRN  enoxaparin Injectable 40 milliGRAM(s) SubCutaneous daily  bisacodyl Suppository 10 milliGRAM(s) Rectal daily  lactulose Syrup 20 Gram(s) Oral every 6 hours PRN  pantoprazole   Suspension 40 milliGRAM(s) Oral before lunch  polyethylene glycol 3350 17 Gram(s) Oral daily PRN  senna 2 Tablet(s) Oral at bedtime  folic acid 1 milliGRAM(s) Oral daily  thiamine 100 milliGRAM(s) Oral daily  chlorhexidine 0.12% Liquid 15 milliLiter(s) Swish and Spit two times a day  petrolatum Ophthalmic Ointment 1 Application(s) Both EYES every 6 hours      05-05 @ 07:01  -  05-06 @ 07:00  --------------------------------------------------------  IN: 2340 mL / OUT: 1610 mL / NET: 730 mL    05-06 @ 07:01  -  05-06 @ 23:27  --------------------------------------------------------  IN: 1320 mL / OUT: 355 mL / NET: 965 mL    RADIOLOGY/IMAGING/ECHO    < from: Xray Chest 1 View- PORTABLE-Urgent (05.02.18 @ 16:21) >  Single frontal view of the chest demonstrates OG tube tip is at the GE   junction. Tracheal tube tip is at the thoracic inlet. The lungs are   clear. The cardiomediastinal silhouette is enlarged. No acute osseous   abnormalities. Overlying EKG leads and wiresare noted.    IMPRESSION: NG tube tip at the GE junction. Endotracheal tube tip at the   thoracic inlet. Findings discussed with ICU NOAH Cristina.      Assessment/Plan:    64 y/o F with a h/o EtOH abuse,  Admit 4/23 with acute ICH, pontine bleed acute respiratory failure requiring intubation, AMS, hypertensive emergency.     Pontine bleed.  Poor neuro exam.  Tolerates CPAP, MS precludes extubation.    Family including  have agreed to trah/peg.       MS staph aureus tracheitis on keflex.         Minutes of Critical Care time: 33  (Reviewing data, imaging, discussing with multidisciplinary team, non inclusive of procedures, discussing goals of care with patient/family) Post-Care Instructions: I reviewed with the patient in detail post-care instructions. Patient is to wear sunprotection, and avoid picking at any of the treated lesions. Pt may apply Vaseline to crusted or scabbing areas. Number Of Freeze-Thaw Cycles: 2 freeze-thaw cycles Render Post-Care Instructions In Note?: no Detail Level: Simple Duration Of Freeze Thaw-Cycle (Seconds): 2 Consent: The patient's consent was obtained including but not limited to risks of crusting, scabbing, blistering, scarring, darker or lighter pigmentary change, recurrence, incomplete removal and infection.

## 2021-09-22 NOTE — PROGRESS NOTE ADULT - ATTENDING COMMENTS
DISPLAY PLAN FREE TEXT DISPLAY PLAN FREE TEXT DISPLAY PLAN FREE TEXT Ongoing discussions with  Capo, Palliative following.

## 2023-02-07 NOTE — CHART NOTE - NSCHARTNOTEFT_GEN_A_CORE
repeat head CT this evening with rpelim report:      < from: CT Head No Cont (04.28.18 @ 21:49) >    ******PRELIMINARY REPORT******    ******PRELIMINARY REPORT******           EXAM:  CT BRAIN                          PROCEDURE DATE:  04/28/2018        ******PRELIMINARY REPORT******    ******PRELIMINARY REPORT******          INTERPRETATION:  Prelim:  No significant interval change.    Follow-up official report.          ******PRELIMINARY REPORT******    ******PRELIMINARY REPORT******              NEAL BARNES M.D., ATTENDING RADIOLOGIST      < end of copied text >      plan with neurology was to start lovenox DVT prophylaxis if head CT showed stable ICH  -will order lovenox, to be started tomorrow pending official report is read before Am meds are due Quality 130: Documentation Of Current Medications In The Medical Record: Current Medications Documented Quality 110: Preventive Care And Screening: Influenza Immunization: Influenza Immunization previously received during influenza season Detail Level: Detailed Additional Notes: Medications documented to the best of my ability with the resources available.

## 2023-03-03 NOTE — PROGRESS NOTE ADULT - ASSESSMENT
62 y/o female with history of EtOH abuse with hypertensive emergency and pontine ICH requiring tracheostomy. Was successfully weaned off of cardene drip. Patient no longer requires ICU admission as she is hemodynamically stable with persistent neurologic injury. Continue cephalexin for tracheitis. Include Pregnancy/Lactation Warning?: No

## 2024-05-28 NOTE — ED PROVIDER NOTE - TEMPLATE, MLM
Initiate Treatment: scar away scar gel daily Plan: If no improvement, to consider [facility] scar treatment. Detail Level: Zone Initiate Treatment: Recommended spf 30 General

## 2024-11-20 NOTE — PROGRESS NOTE ADULT - PROBLEM SELECTOR PLAN 8
Per informatics patients therapy plan is listed under Josefina Mares and due to departure same needs to switched under Dr Pike.  New planned entered.   h/o - not active issue at present but has complicated family dynamics somewhat

## 2024-12-12 NOTE — PROGRESS NOTE ADULT - PROBLEM SELECTOR PLAN 7
Tenzin    I agree that you should stay at home for five days.    You can take ibuprofen as needed.    Thank you for choosing our Emergency Department for your care.     You may receive a phone call or letter for a survey about your care in our ED.  Please complete this as this is how we improve care for our patients.     If you have any questions after leaving the ED you can call or text me on my cell phone at [phone].  I am not on call so if I do not answer my phone please leave a message- I will get back to you.  If you are not doing well please return to the ED.     Sincerely,    Dr Suleman Quispe M.D.  Medical Director  [facility] Emergency Department     MSSA - keflex x 7 days  CXR without infiltrates   plan to trach likely to go to OR Thursday per Dr. Gonzalez

## 2025-05-08 NOTE — PATIENT PROFILE ADULT. - PRO MENTAL HEALTH SX RECENT
LMOM to confirm pt's appt etta/Malinda in [place] 5/14/25. Advised pt to arrive 15 minutes prior.  
KIMBER; pt intubated at this time

## 2025-05-15 NOTE — AIRWAY PLACEMENT NOTE ADULT - DISPOSITION AFTER INTUBATION:
Encounter addended by: Benedict Renteria on: 3/25/2024 12:09 PM   Actions taken: Imaging Exam ended, Order list changed
patient placed on mechanical ventilation
No
No

## 2025-07-03 NOTE — CONSULT NOTE ADULT - PROBLEM/RECOMMENDATION-1
Patient arrives from home via EMS for BLE edema. Hx of neuropathy and complains of numbness and tingling.Per EMS she did not want to come to the ED but her good friend convinced her. [place] CP, SOB.   
DISPLAY PLAN FREE TEXT
DISPLAY PLAN FREE TEXT

## 2025-07-14 NOTE — DISCHARGE NOTE ADULT - ADDITIONAL INSTRUCTIONS
PAST MEDICAL HISTORY:  Diabetes type 2      follow up with primary care follow up with primary care  fopllow up with neuro